# Patient Record
Sex: FEMALE | Race: BLACK OR AFRICAN AMERICAN | Employment: OTHER | URBAN - METROPOLITAN AREA
[De-identification: names, ages, dates, MRNs, and addresses within clinical notes are randomized per-mention and may not be internally consistent; named-entity substitution may affect disease eponyms.]

---

## 2017-03-07 ENCOUNTER — OFFICE VISIT (OUTPATIENT)
Dept: CARDIOLOGY CLINIC | Age: 67
End: 2017-03-07

## 2017-03-07 ENCOUNTER — TELEPHONE (OUTPATIENT)
Dept: CARDIOLOGY | Age: 67
End: 2017-03-07

## 2017-03-07 VITALS
DIASTOLIC BLOOD PRESSURE: 59 MMHG | SYSTOLIC BLOOD PRESSURE: 114 MMHG | HEIGHT: 71 IN | BODY MASS INDEX: 24.36 KG/M2 | WEIGHT: 174 LBS | HEART RATE: 61 BPM

## 2017-03-07 DIAGNOSIS — E78.2 MIXED HYPERLIPIDEMIA: ICD-10-CM

## 2017-03-07 DIAGNOSIS — Z01.810 PREOP CARDIOVASCULAR EXAM: ICD-10-CM

## 2017-03-07 DIAGNOSIS — Z95.810 ICD (IMPLANTABLE CARDIOVERTER-DEFIBRILLATOR) IN PLACE: ICD-10-CM

## 2017-03-07 DIAGNOSIS — I25.10 ATHEROSCLEROSIS OF NATIVE CORONARY ARTERY OF NATIVE HEART WITHOUT ANGINA PECTORIS: Primary | ICD-10-CM

## 2017-03-07 DIAGNOSIS — I10 ESSENTIAL HYPERTENSION, BENIGN: ICD-10-CM

## 2017-03-07 DIAGNOSIS — Z95.1 POSTSURGICAL AORTOCORONARY BYPASS STATUS: ICD-10-CM

## 2017-03-07 NOTE — PROGRESS NOTES
1. Have you been to the ER, urgent care clinic since your last visit? Hospitalized since your last visit? No    2. Have you seen or consulted any other health care providers outside of the [address] [place] [address] since your last visit? Include any pap smears or colon screening. No     3. Since your last visit, have you had any of the following symptoms? .           4. Have you had any blood work, X-rays or cardiac testing? No              5.  Where do you normally have your labs drawn? [facility]    6. Do you need any refills today?    No

## 2017-03-07 NOTE — LETTER
Tatiana Green 1950 
 
3/7/2017 Dear Cristo Dunn, DO I had the pleasure of evaluating  Ms. Varinder Lindsey in office today. Below are the relevant portions of my assessment and plan of care. ICD-10-CM ICD-9-CM 1. Atherosclerosis of native coronary artery of native heart without angina pectoris I25.10 414.01   
 3/17; 8/16 stable Symptoms; CABG 10/02 with LIMA to LAD and SVG to RCA 2. Postsurgical aortocoronary bypass status Z95.1 V45.81   
3. Essential hypertension, benign I10 401.1   
 controlled 4. Mixed hyperlipidemia E78.2 272.2 HEPATIC FUNCTION PANEL  
   LIPID PANEL  
 2/16 LDL81;   
5. ICD (implantable cardioverter-defibrillator) in place-DDD ICD Z95.810 V45.02   
 2/17 nl function; DONNY 
d/w pt & Dr Brenda Paniagua- will schedule gen change electively 6. Preop cardiovascular exam Z01.810 V72.81 CBC W/O DIFF  
   METABOLIC PANEL, BASIC  
   PROTHROMBIN TIME + INR  
   XR CHEST PA LAT  
   URINALYSIS W/ RFLX MICROSCOPIC  
 prior to gen change Current Outpatient Prescriptions Medication Sig Dispense Refill  acetaminophen (TYLENOL) 500 mg tablet Take  by mouth every six (6) hours as needed for Pain.  ezetimibe (ZETIA) 10 mg tablet Take  by mouth.  rosuvastatin (CRESTOR) 40 mg tablet Take 1 Tab by mouth nightly. 30 Tab 4  
 metoprolol (LOPRESSOR) 100 mg tablet Take 100 mg by mouth daily.  amLODIPine (NORVASC) 10 mg tablet Take 10 mg by mouth daily.  aspirin (ASPIRIN) 325 mg tablet Take 325 mg by mouth daily. Orders Placed This Encounter  XR CHEST PA LAT Standing Status:   Future Standing Expiration Date:   9/7/2017 Order Specific Question:   Reason for Exam  
  Answer:   cath, poss PCI Order Specific Question:   Is Patient Allergic to Contrast Dye? Answer:   No  
 CBC W/O DIFF Standing Status:   Future Standing Expiration Date:   3/8/2018  METABOLIC PANEL, BASIC Standing Status:   Future Standing Expiration Date:   3/8/2018  PROTHROMBIN TIME + INR Standing Status:   Future Standing Expiration Date:   4/18/2017  URINALYSIS W/ RFLX MICROSCOPIC Standing Status:   Future Standing Expiration Date:   4/18/2017  
 HEPATIC FUNCTION PANEL Standing Status:   Future Standing Expiration Date:   6/7/2017  LIPID PANEL Standing Status:   Future Standing Expiration Date:   6/7/2017 If you have questions, please do not hesitate to call me. I look forward to following Ms. Leann Mccauley along with you. Sincerely, Elijah Harrison MD

## 2017-03-07 NOTE — MR AVS SNAPSHOT
Visit Information Date & Time Provider Department Dept. Phone Encounter #  
 3/7/2017  1:15 PM Liliya Alas MD [facility] [id number] Follow-up Instructions Return in about 6 weeks (around 4/18/2017), or if symptoms worsen or fail to improve, for post surgery, with pacer/ICD check. Your Appointments 4/6/2017  3:20 PM  
NEW ELECTROPHYSIOLOGY with Leoncio Arevalo MD  
[facility] ([facility]) Appt Note: New EP refer by Tammy Freitas, for END OF HKSR/[phone]/ Ladene Flatten [place] [address] [address] [address] [phone] [address]  
  
    
 4/20/2017  1:45 PM  
ESTABLISHED PATIENT with Liliya Alas MD  
[facility] ([facility]) Appt Note: 6 wks [address] [facility] [address]  
  
   
 [address] [facility]  
  
    
 5/24/2017  8:45 AM  
[facility] ([facility]) Appt Note: [facility] Transmission/[place]/Wireless [address] [facility] [address]  
  
   
 [address] [facility]  
  
    
 8/30/2017  8:30 AM  
[facility] ([facility]) Appt Note: [employer] Transmission/[place]/Wireless [address] [facility] [address]  
[phone]  
  
    
 12/15/2017 11:15 AM  
PROCEDURE with Liliya Alas MD  
[facility] ([facility]) Appt Note: 1 year in office pacer check/[employer] [address] [facility] [address]  
  
   
 [address] [address] [address] [address] Upcoming Health Maintenance Date Due Hepatitis C Screening 1950 DTaP/Tdap/Td series (1 - Tdap) 12/3/1969 BREAST CANCER SCRN MAMMOGRAM 12/3/1998 FOBT Q 1 YEAR AGE 50-75 12/3/1998 ZOSTER VACCINE AGE 60> 12/3/2008 GLAUCOMA SCREENING Q2Y 12/3/2013 OSTEOPOROSIS SCREENING (DEXA) 12/3/2013 Pneumococcal 65+ Low/Medium Risk (1 of 2 - PCV13) 12/3/2013 MEDICARE YEARLY EXAM 12/3/2013 INFLUENZA AGE 9 TO ADULT 8/1/2016 Allergies as of 3/7/2017  Review Complete On: 3/7/2017 By: Lucien Vee MD  
  
 Severity Noted Reaction Type Reactions Losartan High 10/22/2013   Intolerance Angioedema Tongue swelling but not on lips Current Immunizations  Never Reviewed No immunizations on file. Not reviewed this visit You Were Diagnosed With   
  
 Codes Comments Atherosclerosis of native coronary artery of native heart without angina pectoris    -  Primary ICD-10-CM: I25.10 ICD-9-CM: 414.01 3/17; 8/16 stable Symptoms; CABG 10/02 with LIMA to LAD and SVG to RCA Postsurgical aortocoronary bypass status     ICD-10-CM: Z95.1 ICD-9-CM: V45.81 Essential hypertension, benign     ICD-10-CM: I10 
ICD-9-CM: 401.1 controlled Mixed hyperlipidemia     ICD-10-CM: E78.2 ICD-9-CM: 272.2 2/16 LDL81;   
 ICD (implantable cardioverter-defibrillator) in place     ICD-10-CM: Z95.810 ICD-9-CM: V45.02 2/17 nl function; DONNY 
d/w pt & Dr Deidra Chaves- will schedule gen change electively Preop cardiovascular exam     ICD-10-CM: Z01.810 ICD-9-CM: V72.81 prior to gen change Vitals BP Pulse Height(growth percentile) Weight(growth percentile) BMI OB Status 114/59 61 5' 11\" (1.803 m) 174 lb (78.9 kg) 24.27 kg/m2 Menopause Smoking Status Former Smoker Vitals History BMI and BSA Data Body Mass Index Body Surface Area  
 24.27 kg/m 2 1.99 m 2 Preferred Pharmacy Pharmacy Name Phone [address] [facility], [address] [address] Your Updated Medication List  
  
   
This list is accurate as of: 3/7/17  2:11 PM.  Always use your most recent med list.  
 acetaminophen 500 mg tablet Commonly known as:  TYLENOL Take  by mouth every six (6) hours as needed for Pain. aspirin 325 mg tablet Commonly known as:  ASPIRIN Take 325 mg by mouth daily. ezetimibe 10 mg tablet Commonly known as:  Mallie Phuong Take  by mouth.  
  
 metoprolol tartrate 100 mg IR tablet Commonly known as:  LOPRESSOR Take 100 mg by mouth daily. NORVASC 10 mg tablet Generic drug:  amLODIPine Take 10 mg by mouth daily. rosuvastatin 40 mg tablet Commonly known as:  CRESTOR Take 1 Tab by mouth nightly. Follow-up Instructions Return in about 6 weeks (around 4/18/2017), or if symptoms worsen or fail to improve, for post surgery, with pacer/ICD check. To-Do List   
 Around 03/07/2017 Lab:  CBC W/O DIFF   
  
 03/07/2017 Lab:  METABOLIC PANEL, BASIC Around 03/07/2017 Lab:  PROTHROMBIN TIME + INR Around 03/07/2017 Lab:  URINALYSIS W/ RFLX MICROSCOPIC   
  
 03/07/2017 Imaging:  XR CHEST PA LAT Around 03/14/2017 Lab:  HEPATIC FUNCTION PANEL Around 03/14/2017 Lab:  LIPID PANEL Patient Instructions Medications Discontinued During This Encounter Medication Reason  meloxicam (MOBIC) 15 mg tablet Not A Current Medication  chlorthalidone (HYGROTEN) 25 mg tablet Discontinued by Another Clinician Orders Placed This Encounter  XR CHEST PA LAT Standing Status:   Future Standing Expiration Date:   9/7/2017 Order Specific Question:   Reason for Exam  
  Answer:   cath, poss PCI Order Specific Question:   Is Patient Allergic to Contrast Dye? Answer:   No  
 CBC W/O DIFF Standing Status:   Future Standing Expiration Date:   3/8/2018  METABOLIC PANEL, BASIC Standing Status:   Future Standing Expiration Date:   3/8/2018  PROTHROMBIN TIME + INR Standing Status:   Future Standing Expiration Date:   4/18/2017  URINALYSIS W/ RFLX MICROSCOPIC Standing Status:   Future Standing Expiration Date:   4/18/2017  
 HEPATIC FUNCTION PANEL Standing Status:   Future Standing Expiration Date:   6/7/2017  LIPID PANEL Standing Status:   Future Standing Expiration Date:   6/7/2017 High Cholesterol: Care Instructions Your Care Instructions Cholesterol is a type of fat in your blood. It is needed for many body functions, such as making new cells. Cholesterol is made by your body. It also comes from food you eat. High cholesterol means that you have too much of the fat in your blood. This raises your risk of a heart attack and stroke. LDL and HDL are part of your total cholesterol. LDL is the \"bad\" cholesterol. High LDL can raise your risk for heart disease, heart attack, and stroke. HDL is the \"good\" cholesterol. It helps clear bad cholesterol from the body. High HDL is linked with a lower risk of heart disease, heart attack, and stroke. Your cholesterol levels help your doctor find out your risk for having a heart attack or stroke. You and your doctor can talk about whether you need to lower your risk and what treatment is best for you. A heart-healthy lifestyle along with medicines can help lower your cholesterol and your risk. The way you choose to lower your risk will depend on how high your risk is for heart attack and stroke. It will also depend on how you feel about taking medicines. Follow-up care is a key part of your treatment and safety. Be sure to make and go to all appointments, and call your doctor if you are having problems. It's also a good idea to know your test results and keep a list of the medicines you take. How can you care for yourself at home? · Eat a variety of foods every day. Good choices include fruits, vegetables, whole grains (like oatmeal), dried beans and peas, nuts and seeds, soy products (like tofu), and fat-free or low-fat dairy products. · Replace butter, margarine, and hydrogenated or partially hydrogenated oils with olive and canola oils. (Canola oil margarine without trans fat is fine.) · Replace red meat with fish, poultry, and soy protein (like tofu). · Limit processed and packaged foods like chips, crackers, and cookies. · Bake, broil, or steam foods. Don't amanda them. · Be physically active. Get at least 30 minutes of exercise on most days of the week. Walking is a good choice. You also may want to do other activities, such as running, swimming, cycling, or playing tennis or team sports. · Stay at a healthy weight or lose weight by making the changes in eating and physical activity listed above. Losing just a small amount of weight, even 5 to 10 pounds, can reduce your risk for having a heart attack or stroke. · Do not smoke. When should you call for help? Watch closely for changes in your health, and be sure to contact your doctor if: 
· You need help making lifestyle changes. · You have questions about your medicine. Where can you learn more? Go to http://ericBeijing 1000CHI Software Technologyrandee.info/. Enter G352 in the search box to learn more about \"High Cholesterol: Care Instructions. \" Current as of: January 27, 2016 Content Version: 11.1 © 5874-5709 [employer], [employer]. Care instructions adapted under license by [employer] (which disclaims liability or warranty for this information). If you have questions about a medical condition or this instruction, always ask your healthcare professional. [place] [address] any warranty or liability for your use of this information. Introducing [facility]! Graham Taylor introduces [employer] patient portal. Now you can access parts of your medical record, email your doctor's office, and request medication refills online. 1. In your internet browser, go to [email or web address] 2. Click on the First Time User? Click Here link in the Sign In box. You will see the New Member Sign Up page. 3. Enter your EeBria Access Code exactly as it appears below. You will not need to use this code after youve completed the sign-up process. If you do not sign up before the expiration date, you must request a new code. · EeBria Access Code: [email or web address] Expires: 6/5/2017  1:23 PM 
 
4. Enter the last four digits of your Social Security Number (xxxx) and Date of Birth (mm/dd/yyyy) as indicated and click Submit. You will be taken to the next sign-up page. 5. Create a EeBria ID. This will be your EeBria login ID and cannot be changed, so think of one that is secure and easy to remember. 6. Create a EeBria password. You can change your password at any time. 7. Enter your Password Reset Question and Answer. This can be used at a later time if you forget your password. 8. Enter your e-mail address. You will receive e-mail notification when new information is available in [address] 9. Click Sign Up. You can now view and download portions of your medical record. 10. Click the Download Summary menu link to download a portable copy of your medical information. If you have questions, please visit the Frequently Asked Questions section of the EeBria website. Remember, EeBria is NOT to be used for urgent needs. For medical emergencies, dial 911. Now available from your iPhone and Android! Please provide this summary of care documentation to your next provider. Your primary care clinician is listed as Deana Dunn. If you have any questions after today's visit, please call [phone].

## 2017-03-07 NOTE — PATIENT INSTRUCTIONS
Medications Discontinued During This Encounter   Medication Reason    meloxicam (MOBIC) 15 mg tablet Not A Current Medication    chlorthalidone (HYGROTEN) 25 mg tablet Discontinued by Another Clinician       Orders Placed This Encounter    XR CHEST PA LAT     Standing Status:   Future     Standing Expiration Date:   9/7/2017     Order Specific Question:   Reason for Exam     Answer:   cath, poss PCI     Order Specific Question:   Is Patient Allergic to Contrast Dye? Answer:   No    CBC W/O DIFF     Standing Status:   Future     Standing Expiration Date:   4/2/2282    METABOLIC PANEL, BASIC     Standing Status:   Future     Standing Expiration Date:   3/8/2018    PROTHROMBIN TIME + INR     Standing Status:   Future     Standing Expiration Date:   4/18/2017    URINALYSIS W/ RFLX MICROSCOPIC     Standing Status:   Future     Standing Expiration Date:   4/18/2017    HEPATIC FUNCTION PANEL     Standing Status:   Future     Standing Expiration Date:   6/7/2017    LIPID PANEL     Standing Status:   Future     Standing Expiration Date:   6/7/2017          High Cholesterol: Care Instructions  Your Care Instructions  Cholesterol is a type of fat in your blood. It is needed for many body functions, such as making new cells. Cholesterol is made by your body. It also comes from food you eat. High cholesterol means that you have too much of the fat in your blood. This raises your risk of a heart attack and stroke. LDL and HDL are part of your total cholesterol. LDL is the \"bad\" cholesterol. High LDL can raise your risk for heart disease, heart attack, and stroke. HDL is the \"good\" cholesterol. It helps clear bad cholesterol from the body. High HDL is linked with a lower risk of heart disease, heart attack, and stroke. Your cholesterol levels help your doctor find out your risk for having a heart attack or stroke.  You and your doctor can talk about whether you need to lower your risk and what treatment is best for you.  A heart-healthy lifestyle along with medicines can help lower your cholesterol and your risk. The way you choose to lower your risk will depend on how high your risk is for heart attack and stroke. It will also depend on how you feel about taking medicines. Follow-up care is a key part of your treatment and safety. Be sure to make and go to all appointments, and call your doctor if you are having problems. It's also a good idea to know your test results and keep a list of the medicines you take. How can you care for yourself at home? · Eat a variety of foods every day. Good choices include fruits, vegetables, whole grains (like oatmeal), dried beans and peas, nuts and seeds, soy products (like tofu), and fat-free or low-fat dairy products. · Replace butter, margarine, and hydrogenated or partially hydrogenated oils with olive and canola oils. (Canola oil margarine without trans fat is fine.)  · Replace red meat with fish, poultry, and soy protein (like tofu). · Limit processed and packaged foods like chips, crackers, and cookies. · Bake, broil, or steam foods. Don't amanda them. · Be physically active. Get at least 30 minutes of exercise on most days of the week. Walking is a good choice. You also may want to do other activities, such as running, swimming, cycling, or playing tennis or team sports. · Stay at a healthy weight or lose weight by making the changes in eating and physical activity listed above. Losing just a small amount of weight, even 5 to 10 pounds, can reduce your risk for having a heart attack or stroke. · Do not smoke. When should you call for help? Watch closely for changes in your health, and be sure to contact your doctor if:  · You need help making lifestyle changes. · You have questions about your medicine. Where can you learn more? Go to http://eric-randee.info/.   Enter U042 in the search box to learn more about \"High Cholesterol: Care Instructions. \"  Current as of: January 27, 2016  Content Version: 11.1  © 4354-1608 [employer], [facility]. Care instructions adapted under license by [employer] (which disclaims liability or warranty for this information). If you have questions about a medical condition or this instruction, always ask your healthcare professional. [place] [address] any warranty or liability for your use of this information.

## 2017-03-07 NOTE — PROGRESS NOTES
HISTORY OF PRESENT ILLNESS  Rebecca Fallon is a 74 y.o. female. HPI Comments: Patient denies significant chest pain, SOB, palpitations, edema, dizziness      Hypertension   The history is provided by the medical records. This is a chronic problem. Pertinent negatives include no chest pain, no headaches and no shortness of breath. Cholesterol Problem   The history is provided by the medical records. This is a chronic problem. Pertinent negatives include no chest pain, no headaches and no shortness of breath. Review of Systems   Constitutional: Negative for chills, fever, malaise/fatigue and weight loss. HENT: Negative for nosebleeds. Eyes: Negative for discharge. Respiratory: Negative for cough, shortness of breath and wheezing. Cardiovascular: Negative for chest pain, palpitations, orthopnea, claudication, leg swelling and PND. Gastrointestinal: Negative for diarrhea, nausea and vomiting. Genitourinary: Negative for dysuria and hematuria. Musculoskeletal: Negative for joint pain. Skin: Negative for rash. Neurological: Negative for dizziness, seizures, loss of consciousness and headaches. Endo/Heme/Allergies: Negative for polydipsia. Does not bruise/bleed easily. Psychiatric/Behavioral: Negative for depression and substance abuse. The patient does not have insomnia.       Allergies   Allergen Reactions    Losartan Angioedema     Tongue swelling but not on lips       Past Medical History:   Diagnosis Date    Arthritis     Automatic implantable cardiac defibrillator in situ     Coronary atherosclerosis of native coronary artery     CABG 10/02 with LIMA to LAD and SVG to RCA    Depression     Essential hypertension     Headache(784.0)     Other and unspecified hyperlipidemia     9/12 Low density lipoproteins are at goal, triglycerides are at goal, high density lipoproteins are at goal.    Postsurgical aortocoronary bypass status 2002    Presence of permanent cardiac pacemaker-DDD 11/6/2015    OFFICE CHECK : 8/14 nl function, +AHR rare(changed detection to over 150/mt); 11/15 nl function     Unspecified arthropathy, lower leg     S/P Left knee replacement. Had repeat surgery's for her left knee.  UTI (urinary tract infection)        Family History   Problem Relation Age of Onset    Arthritis-osteo Other      not specified    Hypertension Other      not specified    Sudden Death Neg Hx     Heart Attack Neg Hx        Social History   Substance Use Topics    Smoking status: Former Smoker     Quit date: 4/25/2002    Smokeless tobacco: Not on file    Alcohol use No        Current Outpatient Prescriptions   Medication Sig    chlorthalidone (HYGROTEN) 25 mg tablet Take 0.5 Tabs by mouth daily as needed (edema).  acetaminophen (TYLENOL) 500 mg tablet Take  by mouth every six (6) hours as needed for Pain.  meloxicam (MOBIC) 15 mg tablet Take 1 Tab by mouth daily. Take with meals    ezetimibe (ZETIA) 10 mg tablet Take  by mouth.  rosuvastatin (CRESTOR) 40 mg tablet Take 1 Tab by mouth nightly.  metoprolol (LOPRESSOR) 100 mg tablet Take 100 mg by mouth daily.  amLODIPine (NORVASC) 10 mg tablet Take 10 mg by mouth daily.  aspirin (ASPIRIN) 325 mg tablet Take 325 mg by mouth daily. No current facility-administered medications for this visit. Past Surgical History:   Procedure Laterality Date    CARDIAC SURG PROCEDURE UNLIST  11/09    gen change-obici    HX CORONARY ARTERY BYPASS GRAFT  10/2002    HX ORTHOPAEDIC  [id number]    Reduced two component revision using the Sigma System. Lateral release. Suture anchor repair of medial collateral ligament. Ruth Pouch  [id number]    Left knee arthrofibrosis, status post significant scarring, status post left total knee replacement    VASCULAR SURGERY PROCEDURE UNLIST  2005    Aorto biffem.   Dr. Jack Arguello       Diagnostic Studies:  CARDIOLOGY STUDIES 2/13/2013 1/1/2013 1/1/2009 2/1/2008 10/1/2002 Myocardial Perfusion Scan Result - ant ischemia, nl EF - abn scan, fixed anterolateral wall defect with partially reversible defect, EF 70% -   Cardiac Cath Result patent LIMA, sev native CAD including LM, med Rx for now - - - -   Echocardiogram - Complete Result - - EF 65%, PAP 33 - -   Coronary Angiogram Result - - - - Bypass x's 2       There were no vitals taken for this visit. Ms. Varinder Lindsey has a reminder for a \"due or due soon\" health maintenance. I have asked that she contact her primary care provider for follow-up on this health maintenance. Physical Exam   Constitutional: She is oriented to person, place, and time. She appears well-developed and well-nourished. No distress. HENT:   Head: Normocephalic and atraumatic. Eyes: Right eye exhibits no discharge. Left eye exhibits no discharge. No scleral icterus. Neck: Neck supple. No JVD present. Carotid bruit is not present. No thyromegaly present. Cardiovascular: Normal rate, regular rhythm, S1 normal, S2 normal, normal heart sounds and intact distal pulses. Exam reveals no gallop and no friction rub. No murmur heard. Pulmonary/Chest: Effort normal and breath sounds normal. She has no wheezes. She has no rales. Abdominal: Soft. She exhibits no mass. There is no tenderness. Musculoskeletal: She exhibits no edema. Cant put legs straight   Neurological: She is alert and oriented to person, place, and time. Skin: Skin is warm and dry. No rash noted. Psychiatric: She has a normal mood and affect. Her behavior is normal.        ASSESSMENT and PLAN          Jarek Maria was seen today for coronary artery disease, cholesterol problem and hypertension.     Diagnoses and all orders for this visit:    Atherosclerosis of native coronary artery of native heart without angina pectoris  Comments:  3/17; 8/16 stable Symptoms; CABG 10/02 with LIMA to LAD and SVG to RCA      Postsurgical aortocoronary bypass status    Essential hypertension, benign  Comments:  controlled    Mixed hyperlipidemia  Comments:  2/16 LDL81;   Orders:  -     HEPATIC FUNCTION PANEL; Future  -     LIPID PANEL; Future    ICD (implantable cardioverter-defibrillator) in place-DDD ICD  Comments:  2/17 nl function; DONNY  d/w pt & Dr Hoda Green- will schedule gen change electively    Preop cardiovascular exam  Comments:  prior to gen change  Orders:  -     CBC W/O DIFF; Future  -     METABOLIC PANEL, BASIC; Future  -     PROTHROMBIN TIME + INR; Future  -     XR CHEST PA LAT; Future  -     URINALYSIS W/ RFLX MICROSCOPIC; Future        Pertinent laboratory and test data reviewed and discussed with patient. See patient instructions also for other medical advice given    Medications Discontinued During This Encounter   Medication Reason    meloxicam (MOBIC) 15 mg tablet Not A Current Medication    chlorthalidone (HYGROTEN) 25 mg tablet Discontinued by Another Clinician       Follow-up Disposition:  Return in about 6 weeks (around 4/18/2017), or if symptoms worsen or fail to improve, for post surgery, with pacer/ICD check.

## 2017-03-08 ENCOUNTER — TELEPHONE (OUTPATIENT)
Dept: CARDIOLOGY CLINIC | Age: 67
End: 2017-03-08

## 2017-03-08 RX ORDER — SODIUM CHLORIDE 9 MG/ML
25 INJECTION, SOLUTION INTRAVENOUS CONTINUOUS
Status: CANCELLED | OUTPATIENT
Start: 2017-03-08

## 2017-03-08 NOTE — TELEPHONE ENCOUNTER
Patient is scheduled for Dual AICD Gen Change on 3/15/17 @ 8:00am with Dr. Emigdio Restrepo. Lacy Lot or Vannesa Hole could you please call her with her instructions. She is having previously ordered blood work done at [facility] on 3/9/17 . Claudia Mayes Told her you could let her know if what she is doing is what we need. Rocky ANDRADE Dermanis  Christine Reza Dermanis        Cc: Macy Henderson       Caller: Unspecified (Yesterday,  2:00 PM)                     LM on home # to call office to get scheduled for Medtronic Dual AICD Gen Change with Dr. Emigdio Restrepo. Unable to LM on cell #.            Previous Messages       ----- Message -----   Randi Gerber From: Whitney Conde RN      Sent: 3/7/2017   2:16 PM        To: Renetta Vásquez         ----- Message -----      From: Gil Jefferson MD      Sent: 3/7/2017   2:01 PM        To: Whitney Conde RN, Macy Henderson     ----- Message from Gil Jefferson MD sent at 3/7/2017  2:01 PM EST -----   Please schedule Dr. Annalisa Leblanc patient for medtronic aicd generator changeout next week.  thx                         Patient Calls            You   You; Macy Henderson 19 hours ago (2:54 PM)                 LM on home # to call office to get scheduled for Medtronic Dual AICD Gen Change with Dr. Emigdio Restrepo. Unable to LM on cell #. (Routing comment)                        Rachel Lang, LION routed conversation to Elton Cheung 20 hours ago (2:16 PM)                       MD Macy Vitale; Whitney Conde RN 20 hours ago (2:01 PM)                   Please schedule Dr. Annalisa Leblanc patient for medtronic aicd generator changeout next week.  thx   (Routing comment)                        Dr. Leopold Capuchin, MD 20 hours ago (2:00 PM)                       Gil Jefferson MD 20 hours ago (2:00 PM)                 Asked by Dr. Dani Arevalo to arranged AICD generator changeout, triggered last month.      I will make arrangements for changeout next week. Not on [address].  I will have office call patient for date.     Thank you for this referral.                Documentation

## 2017-03-08 NOTE — TELEPHONE ENCOUNTER
Pt aware of instructions and read back the instructions. Pt is having ,lab work at [facility] , no need for cxr for gen change.

## 2017-03-08 NOTE — TELEPHONE ENCOUNTER
[facility]          Patient  EP Instructions                  1. You are scheduled to have a Gen Change on  March 15, 2017 , at 0800 am.    Please check in at 0700 am.    2. Please go to [facility] and park in the outpatient parking lot that is located around to the back of the hospital and enter through the [employer]. Once you enter through the [facility] check in with the  there. The  will either give you directions or assist you in getting to the cath holding area. 3.  [x]       You are not to eat or drink anything after midnight the morning of the               procedure. 4. Please continue to take your medications with a small sip of water on the morning of the procedure with the following exceptions:      5. If you are diabetic, do not take your insulin/sugar pill the morning of the procedure. 6. We encourage families to wait in the waiting room on the first floor while the procedure is being done. The Doctor will come out and talk with you as soon as the procedure is over. 7. There is the possibility that you may spend the night in the hospital, depending on the results of the procedure. This will be determined after the procedure is done. 8.   If you or your family have any questions, please call our office Monday-Friday 9:00am         -4:30 pm , at [phone], and ask to speak to one of the nurses.

## 2017-03-08 NOTE — H&P
Please see full H&P by Dr. Gannon Cons below. Plan Medtronic aicd generator changeout. Procedure discussed with patient and family at bedside. All risks, benefits, alternatives discussed. Plan moderate sedation if anesthesia not available. Risks included but not limited to pain, infection, bleeding, deep venous thrombosis with chronic swelling of arm, anesthesia reactions, pneumothorax, hemothorax, emergent open heart surgery, and death. All questions answered. Diagnosis:  Primary cardiologist Dr. Gannon Cons  -Medtronic dual chamber AICD at [facility], triggered February 2017. Placed by Dr. Murleen Cogan in 2009  -Ischemic cardiomyopathy, improved with EF normal by 2013  -Chronic class II systolic heart failure, compensated  -h/o CAD with CABG in 2002. Cath 2013 with patent LIMA to LAD with severe diffuse disease, medically treated  -h/o transient ischemic cardiomyopathy, improving to normal by echo 2013  -DJD s/p TKA  -HTN  -PAD with remote aortobifem surgery  -Chronic BB therapy  -QRS > 120 msec, unlikely to benefit from biventricular pacing  -Losartan life threatening allergy  -Thrombocytopenia, mild  -Life expectancy > 1 year      HISTORY OF PRESENT ILLNESS  Sophia Brunson is a 74 y.o. female.     HPI Comments: Patient denies significant chest pain, SOB, palpitations, edema, dizziness     Hypertension   The history is provided by the medical records. This is a chronic problem. Pertinent negatives include no chest pain, no headaches and no shortness of breath. Cholesterol Problem   The history is provided by the medical records. This is a chronic problem. Pertinent negatives include no chest pain, no headaches and no shortness of breath.         Review of Systems   Constitutional: Negative for chills, fever, malaise/fatigue and weight loss. HENT: Negative for nosebleeds. Eyes: Negative for discharge. Respiratory: Negative for cough, shortness of breath and wheezing.    Cardiovascular: Negative for chest pain, palpitations, orthopnea, claudication, leg swelling and PND. Gastrointestinal: Negative for diarrhea, nausea and vomiting. Genitourinary: Negative for dysuria and hematuria. Musculoskeletal: Negative for joint pain. Skin: Negative for rash. Neurological: Negative for dizziness, seizures, loss of consciousness and headaches. Endo/Heme/Allergies: Negative for polydipsia. Does not bruise/bleed easily. Psychiatric/Behavioral: Negative for depression and substance abuse. The patient does not have insomnia.            Allergies   Allergen Reactions    Losartan Angioedema       Tongue swelling but not on lips              Past Medical History:   Diagnosis Date    Arthritis      Automatic implantable cardiac defibrillator in situ      Coronary atherosclerosis of native coronary artery       CABG 10/02 with LIMA to LAD and SVG to RCA    Depression      Essential hypertension      Headache(784.0)      Other and unspecified hyperlipidemia       9/12 Low density lipoproteins are at goal, triglycerides are at goal, high density lipoproteins are at goal.    Postsurgical aortocoronary bypass status 2002    Presence of permanent cardiac pacemaker-DDD 11/6/2015     OFFICE CHECK : 8/14 nl function, +AHR rare(changed detection to over 150/mt); 11/15 nl function     Unspecified arthropathy, lower leg       S/P Left knee replacement. Had repeat surgery's for her left knee.     UTI (urinary tract infection)                  Family History   Problem Relation Age of Onset    Arthritis-osteo Other         not specified    Hypertension Other         not specified    Sudden Death Neg Hx      Heart Attack Neg Hx                 Social History   Substance Use Topics    Smoking status: Former Smoker       Quit date: 4/25/2002    Smokeless tobacco: Not on file    Alcohol use No              Current Outpatient Prescriptions   Medication Sig    chlorthalidone (HYGROTEN) 25 mg tablet Take 0.5 Tabs by mouth daily as needed (edema).  acetaminophen (TYLENOL) 500 mg tablet Take by mouth every six (6) hours as needed for Pain.  meloxicam (MOBIC) 15 mg tablet Take 1 Tab by mouth daily. Take with meals    ezetimibe (ZETIA) 10 mg tablet Take by mouth.  rosuvastatin (CRESTOR) 40 mg tablet Take 1 Tab by mouth nightly.  metoprolol (LOPRESSOR) 100 mg tablet Take 100 mg by mouth daily.  amLODIPine (NORVASC) 10 mg tablet Take 10 mg by mouth daily.  aspirin (ASPIRIN) 325 mg tablet Take 325 mg by mouth daily.       No current facility-administered medications for this visit.                Past Surgical History:   Procedure Laterality Date    CARDIAC SURG PROCEDURE UNLIST   11/09     gen change-obici    HX CORONARY ARTERY BYPASS GRAFT   10/2002   [facility] ORTHOPAEDIC   [id number]     Reduced two component revision using the Sigma System. Lateral release. Suture anchor repair of medial collateral ligament. [facility]   [id number]     Left knee arthrofibrosis, status post significant scarring, status post left total knee replacement    VASCULAR SURGERY PROCEDURE UNLIST   2005     Aorto biffem. Dr. Steward Brunner         Diagnostic Studies:  CARDIOLOGY STUDIES 2/13/2013 1/1/2013 1/1/2009 2/1/2008 10/1/2002   Myocardial Perfusion Scan Result - ant ischemia, nl EF - abn scan, fixed anterolateral wall defect with partially reversible defect, EF 70% -   Cardiac Cath Result patent LIMA, sev native CAD including LM, med Rx for now - - - -   Echocardiogram - Complete Result - - EF 65%, PAP 33 - -   Coronary Angiogram Result - - - - Bypass x's 2         There were no vitals taken for this visit.     Ms. Nash Ireland has a reminder for a \"due or due soon\" health maintenance. I have asked that she contact her primary care provider for follow-up on this health maintenance.        Physical Exam   Constitutional: She is oriented to person, place, and time. She appears well-developed and well-nourished. No distress.    HENT:   Head: Normocephalic and atraumatic. Eyes: Right eye exhibits no discharge. Left eye exhibits no discharge. No scleral icterus. Neck: Neck supple. No JVD present. Carotid bruit is not present. No thyromegaly present. Cardiovascular: Normal rate, regular rhythm, S1 normal, S2 normal, normal heart sounds and intact distal pulses. Exam reveals no gallop and no friction rub. No murmur heard. Pulmonary/Chest: Effort normal and breath sounds normal. She has no wheezes. She has no rales. Abdominal: Soft. She exhibits no mass. There is no tenderness. Musculoskeletal: She exhibits no edema. Cant put legs straight   Neurological: She is alert and oriented to person, place, and time. Skin: Skin is warm and dry. No rash noted. Psychiatric: She has a normal mood and affect. Her behavior is normal.         ASSESSMENT and PLAN              Pedrito Check was seen today for coronary artery disease, cholesterol problem and hypertension.     Diagnoses and all orders for this visit:     Atherosclerosis of native coronary artery of native heart without angina pectoris  Comments:  3/17; 8/16 stable Symptoms; CABG 10/02 with LIMA to LAD and SVG to RCA        Postsurgical aortocoronary bypass status     Essential hypertension, benign  Comments:  controlled     Mixed hyperlipidemia  Comments:  2/16 LDL81;   Orders:  - HEPATIC FUNCTION PANEL; Future  - LIPID PANEL; Future     ICD (implantable cardioverter-defibrillator) in place-DDD ICD  Comments:  2/17 nl function; DONNY  d/w pt & Dr Tory Quintana- will schedule gen change electively     Preop cardiovascular exam  Comments:  prior to gen change  Orders:  - CBC W/O DIFF; Future  - METABOLIC PANEL, BASIC; Future  - PROTHROMBIN TIME + INR; Future  - XR CHEST PA LAT; Future  - URINALYSIS W/ RFLX MICROSCOPIC; Future           Pertinent laboratory and test data reviewed and discussed with patient.   See patient instructions also for other medical advice given          Medications Discontinued During This Encounter Medication Reason    meloxicam (MOBIC) 15 mg tablet Not A Current Medication    chlorthalidone (HYGROTEN) 25 mg tablet Discontinued by Another Clinician         Follow-up Disposition:  Return in about 6 weeks (around 4/18/2017), or if symptoms worsen or fail to improve, for post surgery, with pacer/ICD check.         Electronically signed by Baron Jacinto MD at 03/07/17 [id number]

## 2017-03-09 LAB
A-G RATIO,AGRAT: 1.2 RATIO (ref 1.1–2.6)
ALBUMIN SERPL-MCNC: 4 G/DL (ref 3.5–5)
ALP SERPL-CCNC: 81 U/L (ref 40–120)
ALT SERPL-CCNC: 11 U/L (ref 5–40)
ANION GAP SERPL CALC-SCNC: 12.9 MMOL/L
AST SERPL W P-5'-P-CCNC: 23 U/L (ref 10–37)
BACTERIA,BACTU: PRESENT
BILIRUB SERPL-MCNC: 0.3 MG/DL (ref 0.2–1.2)
BILIRUB UR QL: NEGATIVE
BILIRUBIN, DIRECT,CBIL: <0.2 MG/DL (ref 0–0.3)
BUN SERPL-MCNC: 11 MG/DL (ref 6–22)
CALCIUM SERPL-MCNC: 9 MG/DL (ref 8.4–10.5)
CHLORIDE SERPL-SCNC: 102 MMOL/L (ref 98–110)
CHOLEST SERPL-MCNC: 146 MG/DL (ref 110–200)
CO2 SERPL-SCNC: 27 MMOL/L (ref 20–32)
CREAT SERPL-MCNC: 0.8 MG/DL (ref 0.8–1.4)
EPITHELIAL,EPSU: ABNORMAL /HPF (ref 0–2)
ERYTHROCYTE [DISTWIDTH] IN BLOOD BY AUTOMATED COUNT: 14.4 % (ref 10–16)
GFRAA, 66117: >60
GFRNA, 66118: >60
GLOBULIN,GLOB: 3.4 G/DL (ref 2–4)
GLUCOSE SERPL-MCNC: 94 MG/DL (ref 65–99)
GLUCOSE UR QL: NEGATIVE MG/DL
HCT VFR BLD AUTO: 43 % (ref 35.1–48.3)
HDLC SERPL-MCNC: 57 MG/DL (ref 40–59)
HGB BLD-MCNC: 13.5 G/DL (ref 11.7–16.1)
HGB UR QL STRIP: NEGATIVE
INR PPP: 1.03 (ref 0.89–1.29)
KETONES UR QL STRIP.AUTO: NEGATIVE MG/DL
LDLC SERPL CALC-MCNC: 71 MG/DL (ref 50–99)
LEUKOCYTE ESTERASE: NEGATIVE
MCH RBC QN AUTO: 28 PG (ref 26–34)
MCHC RBC AUTO-ENTMCNC: 31 G/DL (ref 32–36)
MCV RBC AUTO: 88 FL (ref 80–95)
NITRITE UR QL STRIP.AUTO: NEGATIVE
PH UR STRIP: 6 PH (ref 5–8)
PLATELET # BLD AUTO: 114 K/UL (ref 140–440)
PMV BLD AUTO: 11.3 FL (ref 6–10.8)
POTASSIUM SERPL-SCNC: 3.4 MMOL/L (ref 3.5–5.5)
PROT SERPL-MCNC: 7.4 G/DL (ref 6.2–8.1)
PROT UR QL STRIP: ABNORMAL MG/DL
PROTHROMBIN TIME: 10.6 SEC (ref 9–13)
RBC # BLD AUTO: 4.88 M/UL (ref 3.8–5.2)
RBC #/AREA URNS HPF: ABNORMAL /HPF
SODIUM SERPL-SCNC: 142 MMOL/L (ref 133–145)
SP GR UR: 1.02 (ref 1–1.03)
TRIGL SERPL-MCNC: 86 MG/DL (ref 40–149)
UROBILINOGEN UR STRIP-MCNC: 2 MG/DL
VLDLC SERPL CALC-MCNC: 17 MG/DL (ref 8–30)
WBC # BLD AUTO: 4.9 K/UL (ref 4–11)
WBC URNS QL MICRO: ABNORMAL /HPF (ref 0–2)

## 2017-03-15 ENCOUNTER — HOSPITAL ENCOUNTER (OUTPATIENT)
Dept: CARDIAC CATH/INVASIVE PROCEDURES | Age: 67
Discharge: HOME | End: 2017-03-15
Attending: INTERNAL MEDICINE | Admitting: INTERNAL MEDICINE
Payer: MEDICARE

## 2017-03-15 ENCOUNTER — ANESTHESIA (OUTPATIENT)
Dept: CARDIAC CATH/INVASIVE PROCEDURES | Age: 67
End: 2017-03-15
Payer: MEDICARE

## 2017-03-15 ENCOUNTER — ANESTHESIA EVENT (OUTPATIENT)
Dept: CARDIAC CATH/INVASIVE PROCEDURES | Age: 67
End: 2017-03-15
Payer: MEDICARE

## 2017-03-15 VITALS
WEIGHT: 174 LBS | HEIGHT: 65 IN | SYSTOLIC BLOOD PRESSURE: 169 MMHG | HEART RATE: 58 BPM | BODY MASS INDEX: 28.99 KG/M2 | RESPIRATION RATE: 23 BRPM | TEMPERATURE: 97.8 F | DIASTOLIC BLOOD PRESSURE: 76 MMHG | OXYGEN SATURATION: 100 %

## 2017-03-15 PROBLEM — Z45.02 AICD AT END OF BATTERY LIFE: Status: ACTIVE | Noted: 2017-03-15

## 2017-03-15 PROCEDURE — 74011000250 HC RX REV CODE- 250

## 2017-03-15 PROCEDURE — 74011250636 HC RX REV CODE- 250/636

## 2017-03-15 PROCEDURE — 77030018673 EP STUDY

## 2017-03-15 PROCEDURE — 76060000032 HC ANESTHESIA 0.5 TO 1 HR

## 2017-03-15 PROCEDURE — 74011250636 HC RX REV CODE- 250/636: Performed by: INTERNAL MEDICINE

## 2017-03-15 PROCEDURE — 74011000250 HC RX REV CODE- 250: Performed by: INTERNAL MEDICINE

## 2017-03-15 RX ORDER — OXYCODONE AND ACETAMINOPHEN 5; 325 MG/1; MG/1
1 TABLET ORAL
Status: DISCONTINUED | OUTPATIENT
Start: 2017-03-15 | End: 2017-03-15 | Stop reason: HOSPADM

## 2017-03-15 RX ORDER — PROPOFOL 10 MG/ML
INJECTION, EMULSION INTRAVENOUS AS NEEDED
Status: DISCONTINUED | OUTPATIENT
Start: 2017-03-15 | End: 2017-03-15 | Stop reason: HOSPADM

## 2017-03-15 RX ORDER — MIDAZOLAM HYDROCHLORIDE 1 MG/ML
INJECTION, SOLUTION INTRAMUSCULAR; INTRAVENOUS AS NEEDED
Status: DISCONTINUED | OUTPATIENT
Start: 2017-03-15 | End: 2017-03-15 | Stop reason: HOSPADM

## 2017-03-15 RX ORDER — OXYCODONE AND ACETAMINOPHEN 5; 325 MG/1; MG/1
1 TABLET ORAL
Qty: 20 TAB | Refills: 0 | Status: SHIPPED | OUTPATIENT
Start: 2017-03-15 | End: 2017-03-24

## 2017-03-15 RX ORDER — FENTANYL CITRATE 50 UG/ML
INJECTION, SOLUTION INTRAMUSCULAR; INTRAVENOUS AS NEEDED
Status: DISCONTINUED | OUTPATIENT
Start: 2017-03-15 | End: 2017-03-15 | Stop reason: HOSPADM

## 2017-03-15 RX ORDER — CEFAZOLIN SODIUM 2 G/50ML
2 SOLUTION INTRAVENOUS
Status: COMPLETED | OUTPATIENT
Start: 2017-03-15 | End: 2017-03-15

## 2017-03-15 RX ORDER — LIDOCAINE HYDROCHLORIDE 10 MG/ML
1-90 INJECTION, SOLUTION EPIDURAL; INFILTRATION; INTRACAUDAL; PERINEURAL
Status: DISCONTINUED | OUTPATIENT
Start: 2017-03-15 | End: 2017-03-15 | Stop reason: HOSPADM

## 2017-03-15 RX ORDER — LIDOCAINE HYDROCHLORIDE 20 MG/ML
INJECTION, SOLUTION EPIDURAL; INFILTRATION; INTRACAUDAL; PERINEURAL AS NEEDED
Status: DISCONTINUED | OUTPATIENT
Start: 2017-03-15 | End: 2017-03-15 | Stop reason: HOSPADM

## 2017-03-15 RX ORDER — SODIUM CHLORIDE, SODIUM LACTATE, POTASSIUM CHLORIDE, CALCIUM CHLORIDE 600; 310; 30; 20 MG/100ML; MG/100ML; MG/100ML; MG/100ML
INJECTION, SOLUTION INTRAVENOUS
Status: DISCONTINUED | OUTPATIENT
Start: 2017-03-15 | End: 2017-03-15 | Stop reason: HOSPADM

## 2017-03-15 RX ORDER — MIDAZOLAM HYDROCHLORIDE 1 MG/ML
.5-2 INJECTION, SOLUTION INTRAMUSCULAR; INTRAVENOUS
Status: DISCONTINUED | OUTPATIENT
Start: 2017-03-15 | End: 2017-03-15 | Stop reason: HOSPADM

## 2017-03-15 RX ORDER — FENTANYL CITRATE 50 UG/ML
12.5-1 INJECTION, SOLUTION INTRAMUSCULAR; INTRAVENOUS
Status: DISCONTINUED | OUTPATIENT
Start: 2017-03-15 | End: 2017-03-15 | Stop reason: HOSPADM

## 2017-03-15 RX ORDER — SODIUM CHLORIDE 9 MG/ML
25 INJECTION, SOLUTION INTRAVENOUS CONTINUOUS
Status: DISCONTINUED | OUTPATIENT
Start: 2017-03-15 | End: 2017-03-15 | Stop reason: HOSPADM

## 2017-03-15 RX ORDER — CEFAZOLIN SODIUM 1 G/3ML
1 INJECTION, POWDER, FOR SOLUTION INTRAMUSCULAR; INTRAVENOUS ONCE
Status: COMPLETED | OUTPATIENT
Start: 2017-03-15 | End: 2017-03-15

## 2017-03-15 RX ADMIN — SODIUM CHLORIDE, SODIUM LACTATE, POTASSIUM CHLORIDE, CALCIUM CHLORIDE: 600; 310; 30; 20 INJECTION, SOLUTION INTRAVENOUS at 08:10

## 2017-03-15 RX ADMIN — PROPOFOL 20 MG: 10 INJECTION, EMULSION INTRAVENOUS at 08:30

## 2017-03-15 RX ADMIN — LIDOCAINE HYDROCHLORIDE 40 ML: 10 INJECTION, SOLUTION EPIDURAL; INFILTRATION; INTRACAUDAL; PERINEURAL at 08:28

## 2017-03-15 RX ADMIN — FENTANYL CITRATE 50 MCG: 50 INJECTION, SOLUTION INTRAMUSCULAR; INTRAVENOUS at 08:16

## 2017-03-15 RX ADMIN — FENTANYL CITRATE 50 MCG: 50 INJECTION, SOLUTION INTRAMUSCULAR; INTRAVENOUS at 08:37

## 2017-03-15 RX ADMIN — MIDAZOLAM HYDROCHLORIDE 2 MG: 1 INJECTION, SOLUTION INTRAMUSCULAR; INTRAVENOUS at 08:09

## 2017-03-15 RX ADMIN — CEFAZOLIN 1 G: 330 INJECTION, POWDER, FOR SOLUTION INTRAMUSCULAR; INTRAVENOUS at 08:38

## 2017-03-15 RX ADMIN — LIDOCAINE HYDROCHLORIDE 20 MG: 20 INJECTION, SOLUTION EPIDURAL; INFILTRATION; INTRACAUDAL; PERINEURAL at 08:18

## 2017-03-15 RX ADMIN — PROPOFOL 40 MG: 10 INJECTION, EMULSION INTRAVENOUS at 08:25

## 2017-03-15 RX ADMIN — CEFAZOLIN SODIUM 2 G: 2 SOLUTION INTRAVENOUS at 08:10

## 2017-03-15 RX ADMIN — PROPOFOL 40 MG: 10 INJECTION, EMULSION INTRAVENOUS at 08:19

## 2017-03-15 RX ADMIN — PROPOFOL 20 MG: 10 INJECTION, EMULSION INTRAVENOUS at 08:33

## 2017-03-15 NOTE — ROUTINE PROCESS
TRANSFER - OUT REPORT:    Verbal report given to LILLY Barnes RN(name) on Cynthia Domínguez  being transferred to [facility]) for routine post - op       Report consisted of patients Situation, Background, Assessment and   Recommendations(SBAR). Information from the following report(s) SBAR, Kardex, Procedure Summary and MAR was reviewed with the receiving nurse. Lines:   Peripheral IV 03/15/17 Left Antecubital (Active)   Site Assessment Clean, dry, & intact 3/15/2017  7:38 AM   Phlebitis Assessment 0 3/15/2017  7:38 AM   Infiltration Assessment 0 3/15/2017  7:38 AM   Dressing Status Clean, dry, & intact 3/15/2017  7:38 AM   Dressing Type Transparent 3/15/2017  7:38 AM   Hub Color/Line Status Pink 3/15/2017  7:38 AM       Peripheral IV 03/15/17 Left Antecubital (Active)        Opportunity for questions and clarification was provided.       Patient transported with:   Registered Nurse

## 2017-03-15 NOTE — DISCHARGE INSTRUCTIONS
Disposition:  Will need follow-up with device/wound check in 1 weeks with primary cardiologist, Dr. Maynor Reyes. Restrictions: For affected arm:  No lifting greater than 10 lbs or lifting elbow above shoulder for 4 weeks. Keep incision clean and dry for a total of 72 hours after procedure. No hot tubs or pools for 2 weeks. OK to shower with \"pad\" dry incision after 72 hours. No driving ideally for 1 week due to concern for airbag. DISCHARGE SUMMARY from Nurse    The following personal items are in your possession at time of discharge:       Visual Aid: At bedside, Glasses                            PATIENT INSTRUCTIONS:    After general anesthesia or intravenous sedation, for 24 hours or while taking prescription Narcotics:  · Limit your activities  · Do not drive and operate hazardous machinery  · Do not make important personal or business decisions  · Do  not drink alcoholic beverages  · If you have not urinated within 8 hours after discharge, please contact your surgeon on call. Report the following to your surgeon:  · Excessive pain, swelling, redness or odor of or around the surgical area  · Temperature over 100.5  · Nausea and vomiting lasting longer than 4 hours or if unable to take medications  · Any signs of decreased circulation or nerve impairment to extremity: change in color, persistent  numbness, tingling, coldness or increase pain  · Any questions        What to do at Home    If you experience any of the following symptoms , please follow up with   . *  Please give a list of your current medications to your Primary Care Provider. *  Please update this list whenever your medications are discontinued, doses are      changed, or new medications (including over-the-counter products) are added. *  Please carry medication information at all times in case of emergency situations.           These are general instructions for a healthy lifestyle:    No smoking/ No tobacco products/ Avoid exposure to second hand smoke    Surgeon General's Warning:  Quitting smoking now greatly reduces serious risk to your health. Obesity, smoking, and sedentary lifestyle greatly increases your risk for illness    A healthy diet, regular physical exercise & weight monitoring are important for maintaining a healthy lifestyle    You may be retaining fluid if you have a history of heart failure or if you experience any of the following symptoms:  Weight gain of 3 pounds or more overnight or 5 pounds in a week, increased swelling in our hands or feet or shortness of breath while lying flat in bed. Please call your doctor as soon as you notice any of these symptoms; do not wait until your next office visit. Recognize signs and symptoms of STROKE:    F-face looks uneven    A-arms unable to move or move unevenly    S-speech slurred or non-existent    T-time-call 911 as soon as signs and symptoms begin-DO NOT go       Back to bed or wait to see if you get better-TIME IS BRAIN. Warning Signs of HEART ATTACK     Call 911 if you have these symptoms:   Chest discomfort. Most heart attacks involve discomfort in the center of the chest that lasts more than a few minutes, or that goes away and comes back. It can feel like uncomfortable pressure, squeezing, fullness, or pain.  Discomfort in other areas of the upper body. Symptoms can include pain or discomfort in one or both arms, the back, neck, jaw, or stomach.  Shortness of breath with or without chest discomfort.  Other signs may include breaking out in a cold sweat, nausea, or lightheadedness. Don't wait more than five minutes to call 911 - MINUTES MATTER! Fast action can save your life. Calling 911 is almost always the fastest way to get lifesaving treatment. Emergency Medical Services staff can begin treatment when they arrive -- up to an hour sooner than if someone gets to the hospital by car.        The discharge information has been reviewed with the patient and caregiver. The patient and caregiver verbalized understanding. Discharge medications reviewed with the patient and caregiver and appropriate educational materials and side effects teaching were provided.     Patient armband removed and shredded

## 2017-03-15 NOTE — ANESTHESIA PREPROCEDURE EVALUATION
Anesthetic History   No history of anesthetic complications            Review of Systems / Medical History  Patient summary reviewed, nursing notes reviewed and pertinent labs reviewed    Pulmonary  Within defined limits                 Neuro/Psych         Psychiatric history     Cardiovascular    Hypertension: well controlled          CAD         GI/Hepatic/Renal                Endo/Other        Arthritis     Other Findings   Comments: Current Smoker? NO       Elective Surgery? Yes       Abstained from smoking 24 hours prior to anesthesia? N/A    Risk Factors for Postoperative nausea/vomiting:       History of postoperative nausea/vomiting? NO       Female? YES       Motion sickness? NO       Intended opioid administration for postoperative analgesia?   YES           Physical Exam    Airway  Mallampati: II  TM Distance: 4 - 6 cm  Neck ROM: normal range of motion   Mouth opening: Normal     Cardiovascular  Regular rate and rhythm,  S1 and S2 normal,  no murmur, click, rub, or gallop             Dental    Dentition: Poor dentition     Pulmonary  Breath sounds clear to auscultation               Abdominal  GI exam deferred       Other Findings            Anesthetic Plan    ASA: 3  Anesthesia type: MAC          Induction: Intravenous  Anesthetic plan and risks discussed with: Patient

## 2017-03-15 NOTE — PROGRESS NOTES
Cath holding summary    2347 Patient escorted to cath holding from waiting area ambulatory with walker, alert and oriented x 4, voicing no complaints. Changed into gown and placed on monitor. .  NPO since [place]. Lab results, med rec and H&P reviewed on chart. PIV x 1 inserted without difficulty. Family to bedside. 0900 patient returned to cath holding from cath lab alert, however drowsy /82 will watch, no C/O pain at this time, dressing to left upper chest, clean dry and intact. Family at bedside will continue to monitor.

## 2017-03-15 NOTE — ANESTHESIA POSTPROCEDURE EVALUATION
Post-Anesthesia Evaluation and Assessment    Patient: Namrata Salmon MRN: [id number]  SSN: [id number]    YOB: 1950  Age: 74 y.o. Sex: female       Cardiovascular Function/Vital Signs  Visit Vitals    /69 (BP 1 Location: Right arm)    Pulse (!) 58    Temp 36.6 °C (97.8 °F)    Resp 12    Ht 5' 5\" (1.651 m)    Wt 78.9 kg (174 lb)    SpO2 98%    Breastfeeding No    BMI 28.96 kg/m2       Patient is status post MAC anesthesia for * No procedures listed *. Nausea/Vomiting: None    Postoperative hydration reviewed and adequate. Pain:  Pain Scale 1: Numeric (0 - 10) (03/15/17 0903)  Pain Intensity 1: 0 (03/15/17 0903)   Managed    Neurological Status: At baseline    Mental Status and Level of Consciousness: Arousable    Pulmonary Status:   O2 Device: Room air (03/15/17 0915)   Adequate oxygenation and airway patent    Complications related to anesthesia: None    Post-anesthesia assessment completed.  No concerns    Signed By: Mike Corado MD     March 15, 2017

## 2017-03-15 NOTE — PROCEDURES
PROCEDURES   - Generator changeout of Medtronic dual chamber automatic implantable cardioverter defibrillator, placed for primary prevention. Not dependent.  - MAC sedation by anesthesia. Diagnosis: Primary cardiologist Dr. Alexia Gomez  -Medtronic dual chamber AICD at [facility], triggered February 2017. Placed initially in 2002, changeout by Dr. Poppy Figueroa in 2009  -Ischemic cardiomyopathy, improved with EF normal by 2013  -Chronic class II systolic heart failure, compensated  -h/o CAD with CABG in 2002. Cath 2013 with patent LIMA to LAD with severe diffuse disease, medically treated  -h/o transient ischemic cardiomyopathy, improving to normal by echo 2013  -DJD s/p TKA  -HTN  -PAD with remote aortobifem surgery  -Chronic BB therapy  -QRS > 120 msec, unlikely to benefit from biventricular pacing  -Losartan life threatening allergy  -Thrombocytopenia, mild  -Life expectancy > 1 year    PROCEDURE: After informed consent was obtained, the patient was brought to the electrophysiology lab in a fasting, nonsedated state. The left chest was prepped and draped in the usual sterile fashion. Local lidocaine was infiltrated just below the left clavicle. A 4-cm transverse incision was created in the left chest above the old device. Using electrocautery and blunt dissection, the device was exposed and removed from the pocket. The leads were detached. Hemostasis was confirmed. The pocket was washed with antibiotic solution and the generator was attached to the leads placed in the pocket and sutured in place. The pocket was closed with 2-0 Vicryl in 2 deep layers. Skin was closed with Dermabond. No DFT testing was performed, as previously discussed. DEVICE DETAILS:  See separate scanned report. IMPRESSION:   -Successful generator changeout of dual chamber Medtronic AICD. -Plan to discharge home later today.  -Wound check with primary cardiologist, Dr. Alexia Gomez, in 1 week.

## 2017-03-15 NOTE — PROGRESS NOTES
Patient discharged home with family, vital signs stable, dressing to left chest clean dry and intact, no C/o of pain.

## 2017-03-15 NOTE — IP AVS SNAPSHOT
Leesa Grate 
 
 
 [address] Patient: Tatiana Green MRN: [id number] You are allergic to the following Allergen Reactions Losartan Angioedema Tongue swelling but not on lips Recent Documentation Height Weight Breastfeeding? BMI OB Status Smoking Status 1.651 m 78.9 kg No 28.96 kg/m2 Menopause Former Smoker Emergency Contacts Name Discharge Info Relation Home Work Mobile [facility] DISCHARGE CAREGIVER [3] Sister [23] [phone] About your hospitalization You were admitted on:  March 15, 2017 You last received care in the:  [facility] CATH HOLDING You were discharged on:  March 15, 2017 Unit phone number:  [phone] Why you were hospitalized Your primary diagnosis was:  Aicd At End Of Battery Life Providers Seen During Your Hospitalizations Provider Role Specialty Primary office phone Skye Matthews MD Attending Provider Cardiology [phone] Your Primary Care Physician (PCP) Primary Care Physician Office Phone Office Fax [address] [place] [address] [phone] Follow-up Information Follow up With Details Comments Contact Info Catherine Dunn, [address] [address] [phone] Marcelino Loo MD In 1 week  [address] [place] [address] [facility] [address] 
[phone] Your Appointments Thursday April 06, 2017  3:20 PM EDT  
NEW ELECTROPHYSIOLOGY with Skye Matthews MD  
[facility] ([address]) [facility] [address] [address] [address] [phone] Friday April 07, 2017 10:45 AM EDT PROCEDURE with Marcelino Loo MD  
[facility] ([address]) [place] [address] [facility]  
[phone] Current Discharge Medication List  
  
START taking these medications Dose & Instructions Dispensing Information Comments Morning Noon Evening Bedtime  
 oxyCODONE-acetaminophen 5-325 mg per tablet Commonly known as:  PERCOCET Your last dose was: Your next dose is:    
   
   
 Dose:  1 Tab Take 1 Tab by mouth every six (6) hours as needed for Pain. Max Daily Amount: 4 Tabs. Quantity:  20 Tab Refills:  0 CONTINUE these medications which have NOT CHANGED Dose & Instructions Dispensing Information Comments Morning Noon Evening Bedtime  
 acetaminophen 500 mg tablet Commonly known as:  TYLENOL Your last dose was: Your next dose is: Take  by mouth every six (6) hours as needed for Pain. Refills:  0  
     
   
   
   
  
 aspirin 325 mg tablet Commonly known as:  ASPIRIN Your last dose was: Your next dose is:    
   
   
 Dose:  325 mg Take 325 mg by mouth daily. Refills:  0  
     
   
   
   
  
 ezetimibe 10 mg tablet Commonly known as:  Jenyasmeen Sieyordanas Your last dose was: Your next dose is: Take  by mouth. Refills:  0  
     
   
   
   
  
 metoprolol tartrate 100 mg IR tablet Commonly known as:  LOPRESSOR Your last dose was: Your next dose is:    
   
   
 Dose:  100 mg Take 100 mg by mouth daily. Refills:  0 NORVASC 10 mg tablet Generic drug:  amLODIPine Your last dose was: Your next dose is:    
   
   
 Dose:  10 mg Take 10 mg by mouth daily. Refills:  0  
     
   
   
   
  
 rosuvastatin 40 mg tablet Commonly known as:  CRESTOR Your last dose was: Your next dose is:    
   
   
 Dose:  40 mg Take 1 Tab by mouth nightly. Quantity:  30 Tab Refills:  4 Where to Get Your Medications Information on where to get these meds will be given to you by the nurse or doctor. ! Ask your nurse or doctor about these medications  
  oxyCODONE-acetaminophen 5-325 mg per tablet Discharge Instructions Disposition: 
Will need follow-up with device/wound check in 1 weeks with primary cardiologist, Dr. Pancho Mondragon. Restrictions: For affected arm:  No lifting greater than 10 lbs or lifting elbow above shoulder for 4 weeks. Keep incision clean and dry for a total of 72 hours after procedure. No hot tubs or pools for 2 weeks. OK to shower with \"pad\" dry incision after 72 hours. No driving ideally for 1 week due to concern for airbag. DISCHARGE SUMMARY from Nurse The following personal items are in your possession at time of discharge: 
 
  
Visual Aid: At bedside, Glasses PATIENT INSTRUCTIONS: 
 
After general anesthesia or intravenous sedation, for 24 hours or while taking prescription Narcotics: · Limit your activities · Do not drive and operate hazardous machinery · Do not make important personal or business decisions · Do  not drink alcoholic beverages · If you have not urinated within 8 hours after discharge, please contact your surgeon on call. Report the following to your surgeon: 
· Excessive pain, swelling, redness or odor of or around the surgical area · Temperature over 100.5 · Nausea and vomiting lasting longer than 4 hours or if unable to take medications · Any signs of decreased circulation or nerve impairment to extremity: change in color, persistent  numbness, tingling, coldness or increase pain · Any questions What to do at [facility] If you experience any of the following symptoms , please follow up with Mabeline Sink *  Please give a list of your current medications to your Primary Care Provider.  
 
*  Please update this list whenever your medications are discontinued, doses are 
 changed, or new medications (including over-the-counter products) are added. *  Please carry medication information at all times in case of emergency situations. These are general instructions for a healthy lifestyle: No smoking/ No tobacco products/ Avoid exposure to second hand smoke Surgeon General's Warning:  Quitting smoking now greatly reduces serious risk to your health. Obesity, smoking, and sedentary lifestyle greatly increases your risk for illness A healthy diet, regular physical exercise & weight monitoring are important for maintaining a healthy lifestyle You may be retaining fluid if you have a history of heart failure or if you experience any of the following symptoms:  Weight gain of 3 pounds or more overnight or 5 pounds in a week, increased swelling in our hands or feet or shortness of breath while lying flat in bed. Please call your doctor as soon as you notice any of these symptoms; do not wait until your next office visit. Recognize signs and symptoms of STROKE: 
 
F-face looks uneven A-arms unable to move or move unevenly S-speech slurred or non-existent T-time-call 911 as soon as signs and symptoms begin-DO NOT go Back to bed or wait to see if you get better-TIME IS BRAIN. Warning Signs of HEART ATTACK Call 911 if you have these symptoms: 
? Chest discomfort. Most heart attacks involve discomfort in the center of the chest that lasts more than a few minutes, or that goes away and comes back. It can feel like uncomfortable pressure, squeezing, fullness, or pain. ? Discomfort in other areas of the upper body. Symptoms can include pain or discomfort in one or both arms, the back, neck, jaw, or stomach. ? Shortness of breath with or without chest discomfort. ? Other signs may include breaking out in a cold sweat, nausea, or lightheadedness. Don't wait more than five minutes to call [address]!  Fast action can save your life. Calling 911 is almost always the fastest way to get lifesaving treatment. Emergency Medical Services staff can begin treatment when they arrive  up to an hour sooner than if someone gets to the hospital by car. The discharge information has been reviewed with the patient and caregiver. The patient and caregiver verbalized understanding. Discharge medications reviewed with the patient and caregiver and appropriate educational materials and side effects teaching were provided. Patient armband removed and shredded Discharge Orders None Introducing [facility]! Jacquelyn Dubon introduces [employer] patient portal. Now you can access parts of your medical record, email your doctor's office, and request medication refills online. 1. In your internet browser, go to [email or web address] 2. Click on the First Time User? Click Here link in the Sign In box. You will see the New Member Sign Up page. 3. Enter your [employer] Access Code exactly as it appears below. You will not need to use this code after youve completed the sign-up process. If you do not sign up before the expiration date, you must request a new code. · [employer] Access Code: [email or web address] Expires: 6/5/2017  2:23 PM 
 
4. Enter the last four digits of your Social Security Number (xxxx) and Date of Birth (mm/dd/yyyy) as indicated and click Submit. You will be taken to the next sign-up page. 5. Create a [employer] ID. This will be your [employer] login ID and cannot be changed, so think of one that is secure and easy to remember. 6. Create a [employer] password. You can change your password at any time. 7. Enter your Password Reset Question and Answer. This can be used at a later time if you forget your password. 8. Enter your e-mail address. You will receive e-mail notification when new information is available in [address] 9. Click Sign Up. You can now view and download portions of your medical record. 10. Click the Download Summary menu link to download a portable copy of your medical information. If you have questions, please visit the Frequently Asked Questions section of the [employer] website. Remember, [employer] is NOT to be used for urgent needs. For medical emergencies, dial 911. Now available from your iPhone and Android! General Information Please provide this summary of care documentation to your next provider. Patient Signature:  ____________________________________________________________ Date:  ____________________________________________________________  
  
Lan Artist Provider Signature:  ____________________________________________________________ Date:  ____________________________________________________________

## 2017-03-24 ENCOUNTER — OFFICE VISIT (OUTPATIENT)
Dept: CARDIOLOGY CLINIC | Age: 67
End: 2017-03-24

## 2017-03-24 VITALS
DIASTOLIC BLOOD PRESSURE: 52 MMHG | HEART RATE: 48 BPM | HEIGHT: 65 IN | BODY MASS INDEX: 28.82 KG/M2 | SYSTOLIC BLOOD PRESSURE: 123 MMHG | WEIGHT: 173 LBS

## 2017-03-24 DIAGNOSIS — Z95.810 ICD (IMPLANTABLE CARDIOVERTER-DEFIBRILLATOR) IN PLACE: ICD-10-CM

## 2017-03-24 DIAGNOSIS — I25.10 ATHEROSCLEROSIS OF NATIVE CORONARY ARTERY OF NATIVE HEART WITHOUT ANGINA PECTORIS: Primary | ICD-10-CM

## 2017-03-24 DIAGNOSIS — E78.2 MIXED HYPERLIPIDEMIA: ICD-10-CM

## 2017-03-24 DIAGNOSIS — Z95.1 POSTSURGICAL AORTOCORONARY BYPASS STATUS: ICD-10-CM

## 2017-03-24 DIAGNOSIS — I10 ESSENTIAL HYPERTENSION, BENIGN: ICD-10-CM

## 2017-03-24 RX ORDER — METOPROLOL TARTRATE 100 MG/1
100 TABLET ORAL 2 TIMES DAILY
Qty: 60 TAB | Refills: 3 | Status: SHIPPED | OUTPATIENT
Start: 2017-03-24 | End: 2017-07-20 | Stop reason: SDUPTHER

## 2017-03-24 NOTE — PATIENT INSTRUCTIONS
Medications Discontinued During This Encounter   Medication Reason    oxyCODONE-acetaminophen (PERCOCET) 5-325 mg per tablet Not A Current Medication    metoprolol (LOPRESSOR) 100 mg tablet Reorder       Orders Placed This Encounter    LIPID PANEL     Standing Status:   Future     Standing Expiration Date:   6/24/2017    HEPATIC FUNCTION PANEL     Standing Status:   Future     Standing Expiration Date:   6/24/2017    GRAM EVAL (IN PERSON) IMPLANT DEVICE,CARDVERT/DEFIB,MULT LEAD     Order Specific Question:   Reason for Exam:     Answer:   icd f/u    metoprolol tartrate (LOPRESSOR) 100 mg IR tablet     Sig: Take 1 Tab by mouth two (2) times a day. Indications: hypertension     Dispense:  60 Tab     Refill:  3     please get remote checks for pacer or ICD every 3 months and Office check in 1 yr  Please call our office after every transmission to confirm success of transmission       Mediterranean Diet: Care Instructions  Your Care Instructions  The [facility] diet features foods eaten in [place], [place], [place] and [place], and other countries that border the . It emphasizes eating a diet rich in fruits, vegetables, nuts, and high-fiber grains, and limits meat, cheese, and sweets. The Mediterranean diet may:  · Prevent heart disease and lower the risk of a heart attack or stroke. · Prevent type 2 diabetes. · Prevent Alzheimer's disease and other dementia. · Prevent depression. · Prevent Parkinson's disease. This diet contains more fat than other heart-healthy diets. But the fats are mainly from nuts, unsaturated oils, such as fish oils, olive oil, and certain nut or seed oils (such as canola, soybean, or flaxseed oil). These types of oils may help protect the heart and blood vessels. Follow-up care is a key part of your treatment and safety. Be sure to make and go to all appointments, and call your doctor if you are having problems.  It's also a good idea to know your test results and keep a list of the medicines you take. How can you care for yourself at home? What to eat  · Eat a variety of fruits and vegetables each day, such as grapes, blueberries, tomatoes, broccoli, peppers, figs, olives, spinach, eggplant, beans, lentils, and chickpeas. · Eat a variety of whole-grain foods each day, such as oats, brown rice, and whole wheat bread, pasta, and couscous. · Eat fish at least 2 times a week. Try tuna, salmon, mackerel, lake trout, herring, or sardines. · Eat moderate amounts of low-fat dairy products each day or weekly, such as milk, cheese, or yogurt. · Eat moderate amounts of poultry and eggs every 2 days or weekly. · Choose healthy (unsaturated) fats, such as nuts, olive oil and certain nut or seed oils like canola, soybean, and flaxseed. · Limit unhealthy (saturated) fats, such as butter, palm oil, and coconut oil. And limit fats found in animal products, such as meat and dairy products made with whole milk. Try to eat red meat only a few times a month in very small amounts. · Limit sweets and desserts to only a few times a week. This includes sugar-sweetened drinks like soda. The Mediterranean diet may also include red wine with your meal1 glass each day for women and up to 2 glasses a day for men. Tips for changing your diet  · Dip bread in a mix of olive oil and fresh herbs instead of using butter. · Add avocado slices to your sandwich instead of juarez. · Have fish for lunch or dinner instead of red meat. Brush the fish with olive oil, and broil or grill it. · Sprinkle your salad with seeds or nuts instead of cheese. · Cook with olive or canola oil instead of butter or oils that are high in saturated fat. · Switch from 2% milk or whole milk to 1% or fat-free milk. · Dip raw vegetables in a vinaigrette dressing or hummus instead of dips made from mayonnaise or sour cream.  · Have a piece of fruit for dessert instead of a piece of cake.  Try baked apples, or have some dried fruit. Part of the Mediterranean diet is being active. Get at least 30 minutes of exercise on most days of the week. Walking is a good choice. You also may want to do other activities, such as running, swimming, cycling, or playing tennis or team sports. Where can you learn more? Go to http://eric-randee.info/. Enter O407 in the search box to learn more about \"Mediterranean Diet: Care Instructions. \"  Current as of: July 26, 2016  Content Version: 11.1  © 0360-2920 [employer]. Care instructions adapted under license by "[employer] (which disclaims liability or warranty for this information). If you have questions about a medical condition or this instruction, always ask your healthcare professional. [address] any warranty or liability for your use of this information.

## 2017-03-24 NOTE — MR AVS SNAPSHOT
Visit Information Date & Time Provider Department Dept. Phone Encounter #  
 3/24/2017  9:30 AM Renate Galadmez MD [facility] [phone] [id number] Follow-up Instructions Return in about 6 months (around 9/24/2017), or if symptoms worsen or fail to improve, for post test.  
  
Your Appointments 4/6/2017  3:20 PM  
NEW ELECTROPHYSIOLOGY with Anderson Tuttle MD  
[facility] ([facility]) Appt Note: New EP refer by Yoselin Roche, for END OF ZEGG/[phone]/ Delcie Shorts [place] [address] [address] [address] [phone] [address] [address]  
  
    
 10/2/2017 10:00 AM  
ESTABLISHED PATIENT with Renate Galdamez MD  
[facility] ([facility]) Appt Note: 6 month post lipid/hepatic [address] [place] [address] [address]  
  
   
 [address] [address] [address] [address] Upcoming Health Maintenance Date Due Hepatitis C Screening 1950 DTaP/Tdap/Td series (1 - Tdap) 12/3/1969 BREAST CANCER SCRN MAMMOGRAM 12/3/1998 FOBT Q 1 YEAR AGE 50-75 12/3/1998 ZOSTER VACCINE AGE 60> 12/3/2008 GLAUCOMA SCREENING Q2Y 12/3/2013 OSTEOPOROSIS SCREENING (DEXA) 12/3/2013 Pneumococcal 65+ Low/Medium Risk (1 of 2 - PCV13) 12/3/2013 MEDICARE YEARLY EXAM 12/3/2013 INFLUENZA AGE 9 TO ADULT 8/1/2016 Allergies as of 3/24/2017  Review Complete On: 3/24/2017 By: Renate Galdamez MD  
  
 Severity Noted Reaction Type Reactions Losartan High 10/22/2013   Intolerance Angioedema Tongue swelling but not on lips Current Immunizations  Never Reviewed No immunizations on file. Not reviewed this visit You Were Diagnosed With   
  
 Codes Comments Atherosclerosis of native coronary artery of native heart without angina pectoris    -  Primary ICD-10-CM: I25.10 ICD-9-CM: 414.01 3/17; 8/16 stable Symptoms; CABG 10/02 with LIMA to LAD and SVG to RCA Postsurgical aortocoronary bypass status     ICD-10-CM: Z95.1 ICD-9-CM: V45.81 Essential hypertension, benign     ICD-10-CM: I10 
ICD-9-CM: 401.1 ICD (implantable cardioverter-defibrillator) in place     ICD-10-CM: Z95.810 ICD-9-CM: V45.02 3/17 nl function, nl vol Mixed hyperlipidemia     ICD-10-CM: E78.2 ICD-9-CM: 272.2 2/16 LDL81; 10/15, Vitals BP Pulse Height(growth percentile) Weight(growth percentile) BMI OB Status 123/52 (!) 48 5' 5\" (1.651 m) 173 lb (78.5 kg) 28.79 kg/m2 Menopause Smoking Status Former Smoker Vitals History BMI and BSA Data Body Mass Index Body Surface Area 28.79 kg/m 2 1.9 m 2 Preferred Pharmacy Pharmacy Name Phone [id number], [id number] [place] [address] Your Updated Medication List  
  
   
This list is accurate as of: 3/24/17 10:10 AM.  Always use your most recent med list.  
  
  
  
  
 acetaminophen 500 mg tablet Commonly known as:  TYLENOL Take  by mouth every six (6) hours as needed for Pain. aspirin 325 mg tablet Commonly known as:  ASPIRIN Take 325 mg by mouth daily. ezetimibe 10 mg tablet Commonly known as:  Rick Jw Take  by mouth.  
  
 metoprolol tartrate 100 mg IR tablet Commonly known as:  LOPRESSOR Take 1 Tab by mouth two (2) times a day. Indications: hypertension NORVASC 10 mg tablet Generic drug:  amLODIPine Take 10 mg by mouth daily. rosuvastatin 40 mg tablet Commonly known as:  CRESTOR Take 1 Tab by mouth nightly. Prescriptions Sent to Pharmacy Refills  
 metoprolol tartrate (LOPRESSOR) 100 mg IR tablet 3 Sig: Take 1 Tab by mouth two (2) times a day. Indications: hypertension Class: Normal  
 Pharmacy: [id number], [address] [place] [address] #: [phone] Route: Oral  
  
We Performed the Following GRAM EVAL (IN PERSON) IMPLANT DEVICE,CARDVERT/DEFIB,MULT LEAD [id number] CPT(R)] Follow-up Instructions Return in about 6 months (around 9/24/2017), or if symptoms worsen or fail to improve, for post test.  
  
To-Do List   
 Around 03/24/2017 Lab:  HEPATIC FUNCTION PANEL Around 03/24/2017 Lab:  LIPID PANEL Patient Instructions Medications Discontinued During This Encounter Medication Reason  oxyCODONE-acetaminophen (PERCOCET) 5-325 mg per tablet Not A Current Medication  metoprolol (LOPRESSOR) 100 mg tablet Reorder Orders Placed This Encounter  LIPID PANEL Standing Status:   Future Standing Expiration Date:   6/24/2017  
 HEPATIC FUNCTION PANEL Standing Status:   Future Standing Expiration Date:   6/24/2017  GRAM EVAL (IN PERSON) IMPLANT DEVICE,CARDVERT/DEFIB,MULT LEAD Order Specific Question:   Reason for Exam: Answer:   icd f/u  
 metoprolol tartrate (LOPRESSOR) 100 mg IR tablet Sig: Take 1 Tab by mouth two (2) times a day. Indications: hypertension Dispense:  60 Tab Refill:  3  
 
please get remote checks for pacer or ICD every 3 months and Office check in 1 yr Please call our office after every transmission to confirm success of transmission Mediterranean Diet: Care Instructions Your Care Instructions The Mediterranean diet features foods eaten in [place], [place], [place] and [place], and other countries that border the [facility]. It emphasizes eating a diet rich in fruits, vegetables, nuts, and high-fiber grains, and limits meat, cheese, and sweets. The Mediterranean diet may: · Prevent heart disease and lower the risk of a heart attack or stroke. · Prevent type 2 diabetes. · Prevent Alzheimer's disease and other dementia. · Prevent depression. · Prevent Parkinson's disease. This diet contains more fat than other heart-healthy diets. But the fats are mainly from nuts, unsaturated oils, such as fish oils, olive oil, and certain nut or seed oils (such as canola, soybean, or flaxseed oil). These types of oils may help protect the heart and blood vessels. Follow-up care is a key part of your treatment and safety. Be sure to make and go to all appointments, and call your doctor if you are having problems. It's also a good idea to know your test results and keep a list of the medicines you take. How can you care for yourself at home? What to eat · Eat a variety of fruits and vegetables each day, such as grapes, blueberries, tomatoes, broccoli, peppers, figs, olives, spinach, eggplant, beans, lentils, and chickpeas. · Eat a variety of whole-grain foods each day, such as oats, brown rice, and whole wheat bread, pasta, and couscous. · Eat fish at least 2 times a week. Try tuna, salmon, mackerel, lake trout, herring, or sardines. · Eat moderate amounts of low-fat dairy products each day or weekly, such as milk, cheese, or yogurt. · Eat moderate amounts of poultry and eggs every 2 days or weekly. · Choose healthy (unsaturated) fats, such as nuts, olive oil and certain nut or seed oils like canola, soybean, and flaxseed. · Limit unhealthy (saturated) fats, such as butter, palm oil, and coconut oil. And limit fats found in animal products, such as meat and dairy products made with whole milk. Try to eat red meat only a few times a month in very small amounts. · Limit sweets and desserts to only a few times a week. This includes sugar-sweetened drinks like soda. The Mediterranean diet may also include red wine with your meal1 glass each day for women and up to 2 glasses a day for men. Tips for changing your diet · Dip bread in a mix of olive oil and fresh herbs instead of using butter. · Add avocado slices to your sandwich instead of juarez. · Have fish for lunch or dinner instead of red meat. Brush the fish with olive oil, and broil or grill it. · Sprinkle your salad with seeds or nuts instead of cheese. · Cook with olive or canola oil instead of butter or oils that are high in saturated fat. · Switch from 2% milk or whole milk to 1% or fat-free milk. · Dip raw vegetables in a vinaigrette dressing or hummus instead of dips made from mayonnaise or sour cream. 
· Have a piece of fruit for dessert instead of a piece of cake. Try baked apples, or have some dried fruit. Part of the Mediterranean diet is being active. Get at least 30 minutes of exercise on most days of the week. Walking is a good choice. You also may want to do other activities, such as running, swimming, cycling, or playing tennis or team sports. Where can you learn more? Go to http://eric-randee.info/. Enter O407 in the search box to learn more about \"Mediterranean Diet: Care Instructions. \" Current as of: July 26, 2016 Content Version: 11.1 © 9294-7741 [employer]. Care instructions adapted under license by [employer] (which disclaims liability or warranty for this information). If you have questions about a medical condition or this instruction, always ask your healthcare professional. [address] any warranty or liability for your use of this information. Introducing [facility] HE[facility]! Kendra Knott introduces [employer] patient portal. Now you can access parts of your medical record, email your doctor's office, and request medication refills online. 1. In your internet browser, go to [email or web address] 2. Click on the First Time User? Click Here link in the Sign In box. You will see the New Member Sign Up page. 3. Enter your [employer] Access Code exactly as it appears below. You will not need to use this code after youve completed the sign-up process.  If you do not sign up before the expiration date, you must request a new code. · Adsame Access Code: [email or web address] Expires: 6/5/2017  2:23 PM 
 
4. Enter the last four digits of your Social Security Number (xxxx) and Date of Birth (mm/dd/yyyy) as indicated and click Submit. You will be taken to the next sign-up page. 5. Create a Adsame ID. This will be your Adsame login ID and cannot be changed, so think of one that is secure and easy to remember. 6. Create a Adsame password. You can change your password at any time. 7. Enter your Password Reset Question and Answer. This can be used at a later time if you forget your password. 8. Enter your e-mail address. You will receive e-mail notification when new information is available in [address] 9. Click Sign Up. You can now view and download portions of your medical record. 10. Click the Download Summary menu link to download a portable copy of your medical information. If you have questions, please visit the Frequently Asked Questions section of the Adsame website. Remember, Adsame is NOT to be used for urgent needs. For medical emergencies, dial 911. Now available from your iPhone and Android! Please provide this summary of care documentation to your next provider. Your primary care clinician is listed as Deana Dunn. If you have any questions after today's visit, please call [phone].

## 2017-03-24 NOTE — LETTER
Nalini Smart 1950 
 
3/24/2017 Dear Orly Dunn, DO I had the pleasure of evaluating  Ms. Magda Dial in office today. Below are the relevant portions of my assessment and plan of care. ICD-10-CM ICD-9-CM 1. Atherosclerosis of native coronary artery of native heart without angina pectoris I25.10 414.01 metoprolol tartrate (LOPRESSOR) 100 mg IR tablet 3/17; 8/16 stable Symptoms; CABG 10/02 with LIMA to LAD and SVG to RCA 2. Postsurgical aortocoronary bypass status Z95.1 V45.81   
3. Essential hypertension, benign I10 401.1 metoprolol tartrate (LOPRESSOR) 100 mg IR tablet 4. ICD (implantable cardioverter-defibrillator) in place-DDD ICD Z95.810 V45.02 GRAM EVAL (IN PERSON) IMPLANT DEVICE,CARDVERT/DEFIB,MULT LEAD  
 3/17 nl function, nl vol 
  
5. Mixed hyperlipidemia E78.2 272.2 LIPID PANEL  
   HEPATIC FUNCTION PANEL  
 2/16 LDL81; 10/15, Current Outpatient Prescriptions Medication Sig Dispense Refill  metoprolol tartrate (LOPRESSOR) 100 mg IR tablet Take 1 Tab by mouth two (2) times a day. Indications: hypertension 60 Tab 3  
 acetaminophen (TYLENOL) 500 mg tablet Take  by mouth every six (6) hours as needed for Pain.  ezetimibe (ZETIA) 10 mg tablet Take  by mouth.  rosuvastatin (CRESTOR) 40 mg tablet Take 1 Tab by mouth nightly. 30 Tab 4  
 amLODIPine (NORVASC) 10 mg tablet Take 10 mg by mouth daily.  aspirin (ASPIRIN) 325 mg tablet Take 325 mg by mouth daily. Orders Placed This Encounter  LIPID PANEL Standing Status:   Future Standing Expiration Date:   6/24/2017  
 HEPATIC FUNCTION PANEL Standing Status:   Future Standing Expiration Date:   6/24/2017  GRAM EVAL (IN PERSON) IMPLANT DEVICE,CARDVERT/DEFIB,MULT LEAD Order Specific Question:   Reason for Exam: Answer:   icd f/u  
 metoprolol tartrate (LOPRESSOR) 100 mg IR tablet Sig: Take 1 Tab by mouth two (2) times a day. Indications: hypertension Dispense:  60 Tab Refill:  3 If you have questions, please do not hesitate to call me. I look forward to following Ms. Geeta Valdez along with you. Sincerely, Baron Jacinto MD

## 2017-03-24 NOTE — PROGRESS NOTES
1. Have you been to the ER, urgent care clinic since your last visit? Hospitalized since your last visit? No    2. Have you seen or consulted any other health care providers outside of the [address] [place] [address] since your last visit? Include any pap smears or colon screening. No     3. Since your last visit, have you had any of the following symptoms? .           4. Have you had any blood work, X-rays or cardiac testing? No             5.  Where do you normally have your labs drawn? [facility]    6. Do you need any refills today?    No

## 2017-03-24 NOTE — PROGRESS NOTES
HISTORY OF PRESENT ILLNESS  Kajal Waters is a 74 y.o. female. HPI Comments: Patient denies significant chest pain, SOB, palpitations, edema, dizziness      Cholesterol Problem   The history is provided by the medical records. This is a chronic problem. Pertinent negatives include no chest pain, no headaches and no shortness of breath. Hypertension   The history is provided by the medical records. This is a chronic problem. Pertinent negatives include no chest pain, no headaches and no shortness of breath. Review of Systems   Constitutional: Negative for chills, fever, malaise/fatigue and weight loss. HENT: Negative for nosebleeds. Eyes: Negative for discharge. Respiratory: Negative for cough, shortness of breath and wheezing. Cardiovascular: Negative for chest pain, palpitations, orthopnea, claudication, leg swelling and PND. Gastrointestinal: Negative for diarrhea, nausea and vomiting. Genitourinary: Negative for dysuria and hematuria. Musculoskeletal: Negative for joint pain. Skin: Negative for rash. Neurological: Negative for dizziness, seizures, loss of consciousness and headaches. Endo/Heme/Allergies: Negative for polydipsia. Does not bruise/bleed easily. Psychiatric/Behavioral: Negative for depression and substance abuse. The patient does not have insomnia.       Allergies   Allergen Reactions    Losartan Angioedema     Tongue swelling but not on lips       Past Medical History:   Diagnosis Date    Arthritis     Automatic implantable cardiac defibrillator in situ     Coronary atherosclerosis of native coronary artery     CABG 10/02 with LIMA to LAD and SVG to RCA    Depression     Essential hypertension     Headache(784.0)     Other and unspecified hyperlipidemia     9/12 Low density lipoproteins are at goal, triglycerides are at goal, high density lipoproteins are at goal.    Postsurgical aortocoronary bypass status 2002    Presence of permanent cardiac pacemaker-DDD 11/6/2015    OFFICE CHECK : 8/14 nl function, +AHR rare(changed detection to over 150/mt); 11/15 nl function     Unspecified arthropathy, lower leg     S/P Left knee replacement. Had repeat surgery's for her left knee.  UTI (urinary tract infection)        Family History   Problem Relation Age of Onset    Arthritis-osteo Other      not specified    Hypertension Other      not specified    Sudden Death Neg Hx     Heart Attack Neg Hx        Social History   Substance Use Topics    Smoking status: Former Smoker     Quit date: 4/25/2002    Smokeless tobacco: None    Alcohol use No        Current Outpatient Prescriptions   Medication Sig    acetaminophen (TYLENOL) 500 mg tablet Take  by mouth every six (6) hours as needed for Pain.  ezetimibe (ZETIA) 10 mg tablet Take  by mouth.  rosuvastatin (CRESTOR) 40 mg tablet Take 1 Tab by mouth nightly.  metoprolol (LOPRESSOR) 100 mg tablet Take 100 mg by mouth daily.  amLODIPine (NORVASC) 10 mg tablet Take 10 mg by mouth daily.  aspirin (ASPIRIN) 325 mg tablet Take 325 mg by mouth daily. No current facility-administered medications for this visit. Past Surgical History:   Procedure Laterality Date    CARDIAC SURG PROCEDURE UNLIST  11/09    gen change-[facility]    HX CORONARY ARTERY BYPASS GRAFT  10/2002    HX ORTHOPAEDIC  [id number]    Reduced two component revision using the Sigma System. Lateral release. Suture anchor repair of medial collateral ligament. Therese Arana  [id number]    Left knee arthrofibrosis, status post significant scarring, status post left total knee replacement    VASCULAR SURGERY PROCEDURE UNLIST  2005    Aorto biffem.   Dr. Juliane Gant       Diagnostic Studies:  CARDIOLOGY STUDIES 2/13/2013 1/1/2013 1/1/2009 2/1/2008 10/1/2002   Myocardial Perfusion Scan Result - ant ischemia, nl EF - abn scan, fixed anterolateral wall defect with partially reversible defect, EF 70% -   Cardiac Cath Result patent LIMA, sev native CAD including LM, med Rx for now - - - -   Echocardiogram - Complete Result - - EF 65%, PAP 33 - -   Coronary Angiogram Result - - - - Bypass x's 2       Visit Vitals    /69    Pulse 67    Ht 5' 5\" (1.651 m)    Wt 78.5 kg (173 lb)    BMI 28.79 kg/m2       Ms. Samia Valdez has a reminder for a \"due or due soon\" health maintenance. I have asked that she contact her primary care provider for follow-up on this health maintenance. Physical Exam   Constitutional: She is oriented to person, place, and time. She appears well-developed and well-nourished. No distress. HENT:   Head: Normocephalic and atraumatic. Eyes: Right eye exhibits no discharge. Left eye exhibits no discharge. No scleral icterus. Neck: Neck supple. No JVD present. Carotid bruit is not present. No thyromegaly present. Cardiovascular: Normal rate, regular rhythm, S1 normal, S2 normal, normal heart sounds and intact distal pulses. Exam reveals no gallop and no friction rub. No murmur heard. Pulmonary/Chest: Effort normal and breath sounds normal. She has no wheezes. She has no rales. Abdominal: Soft. She exhibits no mass. There is no tenderness. Musculoskeletal: She exhibits no edema. Cant put legs straight   Neurological: She is alert and oriented to person, place, and time. Skin: Skin is warm and dry. No rash noted. Psychiatric: She has a normal mood and affect. Her behavior is normal.   wounds healing well left subclavicular area     ASSESSMENT and Kenrick Arriaga was seen today for coronary artery disease, cholesterol problem and hypertension. Diagnoses and all orders for this visit:    Atherosclerosis of native coronary artery of native heart without angina pectoris  Comments:  3/17; 8/16 stable Symptoms; CABG 10/02 with LIMA to LAD and SVG to RCA    Orders:  -     metoprolol tartrate (LOPRESSOR) 100 mg IR tablet; Take 1 Tab by mouth two (2) times a day.  Indications: hypertension    Postsurgical aortocoronary bypass status    Essential hypertension, benign  -     metoprolol tartrate (LOPRESSOR) 100 mg IR tablet; Take 1 Tab by mouth two (2) times a day. Indications: hypertension    ICD (implantable cardioverter-defibrillator) in place-DDD ICD  Comments:  3/17 nl function, nl vol    Orders:  -     GRAM EVAL (IN PERSON) IMPLANT DEVICE,CARDVERT/DEFIB,MULT LEAD    Mixed hyperlipidemia  Comments:  2/16 LDL81; 10/15,  Orders:  -     LIPID PANEL; Future  -     HEPATIC FUNCTION PANEL; Future        Pertinent laboratory and test data reviewed and discussed with patient.   See patient instructions also for other medical advice given    Medications Discontinued During This Encounter   Medication Reason    oxyCODONE-acetaminophen (PERCOCET) 5-325 mg per tablet Not A Current Medication    metoprolol (LOPRESSOR) 100 mg tablet Reorder       Follow-up Disposition:  Return in about 6 months (around 9/24/2017), or if symptoms worsen or fail to improve, for post test.

## 2017-04-06 ENCOUNTER — OFFICE VISIT (OUTPATIENT)
Dept: CARDIOLOGY CLINIC | Age: 67
End: 2017-04-06

## 2017-04-06 VITALS
BODY MASS INDEX: 29.32 KG/M2 | DIASTOLIC BLOOD PRESSURE: 64 MMHG | OXYGEN SATURATION: 98 % | HEART RATE: 65 BPM | HEIGHT: 65 IN | SYSTOLIC BLOOD PRESSURE: 122 MMHG | WEIGHT: 176 LBS

## 2017-04-06 DIAGNOSIS — Z95.810 ICD (IMPLANTABLE CARDIOVERTER-DEFIBRILLATOR) IN PLACE: Primary | ICD-10-CM

## 2017-04-06 NOTE — MR AVS SNAPSHOT
Visit Information Date & Time Provider Department Dept. Phone Encounter #  
 4/6/2017  3:20 PM Campbell Merdeith MD [facility] [address] [id number] Follow-up Instructions Follow-up and Disposition History Your Appointments 4/20/2017  2:15 PM  
Follow Up with Tomás Rmi [facility] ([address]) Appt Note: wound check per Dr. Jazmin Bazan [place] Code [id number]  
[phone] [address] [facility] [address] [address]  
  
    
 10/2/2017 10:00 AM  
ESTABLISHED PATIENT with Jacob Lambert MD  
[facility] ([address]) Appt Note: 6 month post lipid/hepatic [address] [facility] [address]  
  
   
 [address] [place] Code [address]19 Upcoming Health Maintenance Date Due Hepatitis C Screening 1950 DTaP/Tdap/Td series (1 - Tdap) 12/3/1969 BREAST CANCER SCRN MAMMOGRAM 12/3/1998 FOBT Q 1 YEAR AGE 50-75 12/3/1998 ZOSTER VACCINE AGE 60> 12/3/2008 GLAUCOMA SCREENING Q2Y 12/3/2013 OSTEOPOROSIS SCREENING (DEXA) 12/3/2013 Pneumococcal 65+ Low/Medium Risk (1 of 2 - PCV13) 12/3/2013 MEDICARE YEARLY EXAM 12/3/2013 INFLUENZA AGE 9 TO ADULT 8/1/2016 Allergies as of 4/6/2017  Review Complete On: 4/6/2017 By: Campbell Meredith MD  
  
 Severity Noted Reaction Type Reactions Losartan High 10/22/2013   Intolerance Angioedema Tongue swelling but not on lips Current Immunizations  Never Reviewed No immunizations on file. Not reviewed this visit You Were Diagnosed With   
  
 Codes Comments ICD (implantable cardioverter-defibrillator) in place    -  Primary ICD-10-CM: Z95.810 ICD-9-CM: V45.02 Vitals BP Pulse Height(growth percentile) Weight(growth percentile) SpO2 BMI  
 122/64 65 5' 5\" (1.651 m) 176 lb (79.8 kg) 98% 29.29 kg/m2 OB Status Smoking Status Menopause Former Smoker BMI and BSA Data Body Mass Index Body Surface Area  
 29.29 kg/m 2 1.91 m 2 Preferred Pharmacy Pharmacy Name Phone [phone], [address] Your Updated Medication List  
  
   
This list is accurate as of: 4/6/17  3:36 PM.  Always use your most recent med list.  
  
  
  
  
 acetaminophen 500 mg tablet Commonly known as:  TYLENOL Take  by mouth every six (6) hours as needed for Pain. aspirin 325 mg tablet Commonly known as:  ASPIRIN Take 325 mg by mouth daily. ezetimibe 10 mg tablet Commonly known as:  Alverna Blackey Take  by mouth.  
  
 metoprolol tartrate 100 mg IR tablet Commonly known as:  LOPRESSOR Take 1 Tab by mouth two (2) times a day. Indications: hypertension NORVASC 10 mg tablet Generic drug:  amLODIPine Take 10 mg by mouth daily. rosuvastatin 40 mg tablet Commonly known as:  CRESTOR Take 1 Tab by mouth nightly. Introducing [facility]! [place] introduces Cleverlize patient portal. Now you can access parts of your medical record, email your doctor's office, and request medication refills online. 1. In your internet browser, go to [email or web address] 2. Click on the First Time User? Click Here link in the Sign In box. You will see the New Member Sign Up page. 3. Enter your Cleverlize Access Code exactly as it appears below. You will not need to use this code after youve completed the sign-up process. If you do not sign up before the expiration date, you must request a new code. · Cleverlize Access Code: [email or web address] Expires: 6/5/2017  2:23 PM 
 
4. Enter the last four digits of your Social Security Number (xxxx) and Date of Birth (mm/dd/yyyy) as indicated and click Submit. You will be taken to the next sign-up page. 5. Create a InboxFever ID. This will be your InboxFever login ID and cannot be changed, so think of one that is secure and easy to remember. 6. Create a InboxFever password. You can change your password at any time. 7. Enter your Password Reset Question and Answer. This can be used at a later time if you forget your password. 8. Enter your e-mail address. You will receive e-mail notification when new information is available in [address] 9. Click Sign Up. You can now view and download portions of your medical record. 10. Click the Download Summary menu link to download a portable copy of your medical information. If you have questions, please visit the Frequently Asked Questions section of the InboxFever website. Remember, InboxFever is NOT to be used for urgent needs. For medical emergencies, dial 911. Now available from your iPhone and Android! Please provide this summary of care documentation to your next provider. Your primary care clinician is listed as Deana Dunn. If you have any questions after today's visit, please call [phone].

## 2017-04-06 NOTE — PROGRESS NOTES
History of Present Illness:  A 74 y.o. female here for a wound check. She is doing well since I changed out her device a few weeks ago. No complaints of dyspnea, chest pain, fevers, chills. Impression:  Recent AICD change-out with small suture on incision line which I removed. No evidence of ongoing infection. I will have her seen in my office in a few weeks for a wound check and then she will continue to follow up with Dr. Reyes Rowland, her primary cardiologist.  All questions answered. Past Medical History:   Diagnosis Date    Arthritis     Automatic implantable cardiac defibrillator in situ     Coronary atherosclerosis of native coronary artery     CABG 10/02 with LIMA to LAD and SVG to RCA    Depression     Essential hypertension     Headache(784.0)     Other and unspecified hyperlipidemia     9/12 Low density lipoproteins are at goal, triglycerides are at goal, high density lipoproteins are at goal.    Postsurgical aortocoronary bypass status 2002    Presence of permanent cardiac pacemaker-DDD 11/6/2015    OFFICE CHECK : 8/14 nl function, +AHR rare(changed detection to over 150/mt); 11/15 nl function     Unspecified arthropathy, lower leg     S/P Left knee replacement. Had repeat surgery's for her left knee.  UTI (urinary tract infection)        Current Outpatient Prescriptions   Medication Sig Dispense Refill    metoprolol tartrate (LOPRESSOR) 100 mg IR tablet Take 1 Tab by mouth two (2) times a day. Indications: hypertension 60 Tab 3    acetaminophen (TYLENOL) 500 mg tablet Take  by mouth every six (6) hours as needed for Pain.  ezetimibe (ZETIA) 10 mg tablet Take  by mouth.  rosuvastatin (CRESTOR) 40 mg tablet Take 1 Tab by mouth nightly. 30 Tab 4    amLODIPine (NORVASC) 10 mg tablet Take 10 mg by mouth daily.  aspirin (ASPIRIN) 325 mg tablet Take 325 mg by mouth daily. Social History   reports that she quit smoking about 14 years ago.  She does not have any smokeless tobacco history on file. reports that she does not drink alcohol. Family History  family history includes Arthritis-osteo in an other family member; Hypertension in an other family member. There is no history of Sudden Death or Heart Attack. Review of Systems  Except as stated above include:  Constitutional: Negative for fever, chills and malaise/fatigue. HEENT: No congestion or recent URI. Gastrointestinal: No nausea, vomiting, abdominal pain, bloody stools. Pulmonary:  Negative except as stated above. Cardiac:  Negative except as stated above. Musculoskeletal: Negative except as stated above. Neurological:  No localized symptoms. Skin:  Negative except as stated above. Psych:  No mood swings. Endocrine:  No heat/cold intolerance. PHYSICAL EXAM  BP Readings from Last 3 Encounters:   04/06/17 122/64   03/24/17 123/52   03/15/17 169/76     Pulse Readings from Last 3 Encounters:   04/06/17 65   03/24/17 (!) 48   03/15/17 (!) 58     Wt Readings from Last 3 Encounters:   04/06/17 79.8 kg (176 lb)   03/24/17 78.5 kg (173 lb)   03/15/17 78.9 kg (174 lb)     General:   Well developed, well groomed. Head/Neck:   No jugular venous distention     No carotid bruits. No evidence of xanthelasma. Heart:    Regular rate and rhythm. Normal S1/S2. Palpation of heart with normal point of maximum impulse. No significant murmurs, rubs or gallops. Left AICD pocket intact, small superficial suture which I removed. Neurological:   Alert and oriented to person, place, time. No focal neurological deficit visually.

## 2017-04-06 NOTE — PROGRESS NOTES
1. Have you been to the ER, urgent care clinic since your last visit? Hospitalized since your last visit? Yes, 3/15/17 for gen change     2. Have you seen or consulted any other health care providers outside of the [address] [place] [address] since your last visit? Include any pap smears or colon screening.   No

## 2017-04-20 ENCOUNTER — CLINICAL SUPPORT (OUTPATIENT)
Dept: CARDIOLOGY CLINIC | Age: 67
End: 2017-04-20

## 2017-04-20 DIAGNOSIS — I42.9 CARDIOMYOPATHY, UNSPECIFIED TYPE (HCC): Primary | ICD-10-CM

## 2017-04-20 DIAGNOSIS — Z95.810 ICD (IMPLANTABLE CARDIOVERTER-DEFIBRILLATOR) IN PLACE: ICD-10-CM

## 2017-05-11 ENCOUNTER — OFFICE VISIT (OUTPATIENT)
Dept: CARDIOLOGY CLINIC | Age: 67
End: 2017-05-11

## 2017-05-11 DIAGNOSIS — Z95.810 ICD (IMPLANTABLE CARDIOVERTER-DEFIBRILLATOR) IN PLACE: Primary | ICD-10-CM

## 2017-05-11 NOTE — PROGRESS NOTES
Wound was intact. It was free of redness, drainage and swelling. Patient education on signs and symptoms of infection.

## 2017-05-24 LAB
A-G RATIO,AGRAT: 1.2 RATIO (ref 1.1–2.6)
ALBUMIN SERPL-MCNC: 3.9 G/DL (ref 3.5–5)
ALP SERPL-CCNC: 73 U/L (ref 40–120)
ALT SERPL-CCNC: 14 U/L (ref 5–40)
AST SERPL W P-5'-P-CCNC: 22 U/L (ref 10–37)
BILIRUB SERPL-MCNC: 0.3 MG/DL (ref 0.2–1.2)
BILIRUBIN, DIRECT,CBIL: <0.2 MG/DL (ref 0–0.3)
CHOLEST SERPL-MCNC: 139 MG/DL (ref 110–200)
GLOBULIN,GLOB: 3.2 G/DL (ref 2–4)
HDLC SERPL-MCNC: 55 MG/DL (ref 40–59)
LDLC SERPL CALC-MCNC: 69 MG/DL (ref 50–99)
PROT SERPL-MCNC: 7.1 G/DL (ref 6.2–8.1)
TRIGL SERPL-MCNC: 74 MG/DL (ref 40–149)
VLDLC SERPL CALC-MCNC: 15 MG/DL (ref 8–30)

## 2017-07-20 DIAGNOSIS — I10 ESSENTIAL HYPERTENSION, BENIGN: ICD-10-CM

## 2017-07-20 DIAGNOSIS — I25.10 ATHEROSCLEROSIS OF NATIVE CORONARY ARTERY OF NATIVE HEART WITHOUT ANGINA PECTORIS: ICD-10-CM

## 2017-07-20 RX ORDER — METOPROLOL TARTRATE 100 MG/1
TABLET ORAL
Qty: 60 TAB | Refills: 3 | Status: SHIPPED | OUTPATIENT
Start: 2017-07-20 | End: 2017-11-19 | Stop reason: SDUPTHER

## 2017-10-02 ENCOUNTER — OFFICE VISIT (OUTPATIENT)
Dept: CARDIOLOGY CLINIC | Age: 67
End: 2017-10-02

## 2017-10-02 VITALS
HEART RATE: 55 BPM | WEIGHT: 171 LBS | DIASTOLIC BLOOD PRESSURE: 56 MMHG | SYSTOLIC BLOOD PRESSURE: 142 MMHG | BODY MASS INDEX: 28.49 KG/M2 | HEIGHT: 65 IN

## 2017-10-02 DIAGNOSIS — E78.2 MIXED HYPERLIPIDEMIA: ICD-10-CM

## 2017-10-02 DIAGNOSIS — Z95.810 ICD (IMPLANTABLE CARDIOVERTER-DEFIBRILLATOR) IN PLACE: ICD-10-CM

## 2017-10-02 DIAGNOSIS — Z95.1 POSTSURGICAL AORTOCORONARY BYPASS STATUS: ICD-10-CM

## 2017-10-02 DIAGNOSIS — I10 ESSENTIAL HYPERTENSION, BENIGN: ICD-10-CM

## 2017-10-02 DIAGNOSIS — Z72.0 TOBACCO ABUSE: ICD-10-CM

## 2017-10-02 DIAGNOSIS — I25.10 ATHEROSCLEROSIS OF NATIVE CORONARY ARTERY OF NATIVE HEART WITHOUT ANGINA PECTORIS: Primary | ICD-10-CM

## 2017-10-02 DIAGNOSIS — Z01.810 PREOP CARDIOVASCULAR EXAM: ICD-10-CM

## 2017-10-02 NOTE — PROGRESS NOTES
HISTORY OF PRESENT ILLNESS  Rusty Butt is a 74 y.o. female. HPI Comments: Seen preop for vasc surgery by Dr Sherren Breen    Patient denies significant chest pain, SOB, palpitations, edema, dizziness      Hypertension   The history is provided by the medical records. This is a chronic problem. Pertinent negatives include no chest pain, no headaches and no shortness of breath. Cholesterol Problem   The history is provided by the medical records. This is a chronic problem. Pertinent negatives include no chest pain, no headaches and no shortness of breath. Review of Systems   Constitutional: Negative for chills, fever, malaise/fatigue and weight loss. HENT: Negative for nosebleeds. Eyes: Negative for discharge. Respiratory: Negative for cough, shortness of breath and wheezing. Cardiovascular: Negative for chest pain, palpitations, orthopnea, claudication, leg swelling and PND. Gastrointestinal: Negative for diarrhea, nausea and vomiting. Genitourinary: Negative for dysuria and hematuria. Musculoskeletal: Negative for joint pain. Skin: Negative for rash. Neurological: Negative for dizziness, seizures, loss of consciousness and headaches. Endo/Heme/Allergies: Negative for polydipsia. Does not bruise/bleed easily. Psychiatric/Behavioral: Negative for depression and substance abuse. The patient does not have insomnia.       Allergies   Allergen Reactions    Losartan Angioedema     Tongue swelling but not on lips       Past Medical History:   Diagnosis Date    Arthritis     Automatic implantable cardiac defibrillator in situ     Coronary atherosclerosis of native coronary artery     CABG 10/02 with LIMA to LAD and SVG to RCA    Depression     Essential hypertension     Headache(784.0)     Other and unspecified hyperlipidemia     9/12 Low density lipoproteins are at goal, triglycerides are at goal, high density lipoproteins are at goal.    Postsurgical aortocoronary bypass status 2002  Presence of permanent cardiac pacemaker-DDD 11/6/2015    OFFICE CHECK : 8/14 nl function, +AHR rare(changed detection to over 150/mt); 11/15 nl function     Unspecified arthropathy, lower leg     S/P Left knee replacement. Had repeat surgery's for her left knee.  UTI (urinary tract infection)        Family History   Problem Relation Age of Onset    Arthritis-osteo Other      not specified    Hypertension Other      not specified    Sudden Death Neg Hx     Heart Attack Neg Hx        Social History   Substance Use Topics    Smoking status: Former Smoker     Quit date: 4/25/2002    Smokeless tobacco: Never Used    Alcohol use No        Current Outpatient Prescriptions   Medication Sig    metoprolol tartrate (LOPRESSOR) 100 mg IR tablet take 1 tablet by mouth twice a day    acetaminophen (TYLENOL) 500 mg tablet Take  by mouth every six (6) hours as needed for Pain.  ezetimibe (ZETIA) 10 mg tablet Take  by mouth.  rosuvastatin (CRESTOR) 40 mg tablet Take 1 Tab by mouth nightly.  amLODIPine (NORVASC) 10 mg tablet Take 10 mg by mouth daily.  aspirin (ASPIRIN) 325 mg tablet Take 325 mg by mouth daily. No current facility-administered medications for this visit. Past Surgical History:   Procedure Laterality Date    CARDIAC SURG PROCEDURE UNLIST  11/09    gen change-[facility]    HX CORONARY ARTERY BYPASS GRAFT  10/2002    HX ORTHOPAEDIC  [id number]    Reduced two component revision using the Sigma System. Lateral release. Suture anchor repair of medial collateral ligament. Miriam Zhao  [id number]    Left knee arthrofibrosis, status post significant scarring, status post left total knee replacement    VASCULAR SURGERY PROCEDURE UNLIST  2005    Aorto biffem.   Dr. Tyree Adamson       Diagnostic Studies:  CARDIOLOGY STUDIES 2/13/2013 1/1/2013 1/1/2009 2/1/2008 10/1/2002   Myocardial Perfusion Scan Result - ant ischemia, nl EF - abn scan, fixed anterolateral wall defect with partially reversible defect, EF 70% -   Cardiac Cath Result patent LIMA, sev native CAD including LM, med Rx for now - - - -   Echocardiogram - Complete Result - - EF 65%, PAP 33 - -   Coronary Angiogram Result - - - - Bypass x's 2   Some recent data might be hidden       Visit Vitals    /56    Pulse (!) 55    Ht 5' 5\" (1.651 m)    Wt 77.6 kg (171 lb)    BMI 28.46 kg/m2       Ms. Glo Rodriguez has a reminder for a \"due or due soon\" health maintenance. I have asked that she contact her primary care provider for follow-up on this health maintenance. Physical Exam   Constitutional: She is oriented to person, place, and time. She appears well-developed and well-nourished. No distress. kyphosis   HENT:   Head: Normocephalic and atraumatic. Eyes: Right eye exhibits no discharge. Left eye exhibits no discharge. No scleral icterus. Neck: Neck supple. No JVD present. Carotid bruit is not present. No thyromegaly present. Cardiovascular: Normal rate, regular rhythm, S1 normal, S2 normal, normal heart sounds and intact distal pulses. Exam reveals no gallop and no friction rub. No murmur heard. Pulmonary/Chest: Effort normal and breath sounds normal. She has no wheezes. She has no rales. Abdominal: Soft. She exhibits no mass. There is no tenderness. Musculoskeletal: She exhibits no edema. Cant put legs straight   Neurological: She is alert and oriented to person, place, and time. Skin: Skin is warm and dry. No rash noted. Psychiatric: She has a normal mood and affect. Her behavior is normal.   wounds healing well left subclavicular area     ASSESSMENT and PLAN            Diagnoses and all orders for this visit:    1. Atherosclerosis of native coronary artery of native heart without angina pectoris  Comments:  9/17; 3/17; 8/16 stable Symptoms; CABG 10/02 with LIMA to LAD and SVG to RCA    Orders:  -     SCHEDULE NUCLEAR STUDY    2.  ICD (implantable cardioverter-defibrillator) in place-DDD ICD  Comments:  8/17 nl function, nl     3. Postsurgical aortocoronary bypass status    4. Essential hypertension, benign  Comments:  controlled      5. Preop cardiovascular exam  Comments:  10/17 pending stress test  Orders:  -     SCHEDULE NUCLEAR STUDY    6. Mixed hyperlipidemia  Comments:  5/17 LDL69; 2/16 LDL81;    7. Tobacco abuse  -     [place] SMOKING AND TOBACCO USE CESSATION 3 - 10 MINUTES        Pertinent laboratory and test data reviewed and discussed with patient. See patient instructions also for other medical advice given    There are no discontinued medications.     Follow-up Disposition:  Return if symptoms worsen or fail to improve, for same day post test.

## 2017-10-02 NOTE — PATIENT INSTRUCTIONS
There are no discontinued medications. Orders Placed This Encounter    SCHEDULE NUCLEAR STUDY     pharmacologic    RI SMOKING AND TOBACCO USE CESSATION 3 - 10 MINUTES          Stopping Smoking: Care Instructions  Your Care Instructions  Cigarette smokers crave the nicotine in cigarettes. Giving it up is much harder than simply changing a habit. Your body has to stop craving the nicotine. It is hard to quit, but you can do it. There are many tools that people use to quit smoking. You may find that combining tools works best for you. There are several steps to quitting. First you get ready to quit. Then you get support to help you. After that, you learn new skills and behaviors to become a nonsmoker. For many people, a necessary step is getting and using medicine. Your doctor will help you set up the plan that best meets your needs. You may want to attend a smoking cessation program to help you quit smoking. When you choose a program, look for one that has proven success. Ask your doctor for ideas. You will greatly increase your chances of success if you take medicine as well as get counseling or join a cessation program.  Some of the changes you feel when you first quit tobacco are uncomfortable. Your body will miss the nicotine at first, and you may feel short-tempered and grumpy. You may have trouble sleeping or concentrating. Medicine can help you deal with these symptoms. You may struggle with changing your smoking habits and rituals. The last step is the tricky one: Be prepared for the smoking urge to continue for a time. This is a lot to deal with, but keep at it. You will feel better. Follow-up care is a key part of your treatment and safety. Be sure to make and go to all appointments, and call your doctor if you are having problems. Its also a good idea to know your test results and keep a list of the medicines you take. How can you care for yourself at home?   · Ask your family, friends, and coworkers for support. You have a better chance of quitting if you have help and support. · Join a support group, such as Nicotine Anonymous, for people who are trying to quit smoking. · Consider signing up for a smoking cessation program, such as the American Lung Association's Freedom from Smoking program.  · Set a quit date. Pick your date carefully so that it is not right in the middle of a big deadline or stressful time. Once you quit, do not even take a puff. Get rid of all ashtrays and lighters after your last cigarette. Clean your house and your clothes so that they do not smell of smoke. · Learn how to be a nonsmoker. Think about ways you can avoid those things that make you reach for a cigarette. ¨ Avoid situations that put you at greatest risk for smoking. For some people, it is hard to have a drink with friends without smoking. For others, they might skip a coffee break with coworkers who smoke. ¨ Change your daily routine. Take a different route to work or eat a meal in a different place. · Cut down on stress. Calm yourself or release tension by doing an activity you enjoy, such as reading a book, taking a hot bath, or gardening. · Talk to your doctor or pharmacist about nicotine replacement therapy, which replaces the nicotine in your body. You still get nicotine but you do not use tobacco. Nicotine replacement products help you slowly reduce the amount of nicotine you need. These products come in several forms, many of them available over-the-counter:  ¨ Nicotine patches  ¨ Nicotine gum and lozenges  ¨ Nicotine inhaler  · Ask your doctor about bupropion (Wellbutrin) or varenicline (Chantix), which are prescription medicines. They do not contain nicotine. They help you by reducing withdrawal symptoms, such as stress and anxiety. · Some people find hypnosis, acupuncture, and massage helpful for ending the smoking habit. · Eat a healthy diet and get regular exercise.  Having healthy habits will help your body move past its craving for nicotine. · Be prepared to keep trying. Most people are not successful the first few times they try to quit. Do not get mad at yourself if you smoke again. Make a list of things you learned and think about when you want to try again, such as next week, next month, or next year. Where can you learn more? Go to http://eric-randee.info/. Enter J493 in the search box to learn more about \"Stopping Smoking: Care Instructions. \"  Current as of: March 20, 2017  Content Version: 11.3  © 8583-5663 [employer]. Care instructions adapted under license by [employer] (which disclaims liability or warranty for this information). If you have questions about a medical condition or this instruction, always ask your healthcare professional. [address] any warranty or liability for your use of this information.

## 2017-10-02 NOTE — PROGRESS NOTES
1. Have you been to the ER, urgent care clinic since your last visit? Hospitalized since your last visit? No    2. Have you seen or consulted any other health care providers outside of the [address] [place] [address] since your last visit? Include any pap smears or colon screening. Yes Where: Dr Moeller [facility]     3. Since your last visit, have you had any of the following symptoms? .         4. Have you had any blood work, X-rays or cardiac testing? Yes Where: [facility] [address] Reason for visit: Labs              5.  Where do you normally have your labs drawn? [facility]    6. Do you need any refills today?    No

## 2017-10-02 NOTE — LETTER
Araceli Xiomara 1950 
 
10/2/2017 Dear Julian Knox, Paul Amador MD 
 
I had the pleasure of evaluating  Ms. Estelita Tsai in office today. Below are the relevant portions of my assessment and plan of care. ICD-10-CM ICD-9-CM 1. Atherosclerosis of native coronary artery of native heart without angina pectoris I25.10 414.01 SCHEDULE NUCLEAR STUDY  
 9/17; 3/17; 8/16 stable Symptoms; CABG 10/02 with LIMA to LAD and SVG to RCA 2. ICD (implantable cardioverter-defibrillator) in place-DDD ICD Z95.810 V45.02   
 8/17 nl function, nl   
3. Postsurgical aortocoronary bypass status Z95.1 V45.81   
4. Essential hypertension, benign I10 401.1   
 controlled 5. Preop cardiovascular exam Z01.810 V72.81 SCHEDULE NUCLEAR STUDY  
 10/17 pending stress test  
6. Mixed hyperlipidemia E78.2 272.2 5/17 LDL69; 2/16 LDL81;  
7. Tobacco abuse Z72.0 305.1 [place] SMOKING AND TOBACCO USE CESSATION 3 - 10 MINUTES Current Outpatient Prescriptions Medication Sig Dispense Refill  metoprolol tartrate (LOPRESSOR) 100 mg IR tablet take 1 tablet by mouth twice a day 60 Tab 3  
 acetaminophen (TYLENOL) 500 mg tablet Take  by mouth every six (6) hours as needed for Pain.  ezetimibe (ZETIA) 10 mg tablet Take  by mouth.  rosuvastatin (CRESTOR) 40 mg tablet Take 1 Tab by mouth nightly. 30 Tab 4  
 amLODIPine (NORVASC) 10 mg tablet Take 10 mg by mouth daily.  aspirin (ASPIRIN) 325 mg tablet Take 325 mg by mouth daily. Orders Placed This Encounter  SCHEDULE NUCLEAR STUDY pharmacologic  [place] SMOKING AND TOBACCO USE CESSATION 3 - 10 MINUTES If you have questions, please do not hesitate to call me. I look forward to following Ms. Estelita Tsai along with you. Sincerely, Jefry Cotto MD

## 2017-10-02 NOTE — MR AVS SNAPSHOT
Visit Information Date & Time Provider Department Dept. Phone Encounter #  
 10/2/2017 10:00 AM Cynthia Ray MD [facility] [phone] Follow-up Instructions Return if symptoms worsen or fail to improve, for same day post test.  
  
Your Appointments 12/6/2017  8:00 AM  
[facility] [place] ([facility] Appt Note: Carelink Transmission/[place]/Wireless [address] [place] [address] [address]  
  
   
 [address] [address] [address] [address]  
  
    
 3/23/2018 11:15 AM  
PROCEDURE with Cynthia Ray MD  
[facility] ([facility]) Appt Note: 1 year in office pacer check [place] [place] [address] [address]  
  
   
 [address] [phone] [address] [address] Upcoming Health Maintenance Date Due Hepatitis C Screening 1950 DTaP/Tdap/Td series (1 - Tdap) 12/3/1969 BREAST CANCER SCRN MAMMOGRAM 12/3/1998 FOBT Q 1 YEAR AGE 50-75 12/3/1998 ZOSTER VACCINE AGE 60> 10/3/2008 GLAUCOMA SCREENING Q2Y 12/3/2013 OSTEOPOROSIS SCREENING (DEXA) 12/3/2013 Pneumococcal 65+ Low/Medium Risk (1 of 2 - PCV13) 12/3/2013 MEDICARE YEARLY EXAM 12/3/2013 INFLUENZA AGE 9 TO ADULT 8/1/2017 Allergies as of 10/2/2017  Review Complete On: 10/2/2017 By: Cynthia Ray MD  
  
 Severity Noted Reaction Type Reactions Losartan High 10/22/2013   Intolerance Angioedema Tongue swelling but not on lips Current Immunizations  Never Reviewed No immunizations on file. Not reviewed this visit You Were Diagnosed With   
  
 Codes Comments Atherosclerosis of native coronary artery of native heart without angina pectoris    -  Primary ICD-10-CM: I25.10 ICD-9-CM: 414.01 9/17; 3/17; 8/16 stable Symptoms; CABG 10/02 with LIMA to LAD and SVG to RCA 
  
 ICD (implantable cardioverter-defibrillator) in place     ICD-10-CM: Z95.810 ICD-9-CM: V45.02 8/17 nl function, nl   
 Postsurgical aortocoronary bypass status     ICD-10-CM: Z95.1 ICD-9-CM: V45.81 Essential hypertension, benign     ICD-10-CM: I10 
ICD-9-CM: 401.1 controlled Preop cardiovascular exam     ICD-10-CM: Z01.810 ICD-9-CM: V72.81 10/17 pending stress test  
 Mixed hyperlipidemia     ICD-10-CM: E78.2 ICD-9-CM: 272.2 5/17 LDL69; 2/16 AWP14;  
 Tobacco abuse     ICD-10-CM: Z72.0 ICD-9-CM: 305.1 Vitals BP Pulse Height(growth percentile) Weight(growth percentile) BMI OB Status 142/56 (!) 55 5' 5\" (1.651 m) 171 lb (77.6 kg) 28.46 kg/m2 Menopause Smoking Status Current Every Day Smoker Vitals History BMI and BSA Data Body Mass Index Body Surface Area  
 28.46 kg/m 2 1.89 m 2 Preferred Pharmacy Pharmacy Name Phone [address] [facility], [address] [address] Your Updated Medication List  
  
   
This list is accurate as of: 10/2/17 10:43 AM.  Always use your most recent med list.  
  
  
  
  
 acetaminophen 500 mg tablet Commonly known as:  TYLENOL Take  by mouth every six (6) hours as needed for Pain. aspirin 325 mg tablet Commonly known as:  ASPIRIN Take 325 mg by mouth daily. ezetimibe 10 mg tablet Commonly known as:  Kendrick Duane Take  by mouth.  
  
 metoprolol tartrate 100 mg IR tablet Commonly known as:  LOPRESSOR  
take 1 tablet by mouth twice a day NORVASC 10 mg tablet Generic drug:  amLODIPine Take 10 mg by mouth daily. rosuvastatin 40 mg tablet Commonly known as:  CRESTOR Take 1 Tab by mouth nightly. We Performed the Following [place] SMOKING AND TOBACCO USE CESSATION 3 - 10 MINUTES [91142 CPT(R)] SCHEDULE NUCLEAR STUDY [[id number] Custom] Comments:  
 pharmacologic Follow-up Instructions Return if symptoms worsen or fail to improve, for same day post test.  
  
  
Patient Instructions There are no discontinued medications. Orders Placed This Encounter  SCHEDULE NUCLEAR STUDY pharmacologic  [place] SMOKING AND TOBACCO USE CESSATION 3 - 10 MINUTES Stopping Smoking: Care Instructions Your Care Instructions Cigarette smokers crave the nicotine in cigarettes. Giving it up is much harder than simply changing a habit. Your body has to stop craving the nicotine. It is hard to quit, but you can do it. There are many tools that people use to quit smoking. You may find that combining tools works best for you. There are several steps to quitting. First you get ready to quit. Then you get support to help you. After that, you learn new skills and behaviors to become a nonsmoker. For many people, a necessary step is getting and using medicine. Your doctor will help you set up the plan that best meets your needs. You may want to attend a smoking cessation program to help you quit smoking. When you choose a program, look for one that has proven success. Ask your doctor for ideas. You will greatly increase your chances of success if you take medicine as well as get counseling or join a cessation program. 
Some of the changes you feel when you first quit tobacco are uncomfortable. Your body will miss the nicotine at first, and you may feel short-tempered and grumpy. You may have trouble sleeping or concentrating. Medicine can help you deal with these symptoms. You may struggle with changing your smoking habits and rituals. The last step is the tricky one: Be prepared for the smoking urge to continue for a time. This is a lot to deal with, but keep at it. You will feel better. Follow-up care is a key part of your treatment and safety. Be sure to make and go to all appointments, and call your doctor if you are having problems.  Its also a good idea to know your test results and keep a list of the medicines you take. How can you care for yourself at home? · Ask your family, friends, and coworkers for support. You have a better chance of quitting if you have help and support. · Join a support group, such as Nicotine Anonymous, for people who are trying to quit smoking. · Consider signing up for a smoking cessation program, such as the American Lung Association's Freedom from Smoking program. 
· Set a quit date. Pick your date carefully so that it is not right in the middle of a big deadline or stressful time. Once you quit, do not even take a puff. Get rid of all ashtrays and lighters after your last cigarette. Clean your house and your clothes so that they do not smell of smoke. · Learn how to be a nonsmoker. Think about ways you can avoid those things that make you reach for a cigarette. ¨ Avoid situations that put you at greatest risk for smoking. For some people, it is hard to have a drink with friends without smoking. For others, they might skip a coffee break with coworkers who smoke. ¨ Change your daily routine. Take a different route to work or eat a meal in a different place. · Cut down on stress. Calm yourself or release tension by doing an activity you enjoy, such as reading a book, taking a hot bath, or gardening. · Talk to your doctor or pharmacist about nicotine replacement therapy, which replaces the nicotine in your body. You still get nicotine but you do not use tobacco. Nicotine replacement products help you slowly reduce the amount of nicotine you need. These products come in several forms, many of them available over-the-counter: ¨ Nicotine patches ¨ Nicotine gum and lozenges ¨ Nicotine inhaler · Ask your doctor about bupropion (Wellbutrin) or varenicline (Chantix), which are prescription medicines. They do not contain nicotine. They help you by reducing withdrawal symptoms, such as stress and anxiety. · Some people find hypnosis, acupuncture, and massage helpful for ending the smoking habit. · Eat a healthy diet and get regular exercise. Having healthy habits will help your body move past its craving for nicotine. · Be prepared to keep trying. Most people are not successful the first few times they try to quit. Do not get mad at yourself if you smoke again. Make a list of things you learned and think about when you want to try again, such as next week, next month, or next year. Where can you learn more? Go to http://eric-randee.info/. Enter A507 in the search box to learn more about \"Stopping Smoking: Care Instructions. \" Current as of: March 20, 2017 Content Version: 11.3 © 1703-6842 [employer]. Care instructions adapted under license by [employer] (which disclaims liability or warranty for this information). If you have questions about a medical condition or this instruction, always ask your healthcare professional. [place] [address] any warranty or liability for your use of this information. Introducing [facility]! [employer] introduces CollabRx patient portal. Now you can access parts of your medical record, email your doctor's office, and request medication refills online. 1. In your internet browser, go to [email or web address] 2. Click on the First Time User? Click Here link in the Sign In box. You will see the New Member Sign Up page. 3. Enter your CollabRx Access Code exactly as it appears below. You will not need to use this code after youve completed the sign-up process. If you do not sign up before the expiration date, you must request a new code. · CollabRx Access Code: [email or web address] Expires: 12/31/2017  9:54 AM 
 
4. Enter the last four digits of your Social Security Number (xxxx) and Date of Birth (mm/dd/yyyy) as indicated and click Submit. You will be taken to the next sign-up page. 5. Create a Tapastreet ID. This will be your Tapastreet login ID and cannot be changed, so think of one that is secure and easy to remember. 6. Create a Tapastreet password. You can change your password at any time. 7. Enter your Password Reset Question and Answer. This can be used at a later time if you forget your password. 8. Enter your e-mail address. You will receive e-mail notification when new information is available in [address] 9. Click Sign Up. You can now view and download portions of your medical record. 10. Click the Download Summary menu link to download a portable copy of your medical information. If you have questions, please visit the Frequently Asked Questions section of the Tapastreet website. Remember, Tapastreet is NOT to be used for urgent needs. For medical emergencies, dial 911. Now available from your iPhone and Android! Please provide this summary of care documentation to your next provider. Your primary care clinician is listed as Deana Dunn. If you have any questions after today's visit, please call [phone].

## 2017-10-05 ENCOUNTER — OFFICE VISIT (OUTPATIENT)
Dept: CARDIOLOGY CLINIC | Age: 67
End: 2017-10-05

## 2017-10-05 ENCOUNTER — CLINICAL SUPPORT (OUTPATIENT)
Dept: CARDIOLOGY CLINIC | Age: 67
End: 2017-10-05

## 2017-10-05 VITALS
DIASTOLIC BLOOD PRESSURE: 68 MMHG | WEIGHT: 171 LBS | HEART RATE: 64 BPM | HEIGHT: 70 IN | BODY MASS INDEX: 24.48 KG/M2 | SYSTOLIC BLOOD PRESSURE: 148 MMHG

## 2017-10-05 DIAGNOSIS — Z95.810 ICD (IMPLANTABLE CARDIOVERTER-DEFIBRILLATOR) IN PLACE: ICD-10-CM

## 2017-10-05 DIAGNOSIS — Z01.810 PREOP CARDIOVASCULAR EXAM: ICD-10-CM

## 2017-10-05 DIAGNOSIS — I10 ESSENTIAL HYPERTENSION, BENIGN: ICD-10-CM

## 2017-10-05 DIAGNOSIS — Z95.1 POSTSURGICAL AORTOCORONARY BYPASS STATUS: ICD-10-CM

## 2017-10-05 DIAGNOSIS — E78.2 MIXED HYPERLIPIDEMIA: ICD-10-CM

## 2017-10-05 DIAGNOSIS — I25.10 ATHEROSCLEROSIS OF NATIVE CORONARY ARTERY OF NATIVE HEART WITHOUT ANGINA PECTORIS: Primary | ICD-10-CM

## 2017-10-05 NOTE — PROGRESS NOTES
[facility]  [address], [address] [place] [address], [place], [address] [place] [address]  [phone] [place]  [phone] [place]       Name: Rebecca Fallon         MRN#: [id number]        YOB: 1950   Gender: female Ht:5'5\" Wt:171 lbs       . Date of Rest/Stress Images: 10/5/2017   Referring Physician: Noemi Wong DO  Ordering Physician: Shonna Cai. Belem Herron MD, [facility] [place]  Technologist: Guerline Arnold. JOSH Olivarez., C.N.M.T  Diagnosis:  1. Atherosclerosis of native coronary artery of native heart without angina pectoris    2. Preop cardiovascular exam    3. Postsurgical aortocoronary bypass status          Rest/Stress Myoview SPECT Myocardial Perfusion Imaging with  Lexiscan Stress and gated SPECT Imaging      PROCEDURE:      Myocardial perfusion imaging was performed at rest approximately 30 mins following the intravenous injection,(Right hand ) of 11.8 mCi of Tc99m Myoview for evaluation of myocardial function and perfusion at rest.      Baseline:   Heart rate 49. /92. EKG shows sinus bradycardia normal axis no acute ST-T changes. Procedure Note:        0.4 mg of Lexiscan given intravenously over 15-20 seconds during sitting exercises followed by Myoview injection. Heart rate increased to 91,  blood pressure reduced to 154/72. No new ST-T changes are seen. Conculsion:   1. No ischemic ST-T changes after Lexiscan infusion. 2.  Appropriate heart rate and blood pressure response. 3.  No ischemic symptoms or arrhythmias seen. 4.  Perfusion images report to follow. Pharmacological:  Patient was injected with . 4 mg/mL with Lexiscan intravenously over a period 10 to 20 sec. After pharmacologic stress, the patient was injected intravenously with 36.1 mCi of Tc99m Myoview. Gating post stress tomographic imaging performed approximately 45 minutes post tracer injection.  The data was reconstructed in the short, horizontal long and vertical long axis views and tomographic slices were generated. NUCLEAR IMAGING:    Findings:  1. Stress images reveal moderately reduced Myoview uptake in the basal part of the inferior wall seen in short axis, vertical and horizontal long axis views. 2. Resting images appear to have redistribution in the inferobasal segment. 3. Gated images reveal normal wall motion. Overall ejection fraction is calculated at 73*%. Conclusion:  1. Mildly abnormal scan. 2. Evidence of mild ischemia in a small area of the basal part of the inferior wall indicating possibly one vessel coronary artery disease. 3. Normal wall motion and preserved ejection fraction at 73%. 4. This represents a low risk scan. Thank you for the referral.    E-signed and Interpreting Physician:    Roshan Cotton.  Doris Cintron MD, [facility] [place]     Date of interpretation: 10/5/2017  Date of final report: 10/5/2017

## 2017-10-05 NOTE — MR AVS SNAPSHOT
Visit Information Date & Time Provider Department Dept. Phone Encounter #  
 10/5/2017 12:00 PM Elijah Harrison MD [facility] [phone] [id number] Follow-up Instructions Return in about 6 months (around 4/5/2018), or if symptoms worsen or fail to improve. Your Appointments 12/6/2017  8:00 AM  
[facility] [place] Appt Note: Carelink Transmission/[place]/Wireless [address] [facility] [address]  
  
   
 [address] [place] [phone]  
  
    
 3/23/2018 11:15 AM  
PROCEDURE with Elijah Harrison MD  
[facility] [place] ([facility]) Appt Note: 1 year in office pacer check [address] [facility] [address]  
  
   
 [address] [place] [phone] Upcoming Health Maintenance Date Due Hepatitis C Screening 1950 DTaP/Tdap/Td series (1 - Tdap) 12/3/1969 BREAST CANCER SCRN MAMMOGRAM 12/3/1998 FOBT Q 1 YEAR AGE 50-75 12/3/1998 ZOSTER VACCINE AGE 60> 10/3/2008 GLAUCOMA SCREENING Q2Y 12/3/2013 OSTEOPOROSIS SCREENING (DEXA) 12/3/2013 Pneumococcal 65+ Low/Medium Risk (1 of 2 - PCV13) 12/3/2013 MEDICARE YEARLY EXAM 12/3/2013 INFLUENZA AGE 9 TO ADULT 8/1/2017 Allergies as of 10/5/2017  Review Complete On: 10/5/2017 By: Elijah Harrison MD  
  
 Severity Noted Reaction Type Reactions Losartan High 10/22/2013   Intolerance Angioedema Tongue swelling but not on lips Current Immunizations  Never Reviewed No immunizations on file. Not reviewed this visit You Were Diagnosed With   
  
 Codes Comments Atherosclerosis of native coronary artery of native heart without angina pectoris    -  Primary ICD-10-CM: I25.10 ICD-9-CM: 414.01 10/17; 9/17; 3/17; 8/16 stable Symptoms;   
 Postsurgical aortocoronary bypass status     ICD-10-CM: Z95.1 ICD-9-CM: V45.81 Essential hypertension, benign     ICD-10-CM: I10 
ICD-9-CM: 401.1 controlled but high before meds today Mixed hyperlipidemia     ICD-10-CM: E78.2 ICD-9-CM: 272.2 Controlled 5/17 LDL69; 2/16 JDS74;  
 ICD (implantable cardioverter-defibrillator) in place     ICD-10-CM: Z95.810 ICD-9-CM: V45.02 8/17 nl function, nl volume Preop cardiovascular exam     ICD-10-CM: Z01.810 ICD-9-CM: V72.81 Vascular surgery Cleared from cardiac view with the understanding that patient will have mild risk for this surgery, Vitals BP Pulse Height(growth percentile) Weight(growth percentile) BMI OB Status 148/68 64 5' 10\" (1.778 m) 171 lb (77.6 kg) 24.54 kg/m2 Menopause Smoking Status Current Every Day Smoker Vitals History BMI and BSA Data Body Mass Index Body Surface Area 24.54 kg/m 2 1.96 m 2 Preferred Pharmacy Pharmacy Name Phone [address] [facility], [address] [address] Your Updated Medication List  
  
   
This list is accurate as of: 10/5/17 12:40 PM.  Always use your most recent med list.  
  
  
  
  
 acetaminophen 500 mg tablet Commonly known as:  TYLENOL Take  by mouth every six (6) hours as needed for Pain. aspirin 325 mg tablet Commonly known as:  ASPIRIN Take 325 mg by mouth daily. ezetimibe 10 mg tablet Commonly known as:  Amado Lamprey Take  by mouth.  
  
 metoprolol tartrate 100 mg IR tablet Commonly known as:  LOPRESSOR  
take 1 tablet by mouth twice a day NORVASC 10 mg tablet Generic drug:  amLODIPine Take 10 mg by mouth daily. regadenoson 0.4 mg/5 mL Syrg injection Commonly known as:  LEXISCAN  
5 mL by IntraVENous route once for 1 dose. rosuvastatin 40 mg tablet Commonly known as:  CRESTOR Take 1 Tab by mouth nightly. Follow-up Instructions Return in about 6 months (around 4/5/2018), or if symptoms worsen or fail to improve. Patient Instructions There are no discontinued medications. No orders of the defined types were placed in this encounter. Learning About Coronary Artery Disease (CAD) What is coronary artery disease? Coronary artery disease (CAD) occurs when plaque builds up in the arteries that bring oxygen-rich blood to your heart. Plaque is a fatty substance made of cholesterol, calcium, and other substances in the blood. This process is called hardening of the arteries, or atherosclerosis. What happens when you have coronary artery disease? · Plaque may narrow the coronary arteries. Narrowed arteries cause poor blood flow. This can lead to angina symptoms such as chest pain or discomfort. If blood flow is completely blocked, you could have a heart attack. · You can slow CAD and reduce the risk of future problems by making changes in your lifestyle. These include quitting smoking and eating heart-healthy foods. · Treatments for CAD, along with changes in your lifestyle, can help you live a longer and healthier life. How can you prevent coronary artery disease? · Do not smoke. It may be the best thing you can do to prevent heart disease. If you need help quitting, talk to your doctor about stop-smoking programs and medicines. These can increase your chances of quitting for good. · Be active. Get at least 30 minutes of exercise on most days of the week. Walking is a good choice. You also may want to do other activities, such as running, swimming, cycling, or playing tennis or team sports. · Eat heart-healthy foods. Eat more fruits and vegetables and less foods that contain saturated and trans fats. Limit alcohol, sodium, and sweets. · Stay at a healthy weight. Lose weight if you need to. · Manage other health problems such as diabetes, high blood pressure, and high cholesterol. · Manage stress. Stress can hurt your heart. To keep stress low, talk about your problems and feelings. Don't keep your feelings hidden. · If you have talked about it with your doctor, take a low-dose aspirin every day. Aspirin can help certain people lower their risk of a heart attack or stroke. But taking aspirin isn't right for everyone, because it can cause serious bleeding. Do not start taking daily aspirin unless your doctor knows about it. How is coronary artery disease treated? · Your doctor will suggest that you make lifestyle changes. For example, your doctor may ask you to eat healthy foods, quit smoking, lose extra weight, and be more active. · You will have to take medicines. · Your doctor may suggest a procedure to open narrowed or blocked arteries. This is called angioplasty. Or your doctor may suggest using healthy blood vessels to create detours around narrowed or blocked arteries. This is called bypass surgery. Follow-up care is a key part of your treatment and safety. Be sure to make and go to all appointments, and call your doctor if you are having problems. It's also a good idea to know your test results and keep a list of the medicines you take. Where can you learn more? Go to http://eric-randee.info/. Enter ([phone] [phone] in the search box to learn more about \"Learning About Coronary Artery Disease (CAD). \" Current as of: December 28, 2016 Content Version: 11.3 © 1795-8429 L3. Care instructions adapted under license by [employer] (which disclaims liability or warranty for this information). If you have questions about a medical condition or this instruction, always ask your healthcare professional. [address] any warranty or liability for your use of this information. Introducing [facility]!    
 Holly Sharma introduces EverythingMe patient portal. Now you can access parts of your medical record, email your doctor's office, and request medication refills online. 1. In your internet browser, go to [email or web address] 2. Click on the First Time User? Click Here link in the Sign In box. You will see the New Member Sign Up page. 3. Enter your Gigzolo Access Code exactly as it appears below. You will not need to use this code after youve completed the sign-up process. If you do not sign up before the expiration date, you must request a new code. · Gigzolo Access Code: [email or web address] Expires: 12/31/2017  9:54 AM 
 
4. Enter the last four digits of your Social Security Number (xxxx) and Date of Birth (mm/dd/yyyy) as indicated and click Submit. You will be taken to the next sign-up page. 5. Create a Gigzolo ID. This will be your Gigzolo login ID and cannot be changed, so think of one that is secure and easy to remember. 6. Create a Gigzolo password. You can change your password at any time. 7. Enter your Password Reset Question and Answer. This can be used at a later time if you forget your password. 8. Enter your e-mail address. You will receive e-mail notification when new information is available in [address] 9. Click Sign Up. You can now view and download portions of your medical record. 10. Click the Download Summary menu link to download a portable copy of your medical information. If you have questions, please visit the Frequently Asked Questions section of the Gigzolo website. Remember, Gigzolo is NOT to be used for urgent needs. For medical emergencies, dial 911. Now available from your iPhone and Android! Please provide this summary of care documentation to your next provider. Your primary care clinician is listed as Deana Dunn. If you have any questions after today's visit, please call [phone].

## 2017-10-05 NOTE — LETTER
Rusty Filomena 1950 
 
10/5/2017 Dear Elier Carlson, José Miguel Frederick MD 
 
I had the pleasure of evaluating  Ms. Heavenly Maier in office today. Below are the relevant portions of my assessment and plan of care. ICD-10-CM ICD-9-CM 1. Atherosclerosis of native coronary artery of native heart without angina pectoris I25.10 414.01   
 10/17; 9/17; 3/17; 8/16 stable Symptoms; 2. Postsurgical aortocoronary bypass status Z95.1 V45.81   
3. Essential hypertension, benign I10 401.1   
 controlled but high before meds today 4. Mixed hyperlipidemia E78.2 272.2 Controlled 5/17 LDL69; 2/16 LDL81;  
5. ICD (implantable cardioverter-defibrillator) in place-DDD ICD Z95.810 V45.02   
 8/17 nl function, nl volume 6. Preop cardiovascular exam Z01.810 V72.81 Vascular surgery Cleared from cardiac view with the understanding that patient will have mild risk for this surgery,  
  
 
 
Current Outpatient Prescriptions Medication Sig Dispense Refill  metoprolol tartrate (LOPRESSOR) 100 mg IR tablet take 1 tablet by mouth twice a day 60 Tab 3  
 acetaminophen (TYLENOL) 500 mg tablet Take  by mouth every six (6) hours as needed for Pain.  ezetimibe (ZETIA) 10 mg tablet Take  by mouth.  rosuvastatin (CRESTOR) 40 mg tablet Take 1 Tab by mouth nightly. 30 Tab 4  
 amLODIPine (NORVASC) 10 mg tablet Take 10 mg by mouth daily.  aspirin (ASPIRIN) 325 mg tablet Take 325 mg by mouth daily.  regadenoson (LEXISCAN) 0.4 mg/5 mL syrg injection 5 mL by IntraVENous route once for 1 dose. 5 mL 0 No orders of the defined types were placed in this encounter. If you have questions, please do not hesitate to call me. I look forward to following Ms. Heavenly Maier along with you. Sincerely, Db Mercado MD

## 2017-10-05 NOTE — PROGRESS NOTES
HISTORY OF PRESENT ILLNESS  Lyndsay Floyd is a 74 y.o. female. HPI Comments: Seen preop for vasc surgery by Dr Selina Walter    Patient denies significant chest pain, SOB, palpitations, edema, dizziness      Cardiac Testing   The history is provided by the medical records and patient (Post nuclear stress test). Pertinent negatives include no chest pain, no headaches and no shortness of breath. Hypertension   The history is provided by the medical records. This is a chronic problem. Pertinent negatives include no chest pain, no headaches and no shortness of breath. Cholesterol Problem   The history is provided by the medical records. This is a chronic problem. Pertinent negatives include no chest pain, no headaches and no shortness of breath. Review of Systems   Constitutional: Negative for chills, fever, malaise/fatigue and weight loss. HENT: Negative for nosebleeds. Eyes: Negative for discharge. Respiratory: Negative for cough, shortness of breath and wheezing. Cardiovascular: Negative for chest pain, palpitations, orthopnea, claudication, leg swelling and PND. Gastrointestinal: Negative for diarrhea, nausea and vomiting. Genitourinary: Negative for dysuria and hematuria. Musculoskeletal: Negative for joint pain. Skin: Negative for rash. Neurological: Negative for dizziness, seizures, loss of consciousness and headaches. Endo/Heme/Allergies: Negative for polydipsia. Does not bruise/bleed easily. Psychiatric/Behavioral: Negative for depression and substance abuse. The patient does not have insomnia.       Allergies   Allergen Reactions    Losartan Angioedema     Tongue swelling but not on lips       Past Medical History:   Diagnosis Date    Arthritis     Automatic implantable cardiac defibrillator in situ     Coronary atherosclerosis of native coronary artery     CABG 10/02 with LIMA to LAD and SVG to RCA    Depression     Essential hypertension     Headache(784.0)     Other and unspecified hyperlipidemia     9/12 Low density lipoproteins are at goal, triglycerides are at goal, high density lipoproteins are at goal.    Postsurgical aortocoronary bypass status 2002    Presence of permanent cardiac pacemaker-DDD 11/6/2015    OFFICE CHECK : 8/14 nl function, +AHR rare(changed detection to over 150/mt); 11/15 nl function     Unspecified arthropathy, lower leg     S/P Left knee replacement. Had repeat surgery's for her left knee.  UTI (urinary tract infection)        Family History   Problem Relation Age of Onset    Arthritis-osteo Other      not specified    Hypertension Other      not specified    Sudden Death Neg Hx     Heart Attack Neg Hx        Social History   Substance Use Topics    Smoking status: Current Every Day Smoker     Packs/day: 0.05    Smokeless tobacco: Never Used      Comment: 9/17 1/2 cigar/day    Alcohol use No        Current Outpatient Prescriptions   Medication Sig    metoprolol tartrate (LOPRESSOR) 100 mg IR tablet take 1 tablet by mouth twice a day    acetaminophen (TYLENOL) 500 mg tablet Take  by mouth every six (6) hours as needed for Pain.  ezetimibe (ZETIA) 10 mg tablet Take  by mouth.  rosuvastatin (CRESTOR) 40 mg tablet Take 1 Tab by mouth nightly.  amLODIPine (NORVASC) 10 mg tablet Take 10 mg by mouth daily.  aspirin (ASPIRIN) 325 mg tablet Take 325 mg by mouth daily.  regadenoson (LEXISCAN) 0.4 mg/5 mL syrg injection 5 mL by IntraVENous route once for 1 dose. No current facility-administered medications for this visit. Past Surgical History:   Procedure Laterality Date    CARDIAC SURG PROCEDURE UNLIST  11/09    gen change-[facility]    HX CORONARY ARTERY BYPASS GRAFT  10/2002    HX ORTHOPAEDIC  [id number]    Reduced two component revision using the Sigma System. Lateral release. Suture anchor repair of medial collateral ligament.    [facility] ORTHOPAEDIC  [id number]    Left knee arthrofibrosis, status post significant scarring, status post left total knee replacement    VASCULAR SURGERY PROCEDURE UNLIST  2005    Aorto biffem. Dr. Lawyer Landau       Diagnostic Studies:  CARDIOLOGY STUDIES 2/13/2013 1/1/2013 1/1/2009 2/1/2008 10/1/2002   Myocardial Perfusion Scan Result - ant ischemia, nl EF - abn scan, fixed anterolateral wall defect with partially reversible defect, EF 70% -   Cardiac Cath Result patent LIMA, sev native CAD including LM, med Rx for now - - - -   Echocardiogram - Complete Result - - EF 65%, PAP 33 - -   Coronary Angiogram Result - - - - Bypass x's 2   Some recent data might be hidden       Visit Vitals    /68    Pulse 64    Ht 5' 10\" (1.778 m)    Wt 77.6 kg (171 lb)    BMI 24.54 kg/m2       Ms. Katharine Dickinson has a reminder for a \"due or due soon\" health maintenance. I have asked that she contact her primary care provider for follow-up on this health maintenance. Physical Exam   Constitutional: She is oriented to person, place, and time. She appears well-developed and well-nourished. No distress. kyphosis   HENT:   Head: Normocephalic and atraumatic. Eyes: Right eye exhibits no discharge. Left eye exhibits no discharge. No scleral icterus. Neck: Neck supple. No JVD present. Carotid bruit is not present. No thyromegaly present. Cardiovascular: Normal rate, regular rhythm, S1 normal, S2 normal, normal heart sounds and intact distal pulses. Exam reveals no gallop and no friction rub. No murmur heard. Pulmonary/Chest: Effort normal and breath sounds normal. She has no wheezes. She has no rales. Abdominal: Soft. She exhibits no mass. There is no tenderness. Musculoskeletal: She exhibits no edema. Cant put legs straight   Neurological: She is alert and oriented to person, place, and time. Skin: Skin is warm and dry. No rash noted. Psychiatric: She has a normal mood and affect.  Her behavior is normal.   wounds healing well left subclavicular area     ASSESSMENT and PLAN            Diagnoses and all orders for this visit:    1. Atherosclerosis of native coronary artery of native heart without angina pectoris  Comments:  10/17; 9/17; 3/17; 8/16 stable Symptoms;     2. Postsurgical aortocoronary bypass status    3. Essential hypertension, benign  Comments:  controlled but high before meds today    4. Mixed hyperlipidemia  Comments:  Controlled  5/17 LDL69; 2/16 LDL81;    5. ICD (implantable cardioverter-defibrillator) in place-DDD ICD  Comments:  8/17 nl function, nl volume      6. Preop cardiovascular exam  Comments:  Vascular surgery  Cleared from cardiac view with the understanding that patient will have mild risk for this surgery,           Pertinent laboratory and test data reviewed and discussed with patient. See patient instructions also for other medical advice given    There are no discontinued medications. Follow-up Disposition:  Return in about 6 months (around 4/5/2018), or if symptoms worsen or fail to improve.

## 2017-10-05 NOTE — PATIENT INSTRUCTIONS
There are no discontinued medications. No orders of the defined types were placed in this encounter. Learning About Coronary Artery Disease (CAD)  What is coronary artery disease? Coronary artery disease (CAD) occurs when plaque builds up in the arteries that bring oxygen-rich blood to your heart. Plaque is a fatty substance made of cholesterol, calcium, and other substances in the blood. This process is called hardening of the arteries, or atherosclerosis. What happens when you have coronary artery disease? · Plaque may narrow the coronary arteries. Narrowed arteries cause poor blood flow. This can lead to angina symptoms such as chest pain or discomfort. If blood flow is completely blocked, you could have a heart attack. · You can slow CAD and reduce the risk of future problems by making changes in your lifestyle. These include quitting smoking and eating heart-healthy foods. · Treatments for CAD, along with changes in your lifestyle, can help you live a longer and healthier life. How can you prevent coronary artery disease? · Do not smoke. It may be the best thing you can do to prevent heart disease. If you need help quitting, talk to your doctor about stop-smoking programs and medicines. These can increase your chances of quitting for good. · Be active. Get at least 30 minutes of exercise on most days of the week. Walking is a good choice. You also may want to do other activities, such as running, swimming, cycling, or playing tennis or team sports. · Eat heart-healthy foods. Eat more fruits and vegetables and less foods that contain saturated and trans fats. Limit alcohol, sodium, and sweets. · Stay at a healthy weight. Lose weight if you need to. · Manage other health problems such as diabetes, high blood pressure, and high cholesterol. · Manage stress. Stress can hurt your heart. To keep stress low, talk about your problems and feelings. Don't keep your feelings hidden.   · If you have talked about it with your doctor, take a low-dose aspirin every day. Aspirin can help certain people lower their risk of a heart attack or stroke. But taking aspirin isn't right for everyone, because it can cause serious bleeding. Do not start taking daily aspirin unless your doctor knows about it. How is coronary artery disease treated? · Your doctor will suggest that you make lifestyle changes. For example, your doctor may ask you to eat healthy foods, quit smoking, lose extra weight, and be more active. · You will have to take medicines. · Your doctor may suggest a procedure to open narrowed or blocked arteries. This is called angioplasty. Or your doctor may suggest using healthy blood vessels to create detours around narrowed or blocked arteries. This is called bypass surgery. Follow-up care is a key part of your treatment and safety. Be sure to make and go to all appointments, and call your doctor if you are having problems. It's also a good idea to know your test results and keep a list of the medicines you take. Where can you learn more? Go to http://eric-randee.info/. Enter [id number] in the search box to learn more about \"Learning About Coronary Artery Disease (CAD). \"  Current as of: December 28, 2016  Content Version: 11.3  © 7485-9669 [employer]. Care instructions adapted under license by [facility] [employer] [facility] (which disclaims liability or warranty for this information). If you have questions about a medical condition or this instruction, always ask your healthcare professional. [place] [address] any warranty or liability for your use of this information.

## 2017-10-05 NOTE — PROGRESS NOTES
1. Have you been to the ER, urgent care clinic since your last visit? Hospitalized since your last visit? No    2. Have you seen or consulted any other health care providers outside of the [address] [place] [address] since your last visit? Include any pap smears or colon screening. No    3. Since your last visit, have you had any of the following symptoms? No cardiac symptoms     4. Have you had any blood work, X-rays or cardiac testing? No    5. Where do you normally have your labs drawn? [facility]    6. Do you need any refills today?    No

## 2017-11-19 DIAGNOSIS — I10 ESSENTIAL HYPERTENSION, BENIGN: ICD-10-CM

## 2017-11-19 DIAGNOSIS — I25.10 ATHEROSCLEROSIS OF NATIVE CORONARY ARTERY OF NATIVE HEART WITHOUT ANGINA PECTORIS: ICD-10-CM

## 2017-11-20 RX ORDER — METOPROLOL TARTRATE 100 MG/1
TABLET ORAL
Qty: 60 TAB | Refills: 3 | Status: SHIPPED | OUTPATIENT
Start: 2017-11-20 | End: 2018-03-25 | Stop reason: SDUPTHER

## 2018-03-25 DIAGNOSIS — I10 ESSENTIAL HYPERTENSION, BENIGN: ICD-10-CM

## 2018-03-25 DIAGNOSIS — I25.10 ATHEROSCLEROSIS OF NATIVE CORONARY ARTERY OF NATIVE HEART WITHOUT ANGINA PECTORIS: ICD-10-CM

## 2018-03-26 RX ORDER — METOPROLOL TARTRATE 100 MG/1
TABLET ORAL
Qty: 60 TAB | Refills: 3 | Status: SHIPPED | OUTPATIENT
Start: 2018-03-26 | End: 2018-07-12 | Stop reason: SDUPTHER

## 2018-04-06 ENCOUNTER — CLINICAL SUPPORT (OUTPATIENT)
Dept: CARDIOLOGY CLINIC | Age: 68
End: 2018-04-06

## 2018-04-06 VITALS
BODY MASS INDEX: 24.91 KG/M2 | SYSTOLIC BLOOD PRESSURE: 134 MMHG | DIASTOLIC BLOOD PRESSURE: 60 MMHG | HEART RATE: 65 BPM | HEIGHT: 70 IN | WEIGHT: 174 LBS

## 2018-04-06 DIAGNOSIS — I73.9 PAD (PERIPHERAL ARTERY DISEASE) (HCC): ICD-10-CM

## 2018-04-06 DIAGNOSIS — Z95.810 ICD (IMPLANTABLE CARDIOVERTER-DEFIBRILLATOR) IN PLACE: ICD-10-CM

## 2018-04-06 DIAGNOSIS — E78.2 MIXED HYPERLIPIDEMIA: ICD-10-CM

## 2018-04-06 DIAGNOSIS — I10 ESSENTIAL HYPERTENSION, BENIGN: Primary | ICD-10-CM

## 2018-04-06 DIAGNOSIS — I25.10 ATHEROSCLEROSIS OF NATIVE CORONARY ARTERY OF NATIVE HEART WITHOUT ANGINA PECTORIS: ICD-10-CM

## 2018-04-06 DIAGNOSIS — I48.0 PAROXYSMAL ATRIAL FIBRILLATION (HCC): ICD-10-CM

## 2018-04-06 RX ORDER — CLOPIDOGREL BISULFATE 75 MG/1
TABLET ORAL
COMMUNITY
End: 2018-04-06

## 2018-04-06 NOTE — PATIENT INSTRUCTIONS
Medications Discontinued During This Encounter   Medication Reason    clopidogrel (PLAVIX) 75 mg tab Other    aspirin (ASPIRIN) 325 mg tablet Other    apixaban (ELIQUIS) 5 mg tablet Reorder          Atrial Fibrillation: Care Instructions  Your Care Instructions    Atrial fibrillation is an irregular and often fast heartbeat. Treating this condition is important for several reasons. It can cause blood clots, which can travel from your heart to your brain and cause a stroke. If you have a fast heartbeat, you may feel lightheaded, dizzy, and weak. An irregular heartbeat can also increase your risk for heart failure. Atrial fibrillation is often the result of another heart condition, such as high blood pressure or coronary artery disease. Making changes to improve your heart condition will help you stay healthy and active. Follow-up care is a key part of your treatment and safety. Be sure to make and go to all appointments, and call your doctor if you are having problems. It's also a good idea to know your test results and keep a list of the medicines you take. How can you care for yourself at home? Medicines  ? · Take your medicines exactly as prescribed. Call your doctor if you think you are having a problem with your medicine. You will get more details on the specific medicines your doctor prescribes. ? · If your doctor has given you a blood thinner to prevent a stroke, be sure you get instructions about how to take your medicine safely. Blood thinners can cause serious bleeding problems. ? · Do not take any vitamins, over-the-counter drugs, or herbal products without talking to your doctor first.   ? Lifestyle changes  ? · Do not smoke. Smoking can increase your chance of a stroke and heart attack. If you need help quitting, talk to your doctor about stop-smoking programs and medicines. These can increase your chances of quitting for good. ? · Eat a heart-healthy diet. ? · Stay at a healthy weight. Lose weight if you need to.   ? · Limit alcohol to 2 drinks a day for men and 1 drink a day for women. Too much alcohol can cause health problems. ? · Avoid colds and flu. Get a pneumococcal vaccine shot. If you have had one before, ask your doctor whether you need another dose. Get a flu shot every year. If you must be around people with colds or flu, wash your hands often. Activity  ? · If your doctor recommends it, get more exercise. Walking is a good choice. Bit by bit, increase the amount you walk every day. Try for at least 30 minutes on most days of the week. You also may want to swim, bike, or do other activities. Your doctor may suggest that you join a cardiac rehabilitation program so that you can have help increasing your physical activity safely. ? · Start light exercise if your doctor says it is okay. Even a small amount will help you get stronger, have more energy, and manage stress. Walking is an easy way to get exercise. Start out by walking a little more than you did in the hospital. Gradually increase the amount you walk. ? · When you exercise, watch for signs that your heart is working too hard. You are pushing too hard if you cannot talk while you are exercising. If you become short of breath or dizzy or have chest pain, sit down and rest immediately. ? · Check your pulse regularly. Place two fingers on the artery at the palm side of your wrist, in line with your thumb. If your heartbeat seems uneven or fast, talk to your doctor. When should you call for help? Call 911 anytime you think you may need emergency care. For example, call if:  ? · You have symptoms of a heart attack. These may include:  ¨ Chest pain or pressure, or a strange feeling in the chest.  ¨ Sweating. ¨ Shortness of breath. ¨ Nausea or vomiting. ¨ Pain, pressure, or a strange feeling in the back, neck, jaw, or upper belly or in one or both shoulders or arms. ¨ Lightheadedness or sudden weakness.   ¨ A fast or irregular heartbeat. After you call 911, the  may tell you to chew 1 adult-strength or 2 to 4 low-dose aspirin. Wait for an ambulance. Do not try to drive yourself. ? · You have symptoms of a stroke. These may include:  ¨ Sudden numbness, tingling, weakness, or loss of movement in your face, arm, or leg, especially on only one side of your body. ¨ Sudden vision changes. ¨ Sudden trouble speaking. ¨ Sudden confusion or trouble understanding simple statements. ¨ Sudden problems with walking or balance. ¨ A sudden, severe headache that is different from past headaches. ? · You passed out (lost consciousness). ?Call your doctor now or seek immediate medical care if:  ? · You have new or increased shortness of breath. ? · You feel dizzy or lightheaded, or you feel like you may faint. ? · Your heart rate becomes irregular. ? · You can feel your heart flutter in your chest or skip heartbeats. Tell your doctor if these symptoms are new or worse. ? Watch closely for changes in your health, and be sure to contact your doctor if you have any problems. Where can you learn more? Go to http://eric-randee.info/. Enter U020 in the search box to learn more about \"Atrial Fibrillation: Care Instructions. \"  Current as of: September 21, 2016  Content Version: 11.4  © 5159-3658 [employer]. Care instructions adapted under license by [employer] (which disclaims liability or warranty for this information). If you have questions about a medical condition or this instruction, always ask your healthcare professional. [place] [address] any warranty or liability for your use of this information.

## 2018-04-06 NOTE — PROGRESS NOTES
1. Have you been to the ER, urgent care clinic since your last visit? Hospitalized since your last visit? No    2. Have you seen or consulted any other health care providers outside of the [address] [place] [address] since your last visit? Include any pap smears or colon screening. Yes Where: Dr Roel Stevens     3. Since your last visit, have you had any of the following symptoms? .          4. Have you had any blood work, X-rays or cardiac testing? Yes Where: Dr Biju Johnston Reason for visit: Labs           5. Where do you normally have your labs drawn? PCP Office/[facility]    6. Do you need any refills today?    No

## 2018-04-06 NOTE — MR AVS SNAPSHOT
Teresa Jeffk 
 
 
 [address] [facility] [address] 
[phone] Patient: Lai Webber MRN: [id number] OYD:08/0/1977 Visit Information Date & Time Provider Department Dept. Phone Encounter #  
 4/6/2018 11:45 AM Andrew Yoo MD [facility] [id number] Follow-up Instructions Return in about 3 months (around 7/6/2018). Your Appointments 7/11/2018  8:15 AM  
[facility] ([facility]) Appt Note: [facility] Transmission/[place]/Wireless [address] [facility] [address]  
  
   
 [address] [address] [place] [address]  
  
    
 7/26/2018 11:00 AM  
Office Visit with Andrew Yoo MD  
[facility] ([facility]) Appt Note: 3 month follow up/HGB/HCT  
 [address] [facility] [address]  
  
   
 [address] [facility] [address]  
  
    
 10/17/2018  8:15 AM  
[facility] ([address]) Appt Note: [facility]/Wireless [address] [facility] [address]  
[phone]  
  
    
 1/23/2019  8:15 AM  
[facility] ([address]) Appt Note: [facility]/[place]/Wireless [address] [facility]  
[phone] Upcoming Health Maintenance Date Due Hepatitis C Screening 1950 DTaP/Tdap/Td series (1 - Tdap) 12/3/1969 BREAST CANCER SCRN MAMMOGRAM 12/3/1998 FOBT Q 1 YEAR AGE 50-75 12/3/1998 ZOSTER VACCINE AGE 60> 10/3/2008 GLAUCOMA SCREENING Q2Y 12/3/2013 Bone Densitometry (Dexa) Screening 12/3/2013 Pneumococcal 65+ Low/Medium Risk (1 of 2 - PCV13) 12/3/2013 MEDICARE YEARLY EXAM 3/14/2018 Allergies as of 4/6/2018  Review Complete On: 4/6/2018 By: Ines Doctor, NP Severity Noted Reaction Type Reactions Losartan High 10/22/2013   Intolerance Angioedema Tongue swelling but not on lips Current Immunizations  Never Reviewed No immunizations on file. Not reviewed this visit You Were Diagnosed With   
  
 Codes Comments Essential hypertension, benign    -  Primary ICD-10-CM: I10 
ICD-9-CM: 401.1 Controlled ICD (implantable cardioverter-defibrillator) in place     ICD-10-CM: Z95.810 ICD-9-CM: V45.02  4/18 nl function, frequent AHR(likely AFlutter), nl volume Paroxysmal atrial fibrillation (HCC)     ICD-10-CM: I48.0 ICD-9-CM: 427.31 197 AT/AF episodes seen in ICD interrogation D/C aspirin and plavix - begin Eliquis 5 mg / BID Begin Eliquis 5 mg / BID Mixed hyperlipidemia     ICD-10-CM: E78.2 ICD-9-CM: 272.2 Labs drawn by PCP  
5/17 LDL69; 2/16 FFO91; 10/15, 9/14 Low density lipoproteins are at goal, triglycerides are at goal, high density lipoproteins are at goal.  
 Atherosclerosis of native coronary artery of native heart without angina pectoris     ICD-10-CM: I25.10 ICD-9-CM: 414.01 10/17; 9/17; 3/17; 8/16 stable Symptoms; CABG 10/02 with LIMA to LAD and SVG to RCA Vitals BP Pulse Height(growth percentile) Weight(growth percentile) BMI OB Status 134/60 65 5' 10\" (1.778 m) 174 lb (78.9 kg) 24.97 kg/m2 Menopause Smoking Status Light Tobacco Smoker Vitals History BMI and BSA Data Body Mass Index Body Surface Area 24.97 kg/m 2 1.97 m 2 Preferred Pharmacy Pharmacy Name Phone [address] [facility], [address] Your Updated Medication List  
  
   
This list is accurate as of 4/6/18  1:17 PM.  Always use your most recent med list.  
  
  
  
  
 acetaminophen 500 mg tablet Commonly known as:  TYLENOL  
 Take  by mouth every six (6) hours as needed for Pain. apixaban 5 mg tablet Commonly known as:  Morganjen Bell Take 1 Tab by mouth two (2) times a day.  
  
 ezetimibe 10 mg tablet Commonly known as:  Arleta Me Take  by mouth.  
  
 metoprolol tartrate 100 mg IR tablet Commonly known as:  LOPRESSOR  
take 1 tablet by mouth twice a day NORVASC 10 mg tablet Generic drug:  amLODIPine Take 10 mg by mouth daily. rosuvastatin 40 mg tablet Commonly known as:  CRESTOR Take 1 Tab by mouth nightly. Prescriptions Sent to Pharmacy Refills  
 apixaban (ELIQUIS) 5 mg tablet 3 Sig: Take 1 Tab by mouth two (2) times a day. Class: Normal  
 Pharmacy: [id number] [facility], [address] [facility] #: [phone] Route: Oral  
  
Follow-up Instructions Return in about 3 months (around 7/6/2018). To-Do List   
 07/07/2018 Lab:  HGB & HCT Patient Instructions Atrial Fibrillation: Care Instructions Your Care Instructions Atrial fibrillation is an irregular and often fast heartbeat. Treating this condition is important for several reasons. It can cause blood clots, which can travel from your heart to your brain and cause a stroke. If you have a fast heartbeat, you may feel lightheaded, dizzy, and weak. An irregular heartbeat can also increase your risk for heart failure. Atrial fibrillation is often the result of another heart condition, such as high blood pressure or coronary artery disease. Making changes to improve your heart condition will help you stay healthy and active. Follow-up care is a key part of your treatment and safety. Be sure to make and go to all appointments, and call your doctor if you are having problems. It's also a good idea to know your test results and keep a list of the medicines you take. How can you care for yourself at home? Medicines ? · Take your medicines exactly as prescribed. Call your doctor if you think you are having a problem with your medicine. You will get more details on the specific medicines your doctor prescribes. ? · If your doctor has given you a blood thinner to prevent a stroke, be sure you get instructions about how to take your medicine safely. Blood thinners can cause serious bleeding problems. ? · Do not take any vitamins, over-the-counter drugs, or herbal products without talking to your doctor first. ? Lifestyle changes ? · Do not smoke. Smoking can increase your chance of a stroke and heart attack. If you need help quitting, talk to your doctor about stop-smoking programs and medicines. These can increase your chances of quitting for good. ? · Eat a heart-healthy diet. ? · Stay at a healthy weight. Lose weight if you need to.  
? · Limit alcohol to 2 drinks a day for men and 1 drink a day for women. Too much alcohol can cause health problems. ? · Avoid colds and flu. Get a pneumococcal vaccine shot. If you have had one before, ask your doctor whether you need another dose. Get a flu shot every year. If you must be around people with colds or flu, wash your hands often. Activity ? · If your doctor recommends it, get more exercise. Walking is a good choice. Bit by bit, increase the amount you walk every day. Try for at least 30 minutes on most days of the week. You also may want to swim, bike, or do other activities. Your doctor may suggest that you join a cardiac rehabilitation program so that you can have help increasing your physical activity safely. ? · Start light exercise if your doctor says it is okay. Even a small amount will help you get stronger, have more energy, and manage stress. Walking is an easy way to get exercise. Start out by walking a little more than you did in the hospital. Gradually increase the amount you walk.   
? · When you exercise, watch for signs that your heart is working too hard. You are pushing too hard if you cannot talk while you are exercising. If you become short of breath or dizzy or have chest pain, sit down and rest immediately. ? · Check your pulse regularly. Place two fingers on the artery at the palm side of your wrist, in line with your thumb. If your heartbeat seems uneven or fast, talk to your doctor. When should you call for help? Call 911 anytime you think you may need emergency care. For example, call if: 
? · You have symptoms of a heart attack. These may include: ¨ Chest pain or pressure, or a strange feeling in the chest. 
¨ Sweating. ¨ Shortness of breath. ¨ Nausea or vomiting. ¨ Pain, pressure, or a strange feeling in the back, neck, jaw, or upper belly or in one or both shoulders or arms. ¨ Lightheadedness or sudden weakness. ¨ A fast or irregular heartbeat. After you call 911, the  may tell you to chew 1 adult-strength or 2 to 4 low-dose aspirin. Wait for an ambulance. Do not try to drive yourself. ? · You have symptoms of a stroke. These may include: 
¨ Sudden numbness, tingling, weakness, or loss of movement in your face, arm, or leg, especially on only one side of your body. ¨ Sudden vision changes. ¨ Sudden trouble speaking. ¨ Sudden confusion or trouble understanding simple statements. ¨ Sudden problems with walking or balance. ¨ A sudden, severe headache that is different from past headaches. ? · You passed out (lost consciousness). ?Call your doctor now or seek immediate medical care if: 
? · You have new or increased shortness of breath. ? · You feel dizzy or lightheaded, or you feel like you may faint. ? · Your heart rate becomes irregular. ? · You can feel your heart flutter in your chest or skip heartbeats. Tell your doctor if these symptoms are new or worse. ? Watch closely for changes in your health, and be sure to contact your doctor if you have any problems. Where can you learn more? Go to http://eric-randee.info/. Enter U020 in the search box to learn more about \"Atrial Fibrillation: Care Instructions. \" Current as of: September 21, 2016 Content Version: 11.4 © 0732-6411 [employer]. Care instructions adapted under license by [employer] (which disclaims liability or warranty for this information). If you have questions about a medical condition or this instruction, always ask your healthcare professional. [place] [address] any warranty or liability for your use of this information. Introducing [facility]! [employer] introduces [employer] patient portal. Now you can access parts of your medical record, email your doctor's office, and request medication refills online. 1. In your internet browser, go to [email or web address] 2. Click on the First Time User? Click Here link in the Sign In box. You will see the New Member Sign Up page. 3. Enter your [employer] Access Code exactly as it appears below. You will not need to use this code after youve completed the sign-up process. If you do not sign up before the expiration date, you must request a new code. · [employer] Access Code: [email or web address] Expires: 7/5/2018 12:16 PM 
 
4. Enter the last four digits of your Social Security Number (xxxx) and Date of Birth (mm/dd/yyyy) as indicated and click Submit. You will be taken to the next sign-up page. 5. Create a [employer] ID. This will be your [employer] login ID and cannot be changed, so think of one that is secure and easy to remember. 6. Create a [employer] password. You can change your password at any time. 7. Enter your Password Reset Question and Answer. This can be used at a later time if you forget your password. 8. Enter your e-mail address. You will receive e-mail notification when new information is available in [address] 9. Click Sign Up.  You can now view and download portions of your medical record. 10. Click the Download Summary menu link to download a portable copy of your medical information. If you have questions, please visit the Frequently Asked Questions section of the [employer] website. Remember, [employer] is NOT to be used for urgent needs. For medical emergencies, dial 911. Now available from your iPhone and Android! Please provide this summary of care documentation to your next provider. Your primary care clinician is listed as Deana Dunn. If you have any questions after today's visit, please call [phone].

## 2018-04-06 NOTE — PROGRESS NOTES
HISTORY OF PRESENT ILLNESS  Barbara Mc is a 69 y.o. female. HPI Comments: Seen preop for vasc surgery by Dr Joyce Giang    Patient denies significant chest pain, SOB, palpitations, edema, dizziness      Cholesterol Problem   The history is provided by the medical records. This is a chronic problem. Pertinent negatives include no chest pain, no headaches and no shortness of breath. Hypertension   The history is provided by the medical records. This is a chronic problem. Pertinent negatives include no chest pain, no headaches and no shortness of breath. Pacemaker Check   Pertinent negatives include no chest pain, no headaches and no shortness of breath. Cardiac Testing   The history is provided by the medical records and patient (Post nuclear stress test). Pertinent negatives include no chest pain, no headaches and no shortness of breath. Irregular Heart Beat    Pertinent negatives include no fever, no malaise/fatigue, no chest pain, no claudication, no orthopnea, no PND, no nausea, no vomiting, no headaches, no dizziness, no cough and no shortness of breath. Her past medical history is significant for hypertension. Review of Systems   Constitutional: Negative for chills, fever, malaise/fatigue and weight loss. HENT: Negative for nosebleeds. Eyes: Negative for discharge. Respiratory: Negative for cough, shortness of breath and wheezing. Cardiovascular: Negative for chest pain, palpitations, orthopnea, claudication, leg swelling and PND. Gastrointestinal: Negative for diarrhea, nausea and vomiting. Genitourinary: Negative for dysuria and hematuria. Musculoskeletal: Negative for joint pain. Skin: Negative for rash. Neurological: Negative for dizziness, seizures, loss of consciousness and headaches. Endo/Heme/Allergies: Negative for polydipsia. Does not bruise/bleed easily. Psychiatric/Behavioral: Negative for depression and substance abuse. The patient does not have insomnia. Allergies   Allergen Reactions    Losartan Angioedema     Tongue swelling but not on lips       Past Medical History:   Diagnosis Date    Arthritis     Automatic implantable cardiac defibrillator in situ     Coronary atherosclerosis of native coronary artery     CABG 10/02 with LIMA to LAD and SVG to RCA    Depression     Essential hypertension     Headache(784.0)     Other and unspecified hyperlipidemia     9/12 Low density lipoproteins are at goal, triglycerides are at goal, high density lipoproteins are at goal.    Postsurgical aortocoronary bypass status 2002    Presence of permanent cardiac pacemaker-DDD 11/6/2015    OFFICE CHECK : 8/14 nl function, +AHR rare(changed detection to over 150/mt); 11/15 nl function     Unspecified arthropathy, lower leg     S/P Left knee replacement. Had repeat surgery's for her left knee.  UTI (urinary tract infection)        Family History   Problem Relation Age of Onset    Arthritis-osteo Other      not specified    Hypertension Other      not specified    Sudden Death Neg Hx     Heart Attack Neg Hx        Social History   Substance Use Topics    Smoking status: Light Tobacco Smoker     Packs/day: 0.05    Smokeless tobacco: Never Used      Comment: 4/18 one cigarette a day    Alcohol use No        Current Outpatient Prescriptions   Medication Sig    apixaban (ELIQUIS) 5 mg tablet Take 1 Tab by mouth two (2) times a day.  metoprolol tartrate (LOPRESSOR) 100 mg IR tablet take 1 tablet by mouth twice a day    acetaminophen (TYLENOL) 500 mg tablet Take  by mouth every six (6) hours as needed for Pain.  ezetimibe (ZETIA) 10 mg tablet Take  by mouth.  rosuvastatin (CRESTOR) 40 mg tablet Take 1 Tab by mouth nightly.  amLODIPine (NORVASC) 10 mg tablet Take 10 mg by mouth daily. No current facility-administered medications for this visit.          Past Surgical History:   Procedure Laterality Date    CARDIAC SURG PROCEDURE UNLIST  11/09    gen change-[facility]    HX CORONARY ARTERY BYPASS GRAFT  10/2002    HX ORTHOPAEDIC  [id number]    Reduced two component revision using the Sigma System. Lateral release. Suture anchor repair of medial collateral ligament. Wing Josephine  [id number]    Left knee arthrofibrosis, status post significant scarring, status post left total knee replacement    VASCULAR SURGERY PROCEDURE UNLIST  2005    Aorto biffem. Dr. Jack Cuellar       Diagnostic Studies:  CARDIOLOGY STUDIES 10/5/2017 2/13/2013 1/1/2013 1/1/2009 2/1/2008 10/1/2002   Myocardial Perfusion Scan Result - - ant ischemia, nl EF - abn scan, fixed anterolateral wall defect with partially reversible defect, EF 70% -   Cardiac Cath Result - patent LIMA, sev native CAD including LM, med Rx for now - - - -   Pharmacological Nuclear Stress Test Result mild small infbasal ischemia, 73%EF - - - - -   Echocardiogram - Complete Result - - - EF 65%, PAP 33 - -   Coronary Angiogram Result - - - - - Bypass x's 2   Some recent data might be hidden       Visit Vitals    /60    Pulse 65    Ht 5' 10\" (1.778 m)    Wt 78.9 kg (174 lb)    BMI 24.97 kg/m2       Ms. Maged Alonso has a reminder for a \"due or due soon\" health maintenance. I have asked that she contact her primary care provider for follow-up on this health maintenance. Physical Exam   Constitutional: She is oriented to person, place, and time. She appears well-developed and well-nourished. No distress. kyphosis   HENT:   Head: Normocephalic and atraumatic. Eyes: Right eye exhibits no discharge. Left eye exhibits no discharge. No scleral icterus. Neck: Neck supple. No JVD present. Carotid bruit is not present. No thyromegaly present. Cardiovascular: Normal rate, regular rhythm, S1 normal, S2 normal, normal heart sounds and intact distal pulses. Exam reveals no gallop and no friction rub. No murmur heard. Pulmonary/Chest: Effort normal and breath sounds normal. She has no wheezes. She has no rales.    Abdominal: Soft. She exhibits no mass. There is no tenderness. Musculoskeletal: She exhibits no edema. Cant put legs straight   Neurological: She is alert and oriented to person, place, and time. Skin: Skin is warm and dry. No rash noted. Psychiatric: She has a normal mood and affect. Her behavior is normal.   wounds healing well left subclavicular area     ASSESSMENT and PLAN    Diagnoses and all orders for this visit:    1. Essential hypertension, benign  Comments:  Controlled    2. ICD (implantable cardioverter-defibrillator) in place-DDD ICD  Comments:   4/18 nl function, frequent AHR(likely AFlutter), nl volume    3. Paroxysmal atrial fibrillation (HCC)  Comments:  197 AT/AF episodes seen in ICD interrogation - asymptomatic   Begin Eliquis 5 mg / PO BID; H & H in 3 months   D/C aspirin and plavix(was for chr PAD)  4. Mixed hyperlipidemia  Comments:  Labs drawn by PCP - request copy of labs  5/17 LDL69; 2/16 LDL81; 10/15, 9/14 Low density lipoproteins are at goal, triglycerides are at goal, high density lipoproteins are at goal.    5. Atherosclerosis of native coronary artery of native heart without angina pectoris  Comments:  10/17; 9/17; 3/17; 8/16 stable Symptoms; CABG 10/02 with LIMA to LAD and SVG to RCA    Discussed with patient the risk of bleeding on anticoagulation. Patient understands. Patient also understands the reduced risk of stroke with anticoagulation due to atrial fibrillation. Pertinent laboratory and test data reviewed and discussed with patient. See patient instructions also for other medical advice given    Medications Discontinued During This Encounter   Medication Reason    clopidogrel (PLAVIX) 75 mg tab Other    aspirin (ASPIRIN) 325 mg tablet Other       Follow-up Disposition:  Return in about 3 months (around 7/6/2018).

## 2018-04-06 NOTE — LETTER
Eliel Lenard 1950 
 
4/6/2018 Dear Brenda Merino, Heather Mckeon MD 
 
I had the pleasure of evaluating  Ms. Cary De Paz in office today. Below are the relevant portions of my assessment and plan of care. ICD-10-CM ICD-9-CM 1. Essential hypertension, benign I10 401.1 Controlled 2. ICD (implantable cardioverter-defibrillator) in place-DDD ICD Z95.810 V45.02   
  4/18 nl function, frequent AHR(likely AFlutter), nl volume 3. Paroxysmal atrial fibrillation (HCC) I48.0 427.31 HGB & HCT  
 197 AT/AF episodes seen in ICD interrogation D/C aspirin and plavix - begin Eliquis 5 mg / BID Begin Eliquis 5 mg / BID 4. Mixed hyperlipidemia E78.2 272.2 Labs drawn by PCP  
5/17 LDL69; 2/16 PNQ44; 10/15, 9/14 Low density lipoproteins are at goal, triglycerides are at goal, high density lipoproteins are at goal.  
5. Atherosclerosis of native coronary artery of native heart without angina pectoris I25.10 414.01   
 10/17; 9/17; 3/17; 8/16 stable Symptoms; CABG 10/02 with LIMA to LAD and SVG to RCA Current Outpatient Prescriptions Medication Sig Dispense Refill  apixaban (ELIQUIS) 5 mg tablet Take 1 Tab by mouth two (2) times a day. 60 Tab 3  
 metoprolol tartrate (LOPRESSOR) 100 mg IR tablet take 1 tablet by mouth twice a day 60 Tab 3  
 acetaminophen (TYLENOL) 500 mg tablet Take  by mouth every six (6) hours as needed for Pain.  ezetimibe (ZETIA) 10 mg tablet Take  by mouth.  rosuvastatin (CRESTOR) 40 mg tablet Take 1 Tab by mouth nightly. 30 Tab 4  
 amLODIPine (NORVASC) 10 mg tablet Take 10 mg by mouth daily. Orders Placed This Encounter  HGB & HCT Standing Status:   Future Standing Expiration Date:   4/7/2019  DISCONTD: clopidogrel (PLAVIX) 75 mg tab Sig: Take  by mouth.  apixaban (ELIQUIS) 5 mg tablet Sig: Take 1 Tab by mouth two (2) times a day. Dispense:  60 Tab Refill:  3 If you have questions, please do not hesitate to call me. I look forward to following Ms. Abril Hernandez along with you. Sincerely, Johnny Baker MD

## 2018-04-06 NOTE — ASSESSMENT & PLAN NOTE
Key CAD CHF Meds             apixaban (ELIQUIS) 5 mg tablet  (Taking) Take 1 Tab by mouth two (2) times a day. metoprolol tartrate (LOPRESSOR) 100 mg IR tablet  (Taking) take 1 tablet by mouth twice a day    ezetimibe (ZETIA) 10 mg tablet  (Taking) Take  by mouth. rosuvastatin (CRESTOR) 40 mg tablet  (Taking) Take 1 Tab by mouth nightly. amLODIPine (NORVASC) 10 mg tablet  (Taking) Take 10 mg by mouth daily.           [id number](BNP,BNPP,BNPPPOC,HSCRP,NA,NAPOC,K,KPOCT,CHOL,CHOLPOCT,HDL,HDLPOC,LDLCHOL,LDLPOCT,LDLCPOC,LDLC,LDL,LDLCEXT,TRIGL,TGLPOCT,INR,INREXT,INRPOC,PTP,PTINR,PTEXT,DIG)@

## 2018-07-12 DIAGNOSIS — I10 ESSENTIAL HYPERTENSION, BENIGN: ICD-10-CM

## 2018-07-12 DIAGNOSIS — I25.10 ATHEROSCLEROSIS OF NATIVE CORONARY ARTERY OF NATIVE HEART WITHOUT ANGINA PECTORIS: ICD-10-CM

## 2018-07-12 RX ORDER — METOPROLOL TARTRATE 100 MG/1
TABLET ORAL
Qty: 60 TAB | Refills: 3 | Status: SHIPPED | OUTPATIENT
Start: 2018-07-12 | End: 2018-10-24 | Stop reason: SDUPTHER

## 2018-07-27 ENCOUNTER — OFFICE VISIT (OUTPATIENT)
Dept: CARDIOLOGY CLINIC | Age: 68
End: 2018-07-27

## 2018-07-27 VITALS
HEART RATE: 60 BPM | DIASTOLIC BLOOD PRESSURE: 60 MMHG | SYSTOLIC BLOOD PRESSURE: 144 MMHG | WEIGHT: 179 LBS | HEIGHT: 70 IN | BODY MASS INDEX: 25.62 KG/M2

## 2018-07-27 DIAGNOSIS — I48.0 PAROXYSMAL ATRIAL FIBRILLATION (HCC): ICD-10-CM

## 2018-07-27 DIAGNOSIS — Z95.810 ICD (IMPLANTABLE CARDIOVERTER-DEFIBRILLATOR) IN PLACE: ICD-10-CM

## 2018-07-27 DIAGNOSIS — I10 ESSENTIAL HYPERTENSION, BENIGN: ICD-10-CM

## 2018-07-27 DIAGNOSIS — Z95.1 POSTSURGICAL AORTOCORONARY BYPASS STATUS: ICD-10-CM

## 2018-07-27 DIAGNOSIS — E78.2 MIXED HYPERLIPIDEMIA: ICD-10-CM

## 2018-07-27 DIAGNOSIS — I25.10 ATHEROSCLEROSIS OF NATIVE CORONARY ARTERY OF NATIVE HEART WITHOUT ANGINA PECTORIS: Primary | ICD-10-CM

## 2018-07-27 RX ORDER — HYDROCHLOROTHIAZIDE 12.5 MG/1
12.5 TABLET ORAL DAILY
Qty: 30 TAB | Refills: 3 | Status: SHIPPED | OUTPATIENT
Start: 2018-07-27 | End: 2018-11-12 | Stop reason: SDUPTHER

## 2018-07-27 NOTE — PROGRESS NOTES
1. Have you been to the ER, urgent care clinic since your last visit? Hospitalized since your last visit? No 
 
2. Have you seen or consulted any other health care providers outside of the [address] [place] [address] since your last visit? Include any pap smears or colon screening. Yes Where: PCP 3. Since your last visit, have you had any of the following symptoms? .  
 
   
 
4. Have you had any blood work, X-rays or cardiac testing? Yes Where: [facility] Reason for visit: Labs 5.  Where do you normally have your labs drawn? [facility]/PCP 6. Do you need any refills today?    Yes

## 2018-07-27 NOTE — LETTER
Vaishali Schumacher 1950 
 
7/27/2018 Dear Jyoti Palma, Javier Isaac MD 
 
I had the pleasure of evaluating  Ms. Cesar Jacobson in office today. Below are the relevant portions of my assessment and plan of care. ICD-10-CM ICD-9-CM 1. Atherosclerosis of native coronary artery of native heart without angina pectoris I25.10 414.01   
2. Paroxysmal atrial fibrillation (HCC) I48.0 427.31 apixaban (ELIQUIS) 5 mg tablet 197 AT/AF episodes seen in ICD interrogation D/C aspirin and plavix - begin Eliquis 5 mg / BID Begin Eliquis 5 mg / BID 3. Essential hypertension, benign I10 401.1 hydroCHLOROthiazide (HYDRODIURIL) 12.5 mg tablet 4. Mixed hyperlipidemia E78.2 272.2 5. ICD (implantable cardioverter-defibrillator) in place-DDD ICD Z95.810 V45.02   
6. Postsurgical aortocoronary bypass status Z95.1 V45.81 Current Outpatient Prescriptions Medication Sig Dispense Refill  apixaban (ELIQUIS) 5 mg tablet Take 1 Tab by mouth two (2) times a day. 180 Tab 3  
 hydroCHLOROthiazide (HYDRODIURIL) 12.5 mg tablet Take 1 Tab by mouth daily. 30 Tab 3  
 metoprolol tartrate (LOPRESSOR) 100 mg IR tablet take 1 tablet by mouth twice a day 60 Tab 3  
 acetaminophen (TYLENOL) 500 mg tablet Take  by mouth every six (6) hours as needed for Pain.  ezetimibe (ZETIA) 10 mg tablet Take  by mouth.  rosuvastatin (CRESTOR) 40 mg tablet Take 1 Tab by mouth nightly. 30 Tab 4  
 amLODIPine (NORVASC) 10 mg tablet Take 10 mg by mouth daily. Orders Placed This Encounter  apixaban (ELIQUIS) 5 mg tablet Sig: Take 1 Tab by mouth two (2) times a day. Dispense:  180 Tab Refill:  3  
 hydroCHLOROthiazide (HYDRODIURIL) 12.5 mg tablet Sig: Take 1 Tab by mouth daily. Dispense:  30 Tab Refill:  3 If you have questions, please do not hesitate to call me. I look forward to following Ms. Cesar Jacobson along with you. Sincerely, Sha Ospina MD

## 2018-07-27 NOTE — PATIENT INSTRUCTIONS
Get ICD [facility] ASAP Medications Discontinued During This Encounter Medication Reason  apixaban (ELIQUIS) 5 mg tablet Reorder After the recommended changes have been made in blood pressure medicines, patient advised to keep BP/HR(pulse rate) chart twice daily and bring us results in next 2 weeks or so. Patient may send the results via \"My Chart\" if desired. Please rest for 5-10 minutes before checking blood pressure High Blood Pressure: Care Instructions Your Care Instructions If your blood pressure is usually above 130/80, you have high blood pressure, or hypertension. That means the top number is 130 or higher or the bottom number is 80 or higher, or both. Despite what a lot of people think, high blood pressure usually doesn't cause headaches or make you feel dizzy or lightheaded. It usually has no symptoms. But it does increase your risk for heart attack, stroke, and kidney or eye damage. The higher your blood pressure, the more your risk increases. Your doctor will give you a goal for your blood pressure. Your goal will be based on your health and your age. Lifestyle changes, such as eating healthy and being active, are always important to help lower blood pressure. You might also take medicine to reach your blood pressure goal. 
Follow-up care is a key part of your treatment and safety. Be sure to make and go to all appointments, and call your doctor if you are having problems. It's also a good idea to know your test results and keep a list of the medicines you take. How can you care for yourself at home? Medical treatment · If you stop taking your medicine, your blood pressure will go back up. You may take one or more types of medicine to lower your blood pressure. Be safe with medicines. Take your medicine exactly as prescribed. Call your doctor if you think you are having a problem with your medicine. · Talk to your doctor before you start taking aspirin every day.  Aspirin can help certain people lower their risk of a heart attack or stroke. But taking aspirin isn't right for everyone, because it can cause serious bleeding. · See your doctor regularly. You may need to see the doctor more often at first or until your blood pressure comes down. · If you are taking blood pressure medicine, talk to your doctor before you take decongestants or anti-inflammatory medicine, such as ibuprofen. Some of these medicines can raise blood pressure. · Learn how to check your blood pressure at home. Lifestyle changes · Stay at a healthy weight. This is especially important if you put on weight around the waist. Losing even 10 pounds can help you lower your blood pressure. · If your doctor recommends it, get more exercise. Walking is a good choice. Bit by bit, increase the amount you walk every day. Try for at least 30 minutes on most days of the week. You also may want to swim, bike, or do other activities. · Avoid or limit alcohol. Talk to your doctor about whether you can drink any alcohol. · Try to limit how much sodium you eat to less than 2,300 milligrams (mg) a day. Your doctor may ask you to try to eat less than 1,500 mg a day. · Eat plenty of fruits (such as bananas and oranges), vegetables, legumes, whole grains, and low-fat dairy products. · Lower the amount of saturated fat in your diet. Saturated fat is found in animal products such as milk, cheese, and meat. Limiting these foods may help you lose weight and also lower your risk for heart disease. · Do not smoke. Smoking increases your risk for heart attack and stroke. If you need help quitting, talk to your doctor about stop-smoking programs and medicines. These can increase your chances of quitting for good. When should you call for help? Call 911 anytime you think you may need emergency care. This may mean having symptoms that suggest that your blood pressure is causing a serious heart or blood vessel problem.  Your blood pressure may be over 180/110. 
 For example, call 911 if: 
  · You have symptoms of a heart attack. These may include: ¨ Chest pain or pressure, or a strange feeling in the chest. 
¨ Sweating. ¨ Shortness of breath. ¨ Nausea or vomiting. ¨ Pain, pressure, or a strange feeling in the back, neck, jaw, or upper belly or in one or both shoulders or arms. ¨ Lightheadedness or sudden weakness. ¨ A fast or irregular heartbeat.  
  · You have symptoms of a stroke. These may include: 
¨ Sudden numbness, tingling, weakness, or loss of movement in your face, arm, or leg, especially on only one side of your body. ¨ Sudden vision changes. ¨ Sudden trouble speaking. ¨ Sudden confusion or trouble understanding simple statements. ¨ Sudden problems with walking or balance. ¨ A sudden, severe headache that is different from past headaches.  
  · You have severe back or belly pain.  
 Do not wait until your blood pressure comes down on its own. Get help right away. 
 Call your doctor now or seek immediate care if: 
  · Your blood pressure is much higher than normal (such as 180/110 or higher), but you don't have symptoms.  
  · You think high blood pressure is causing symptoms, such as: ¨ Severe headache. ¨ Blurry vision.  
 Watch closely for changes in your health, and be sure to contact your doctor if: 
  · Your blood pressure measures 140/90 or higher at least 2 times. That means the top number is 140 or higher or the bottom number is 90 or higher, or both.  
  · You think you may be having side effects from your blood pressure medicine.  
  · Your blood pressure is usually normal, but it goes above normal at least 2 times. Where can you learn more? Go to http://eric-randee.info/. Enter Z361 in the search box to learn more about \"High Blood Pressure: Care Instructions. \" Current as of: December 6, 2017 Content Version: 11.7 © 5202-1417 [employer].  Care instructions adapted under license by [address] (which disclaims liability or warranty for this information). If you have questions about a medical condition or this instruction, always ask your healthcare professional. [address] any warranty or liability for your use of this information.

## 2018-07-27 NOTE — PROGRESS NOTES
HISTORY OF PRESENT ILLNESS Jolene Francois is a 69 y.o. female. HPI Comments:  
 
Patient denies significant chest pain, SOB, palpitations, edema, dizziness Hypertension The history is provided by the medical records. This is a chronic problem. Pertinent negatives include no chest pain, no headaches and no shortness of breath. Cholesterol Problem The history is provided by the medical records. This is a chronic problem. Pertinent negatives include no chest pain, no headaches and no shortness of breath. Review of Systems Constitutional: Negative for chills, fever, malaise/fatigue and weight loss. HENT: Negative for nosebleeds. Eyes: Negative for discharge. Respiratory: Negative for cough, shortness of breath and wheezing. Cardiovascular: Negative for chest pain, palpitations, orthopnea, claudication, leg swelling and PND. Gastrointestinal: Negative for diarrhea, nausea and vomiting. Genitourinary: Negative for dysuria and hematuria. Musculoskeletal: Negative for joint pain. Skin: Negative for rash. Neurological: Negative for dizziness, seizures, loss of consciousness and headaches. Endo/Heme/Allergies: Negative for polydipsia. Does not bruise/bleed easily. Psychiatric/Behavioral: Negative for depression and substance abuse. The patient does not have insomnia. Allergies Allergen Reactions  Losartan Angioedema Tongue swelling but not on lips Past Medical History:  
Diagnosis Date  Arthritis  Automatic implantable cardiac defibrillator in situ  Coronary atherosclerosis of native coronary artery CABG 10/02 with LIMA to LAD and SVG to RCA  Depression  Essential hypertension  Headache(784.0)  Other and unspecified hyperlipidemia 9/12 Low density lipoproteins are at goal, triglycerides are at goal, high density lipoproteins are at goal.  
 Postsurgical aortocoronary bypass status 2002  Presence of permanent cardiac pacemaker-DDD 11/6/2015 OFFICE CHECK : 8/14 nl function, +AHR rare(changed detection to over 150/mt); 11/15 nl function  Unspecified arthropathy, lower leg S/P Left knee replacement. Had repeat surgery's for her left knee.  UTI (urinary tract infection) Family History Problem Relation Age of Onset  Arthritis-osteo Other   
  not specified  Hypertension Other   
  not specified  Sudden Death Neg Hx   
 Heart Attack Neg Hx Social History Substance Use Topics  Smoking status: Light Tobacco Smoker Packs/day: 0.05  Smokeless tobacco: Never Used Comment: 4/18 one cigarette a day  Alcohol use No  
  
 
Current Outpatient Prescriptions Medication Sig  
 metoprolol tartrate (LOPRESSOR) 100 mg IR tablet take 1 tablet by mouth twice a day  apixaban (ELIQUIS) 5 mg tablet Take 1 Tab by mouth two (2) times a day.  acetaminophen (TYLENOL) 500 mg tablet Take  by mouth every six (6) hours as needed for Pain.  ezetimibe (ZETIA) 10 mg tablet Take  by mouth.  rosuvastatin (CRESTOR) 40 mg tablet Take 1 Tab by mouth nightly.  amLODIPine (NORVASC) 10 mg tablet Take 10 mg by mouth daily. No current facility-administered medications for this visit. Past Surgical History:  
Procedure Laterality Date  CARDIAC SURG PROCEDURE UNLIST  11/09  
 gen change-obici  HX CORONARY ARTERY BYPASS GRAFT  10/2002 [facility] ORTHOPAEDIC  [id number] Reduced two component revision using the Sigma System. Lateral release. Suture anchor repair of medial collateral ligament. [address]  [id number] Left knee arthrofibrosis, status post significant scarring, status post left total knee replacement  VASCULAR SURGERY PROCEDURE UNLIST  2005 [facility]. Dr. Montez Pedersen Diagnostic Studies: 
CARDIOLOGY STUDIES 10/5/2017 2/13/2013 1/1/2013 1/1/2009 2/1/2008 10/1/2002 Myocardial Perfusion Scan Result - - ant ischemia, nl EF - abn scan, fixed anterolateral wall defect with partially reversible defect, EF 70% - Cardiac Cath Result - patent LIMA, sev native CAD including LM, med Rx for now - - - - Pharmacological Nuclear Stress Test Result mild small infbasal ischemia, 73%EF - - - - -  
Echocardiogram - Complete Result - - - EF 65%, PAP 33 - - Coronary Angiogram Result - - - - - Bypass x's 2 Some recent data might be hidden Visit Vitals  /60  Pulse 60  Ht 5' 10\" (1.778 m)  Wt 179 lb (81.2 kg)  BMI 25.68 kg/m2 Ms. Jayson Naranjo has a reminder for a \"due or due soon\" health maintenance. I have asked that she contact her primary care provider for follow-up on this health maintenance. Physical Exam  
Constitutional: She is oriented to person, place, and time. She appears well-developed and well-nourished. No distress. kyphosis HENT:  
Head: Normocephalic and atraumatic. Eyes: Right eye exhibits no discharge. Left eye exhibits no discharge. No scleral icterus. Neck: Neck supple. No JVD present. Carotid bruit is not present. No thyromegaly present. Cardiovascular: Normal rate, regular rhythm, S1 normal, S2 normal, normal heart sounds and intact distal pulses. Exam reveals no gallop and no friction rub. No murmur heard. Pulmonary/Chest: Effort normal and breath sounds normal. She has no wheezes. She has no rales. Abdominal: Soft. She exhibits no mass. There is no tenderness. Musculoskeletal: She exhibits no edema. Cant put legs straight Neurological: She is alert and oriented to person, place, and time. Skin: Skin is warm and dry. No rash noted. Psychiatric: She has a normal mood and affect. Her behavior is normal.  
wounds healing well left subclavicular area ASSESSMENT and PLAN Stable CAD. Continue present medications. AFib / Flutter seen on interrogation of ICD Started Eliquis 5 mg / BID 
HLD: 5/17 LDL69; 2/16 LDL81;  
LIPID COMPLETE PANEL3/26/2018 [employer] Component Name Value Ref Range Cholesterol 158 110 - 200 mg/dL Triglyceride 98 40 - 149 mg/dL HDL 64 (H) 40 - 59 mg/dL Cholesterol/HDL 2.5 0.0 - 5.0 LDL CALCULATION 75 50 - 99 mg/dL VLDL CALCULATION 20 HCTZ 12.5 mg added as  systolic today. Follow-up home chart. Continue Eliquis due to frequent AT/A. fib on ICD interrogation. Diagnoses and all orders for this visit: 1. Atherosclerosis of native coronary artery of native heart without angina pectoris 2. Paroxysmal atrial fibrillation (HCC) Comments: 
197 AT/AF episodes seen in ICD interrogation D/C aspirin and plavix - begin Eliquis 5 mg / BID Begin Eliquis 5 mg / BID Orders: 
-     apixaban (ELIQUIS) 5 mg tablet; Take 1 Tab by mouth two (2) times a day. 3. Essential hypertension, benign 
-     hydroCHLOROthiazide (HYDRODIURIL) 12.5 mg tablet; Take 1 Tab by mouth daily. 4. Mixed hyperlipidemia 5. ICD (implantable cardioverter-defibrillator) in place-DDD ICD 6. Postsurgical aortocoronary bypass status Pertinent laboratory and test data reviewed and discussed with patient. See patient instructions also for other medical advice given Medications Discontinued During This Encounter Medication Reason  apixaban (ELIQUIS) 5 mg tablet Reorder Follow-up Disposition: 
Return in about 6 months (around 1/27/2019), or if symptoms worsen or fail to improve.

## 2018-07-27 NOTE — MR AVS SNAPSHOT
[address] 
 
 
 [address] [facility] 
[phone] Patient: Cynthia Carey MRN: [id number] MB Visit Information Date & Time Provider Department Dept. Phone Encounter #  
 2018  8:15 AM Lucien Vee MD [facility] [phone] Follow-up Instructions Return in about 6 months (around 2019), or if symptoms worsen or fail to improve. Your Appointments 10/17/2018  8:15 AM  
[facility] ([address]) Appt Note: Carelink Transmission/[place]/Wireless [address] [facility] [address]  
  
   
 [address] [facility]  
  
    
 2019  8:15 AM  
[facility] ([address]) Appt Note: Carelink Transmission/[place]/Wireless [address] [facility]  
[phone] Upcoming Health Maintenance Date Due Hepatitis C Screening 1950 DTaP/Tdap/Td series (1 - Tdap) 12/3/1969 BREAST CANCER SCRN MAMMOGRAM 12/3/1998 FOBT Q 1 YEAR AGE 50-75 12/3/1998 ZOSTER VACCINE AGE 60> 10/3/2008 GLAUCOMA SCREENING Q2Y 12/3/2013 Bone Densitometry (Dexa) Screening 12/3/2013 Pneumococcal 65+ Low/Medium Risk (1 of 2 - PCV13) 12/3/2013 MEDICARE YEARLY EXAM 3/14/2018 Influenza Age 5 to Adult 2018 Allergies as of 2018  Review Complete On: 2018 By: Lucien Vee MD  
  
 Severity Noted Reaction Type Reactions Losartan High 10/22/2013   Intolerance Angioedema Tongue swelling but not on lips Current Immunizations  Never Reviewed No immunizations on file. Not reviewed this visit You Were Diagnosed With   
  
 Codes Comments Atherosclerosis of native coronary artery of native heart without angina pectoris    -  Primary ICD-10-CM: I25.10 ICD-9-CM: 414.01 Paroxysmal atrial fibrillation (HCC)     ICD-10-CM: I48.0 ICD-9-CM: 427.31 197 AT/AF episodes seen in ICD interrogation D/C aspirin and plavix - begin Eliquis 5 mg / BID Begin Eliquis 5 mg / BID Essential hypertension, benign     ICD-10-CM: I10 
ICD-9-CM: 401.1 Mixed hyperlipidemia     ICD-10-CM: E78.2 ICD-9-CM: 272.2 ICD (implantable cardioverter-defibrillator) in place     ICD-10-CM: Z95.810 ICD-9-CM: V45.02 Postsurgical aortocoronary bypass status     ICD-10-CM: Z95.1 ICD-9-CM: V45.81 Vitals BP Pulse Height(growth percentile) Weight(growth percentile) BMI OB Status 144/60 60 5' 10\" (1.778 m) 179 lb (81.2 kg) 25.68 kg/m2 Menopause Smoking Status Light Tobacco Smoker Vitals History BMI and BSA Data Body Mass Index Body Surface Area  
 25.68 kg/m 2 2 m 2 Preferred Pharmacy Pharmacy Name Phone [address] [facility], [address] [facility] Your Updated Medication List  
  
   
This list is accurate as of 7/27/18  9:18 AM.  Always use your most recent med list.  
  
  
  
  
 acetaminophen 500 mg tablet Commonly known as:  TYLENOL Take  by mouth every six (6) hours as needed for Pain. apixaban 5 mg tablet Commonly known as:  Timmothy Tucker Take 1 Tab by mouth two (2) times a day.  
  
 ezetimibe 10 mg tablet Commonly known as:  Chelle Paget Take  by mouth. hydroCHLOROthiazide 12.5 mg tablet Commonly known as:  HYDRODIURIL Take 1 Tab by mouth daily. metoprolol tartrate 100 mg IR tablet Commonly known as:  LOPRESSOR  
take 1 tablet by mouth twice a day NORVASC 10 mg tablet Generic drug:  amLODIPine Take 10 mg by mouth daily. rosuvastatin 40 mg tablet Commonly known as:  CRESTOR Take 1 Tab by mouth nightly. Prescriptions Sent to Pharmacy  Refills  
 apixaban (ELIQUIS) 5 mg tablet 3  
 Sig: Take 1 Tab by mouth two (2) times a day. Class: Normal  
 Pharmacy: [address] [facility], [address] [facility] Ph #: [phone] Route: Oral  
 hydroCHLOROthiazide (HYDRODIURIL) 12.5 mg tablet 3 Sig: Take 1 Tab by mouth daily. Class: Normal  
 Pharmacy: [address] [facility], [address] [facility] Ph #: [phone] Route: Oral  
  
Follow-up Instructions Return in about 6 months (around 1/27/2019), or if symptoms worsen or fail to improve. Patient Instructions Get ICD CareLink ASAP Medications Discontinued During This Encounter Medication Reason  apixaban (ELIQUIS) 5 mg tablet Reorder After the recommended changes have been made in blood pressure medicines, patient advised to keep BP/HR(pulse rate) chart twice daily and bring us results in next 2 weeks or so. Patient may send the results via \"My Chart\" if desired. Please rest for 5-10 minutes before checking blood pressure High Blood Pressure: Care Instructions Your Care Instructions If your blood pressure is usually above 130/80, you have high blood pressure, or hypertension. That means the top number is 130 or higher or the bottom number is 80 or higher, or both. Despite what a lot of people think, high blood pressure usually doesn't cause headaches or make you feel dizzy or lightheaded. It usually has no symptoms. But it does increase your risk for heart attack, stroke, and kidney or eye damage. The higher your blood pressure, the more your risk increases. Your doctor will give you a goal for your blood pressure. Your goal will be based on your health and your age. Lifestyle changes, such as eating healthy and being active, are always important to help lower blood pressure. You might also take medicine to reach your blood pressure goal. 
Follow-up care is a key part of your treatment and safety.  Be sure to make and go to all appointments, and call your doctor if you are having problems. It's also a good idea to know your test results and keep a list of the medicines you take. How can you care for yourself at home? Medical treatment · If you stop taking your medicine, your blood pressure will go back up. You may take one or more types of medicine to lower your blood pressure. Be safe with medicines. Take your medicine exactly as prescribed. Call your doctor if you think you are having a problem with your medicine. · Talk to your doctor before you start taking aspirin every day. Aspirin can help certain people lower their risk of a heart attack or stroke. But taking aspirin isn't right for everyone, because it can cause serious bleeding. · See your doctor regularly. You may need to see the doctor more often at first or until your blood pressure comes down. · If you are taking blood pressure medicine, talk to your doctor before you take decongestants or anti-inflammatory medicine, such as ibuprofen. Some of these medicines can raise blood pressure. · Learn how to check your blood pressure at home. Lifestyle changes · Stay at a healthy weight. This is especially important if you put on weight around the waist. Losing even 10 pounds can help you lower your blood pressure. · If your doctor recommends it, get more exercise. Walking is a good choice. Bit by bit, increase the amount you walk every day. Try for at least 30 minutes on most days of the week. You also may want to swim, bike, or do other activities. · Avoid or limit alcohol. Talk to your doctor about whether you can drink any alcohol. · Try to limit how much sodium you eat to less than 2,300 milligrams (mg) a day. Your doctor may ask you to try to eat less than 1,500 mg a day. · Eat plenty of fruits (such as bananas and oranges), vegetables, legumes, whole grains, and low-fat dairy products. · Lower the amount of saturated fat in your diet. Saturated fat is found in animal products such as milk, cheese, and meat. Limiting these foods may help you lose weight and also lower your risk for heart disease. · Do not smoke. Smoking increases your risk for heart attack and stroke. If you need help quitting, talk to your doctor about stop-smoking programs and medicines. These can increase your chances of quitting for good. When should you call for help? Call 911 anytime you think you may need emergency care. This may mean having symptoms that suggest that your blood pressure is causing a serious heart or blood vessel problem. Your blood pressure may be over 180/110. 
 For example, call 911 if: 
  · You have symptoms of a heart attack. These may include: ¨ Chest pain or pressure, or a strange feeling in the chest. 
¨ Sweating. ¨ Shortness of breath. ¨ Nausea or vomiting. ¨ Pain, pressure, or a strange feeling in the back, neck, jaw, or upper belly or in one or both shoulders or arms. ¨ Lightheadedness or sudden weakness. ¨ A fast or irregular heartbeat.  
  · You have symptoms of a stroke. These may include: 
¨ Sudden numbness, tingling, weakness, or loss of movement in your face, arm, or leg, especially on only one side of your body. ¨ Sudden vision changes. ¨ Sudden trouble speaking. ¨ Sudden confusion or trouble understanding simple statements. ¨ Sudden problems with walking or balance. ¨ A sudden, severe headache that is different from past headaches.  
  · You have severe back or belly pain.  
 Do not wait until your blood pressure comes down on its own. Get help right away. 
 Call your doctor now or seek immediate care if: 
  · Your blood pressure is much higher than normal (such as 180/110 or higher), but you don't have symptoms.  
  · You think high blood pressure is causing symptoms, such as: ¨ Severe headache. ¨ Blurry vision.  Watch closely for changes in your health, and be sure to contact your doctor if: 
  · Your blood pressure measures 140/90 or higher at least 2 times. That means the top number is 140 or higher or the bottom number is 90 or higher, or both.  
  · You think you may be having side effects from your blood pressure medicine.  
  · Your blood pressure is usually normal, but it goes above normal at least 2 times. Where can you learn more? Go to http://eric-randee.info/. Enter F645 in the search box to learn more about \"High Blood Pressure: Care Instructions. \" Current as of: December 6, 2017 Content Version: 11.7 © 9967-5400 Signature. Care instructions adapted under license by [facility] (which disclaims liability or warranty for this information). If you have questions about a medical condition or this instruction, always ask your healthcare professional. [place] [address] any warranty or liability for your use of this information. Introducing [facility]! [address] [facility] introduces YouCastr patient portal. Now you can access parts of your medical record, email your doctor's office, and request medication refills online. 1. In your internet browser, go to [email or web address] 2. Click on the First Time User? Click Here link in the Sign In box. You will see the New Member Sign Up page. 3. Enter your YouCastr Access Code exactly as it appears below. You will not need to use this code after youve completed the sign-up process. If you do not sign up before the expiration date, you must request a new code. · YouCastr Access Code: [email or web address] Expires: 10/25/2018  9:18 AM 
 
4. Enter the last four digits of your Social Security Number (xxxx) and Date of Birth (mm/dd/yyyy) as indicated and click Submit. You will be taken to the next sign-up page. 5. Create a AdTrib ID. This will be your AdTrib login ID and cannot be changed, so think of one that is secure and easy to remember. 6. Create a AdTrib password. You can change your password at any time. 7. Enter your Password Reset Question and Answer. This can be used at a later time if you forget your password. 8. Enter your e-mail address. You will receive e-mail notification when new information is available in [address] 9. Click Sign Up. You can now view and download portions of your medical record. 10. Click the Download Summary menu link to download a portable copy of your medical information. If you have questions, please visit the Frequently Asked Questions section of the AdTrib website. Remember, AdTrib is NOT to be used for urgent needs. For medical emergencies, dial 911. Now available from your iPhone and Android! Please provide this summary of care documentation to your next provider. Your primary care clinician is listed as Deaan Dunn. If you have any questions after today's visit, please call [phone].

## 2018-10-24 DIAGNOSIS — I25.10 ATHEROSCLEROSIS OF NATIVE CORONARY ARTERY OF NATIVE HEART WITHOUT ANGINA PECTORIS: ICD-10-CM

## 2018-10-24 DIAGNOSIS — I10 ESSENTIAL HYPERTENSION, BENIGN: ICD-10-CM

## 2018-10-24 RX ORDER — METOPROLOL TARTRATE 100 MG/1
TABLET ORAL
Qty: 60 TAB | Refills: 3 | Status: SHIPPED | OUTPATIENT
Start: 2018-10-24 | End: 2019-04-20 | Stop reason: SDUPTHER

## 2018-11-20 LAB
ANION GAP SERPL CALC-SCNC: 10.1 MMOL/L
BUN SERPL-MCNC: 16 MG/DL (ref 6–22)
CALCIUM SERPL-MCNC: 8.8 MG/DL (ref 8.4–10.5)
CHLORIDE SERPL-SCNC: 105 MMOL/L (ref 98–110)
CO2 SERPL-SCNC: 27 MMOL/L (ref 20–32)
CREAT SERPL-MCNC: 0.9 MG/DL (ref 0.8–1.4)
GFRAA, 66117: >60
GFRNA, 66118: 58.3
GLUCOSE SERPL-MCNC: 75 MG/DL (ref 70–99)
POTASSIUM SERPL-SCNC: 3.8 MMOL/L (ref 3.5–5.5)
SODIUM SERPL-SCNC: 142 MMOL/L (ref 133–145)

## 2019-02-08 ENCOUNTER — OFFICE VISIT (OUTPATIENT)
Dept: CARDIOLOGY CLINIC | Age: 69
End: 2019-02-08

## 2019-02-08 VITALS
WEIGHT: 183 LBS | BODY MASS INDEX: 26.2 KG/M2 | HEART RATE: 51 BPM | HEIGHT: 70 IN | DIASTOLIC BLOOD PRESSURE: 69 MMHG | SYSTOLIC BLOOD PRESSURE: 144 MMHG

## 2019-02-08 DIAGNOSIS — Z71.6 TOBACCO ABUSE COUNSELING: ICD-10-CM

## 2019-02-08 DIAGNOSIS — Z95.810 ICD (IMPLANTABLE CARDIOVERTER-DEFIBRILLATOR) IN PLACE: ICD-10-CM

## 2019-02-08 DIAGNOSIS — I25.10 ATHEROSCLEROSIS OF NATIVE CORONARY ARTERY OF NATIVE HEART WITHOUT ANGINA PECTORIS: Primary | ICD-10-CM

## 2019-02-08 DIAGNOSIS — E78.2 MIXED HYPERLIPIDEMIA: ICD-10-CM

## 2019-02-08 DIAGNOSIS — I10 ESSENTIAL HYPERTENSION, BENIGN: ICD-10-CM

## 2019-02-08 DIAGNOSIS — Z95.1 POSTSURGICAL AORTOCORONARY BYPASS STATUS: ICD-10-CM

## 2019-02-08 DIAGNOSIS — I48.0 PAROXYSMAL ATRIAL FIBRILLATION (HCC): ICD-10-CM

## 2019-02-08 RX ORDER — DOXAZOSIN 1 MG/1
1 TABLET ORAL
Qty: 30 TAB | Refills: 3 | Status: SHIPPED | OUTPATIENT
Start: 2019-02-08 | End: 2019-05-25 | Stop reason: SDUPTHER

## 2019-02-08 NOTE — PATIENT INSTRUCTIONS
There are no discontinued medications. After the recommended changes have been made in blood pressure medicines, patient advised to keep BP/HR(pulse rate) chart twice daily and bring us results in next 2 weeks or so. Patient may send the results via \"My Chart\" if desired. Please rest for 5-10 minutes before checking blood pressure       High Blood Pressure: Care Instructions  Overview    It's normal for blood pressure to go up and down throughout the day. But if it stays up, you have high blood pressure. Another name for high blood pressure is hypertension. Despite what a lot of people think, high blood pressure usually doesn't cause headaches or make you feel dizzy or lightheaded. It usually has no symptoms. But it does increase your risk of stroke, heart attack, and other problems. You and your doctor will talk about your risks of these problems based on your blood pressure. Your doctor will give you a goal for your blood pressure. Your goal will be based on your health and your age. Lifestyle changes, such as eating healthy and being active, are always important to help lower blood pressure. You might also take medicine to reach your blood pressure goal.  Follow-up care is a key part of your treatment and safety. Be sure to make and go to all appointments, and call your doctor if you are having problems. It's also a good idea to know your test results and keep a list of the medicines you take. How can you care for yourself at home? Medical treatment  · If you stop taking your medicine, your blood pressure will go back up. You may take one or more types of medicine to lower your blood pressure. Be safe with medicines. Take your medicine exactly as prescribed. Call your doctor if you think you are having a problem with your medicine. · Talk to your doctor before you start taking aspirin every day. Aspirin can help certain people lower their risk of a heart attack or stroke.  But taking aspirin isn't right for everyone, because it can cause serious bleeding. · See your doctor regularly. You may need to see the doctor more often at first or until your blood pressure comes down. · If you are taking blood pressure medicine, talk to your doctor before you take decongestants or anti-inflammatory medicine, such as ibuprofen. Some of these medicines can raise blood pressure. · Learn how to check your blood pressure at home. Lifestyle changes  · Stay at a healthy weight. This is especially important if you put on weight around the waist. Losing even 10 pounds can help you lower your blood pressure. · If your doctor recommends it, get more exercise. Walking is a good choice. Bit by bit, increase the amount you walk every day. Try for at least 30 minutes on most days of the week. You also may want to swim, bike, or do other activities. · Avoid or limit alcohol. Talk to your doctor about whether you can drink any alcohol. · Try to limit how much sodium you eat to less than 2,300 milligrams (mg) a day. Your doctor may ask you to try to eat less than 1,500 mg a day. · Eat plenty of fruits (such as bananas and oranges), vegetables, legumes, whole grains, and low-fat dairy products. · Lower the amount of saturated fat in your diet. Saturated fat is found in animal products such as milk, cheese, and meat. Limiting these foods may help you lose weight and also lower your risk for heart disease. · Do not smoke. Smoking increases your risk for heart attack and stroke. If you need help quitting, talk to your doctor about stop-smoking programs and medicines. These can increase your chances of quitting for good. When should you call for help? Call 911 anytime you think you may need emergency care. This may mean having symptoms that suggest that your blood pressure is causing a serious heart or blood vessel problem. Your blood pressure may be over 180/120.   For example, call 911 if:    · You have symptoms of a heart attack. These may include:  ? Chest pain or pressure, or a strange feeling in the chest.  ? Sweating. ? Shortness of breath. ? Nausea or vomiting. ? Pain, pressure, or a strange feeling in the back, neck, jaw, or upper belly or in one or both shoulders or arms. ? Lightheadedness or sudden weakness. ? A fast or irregular heartbeat.     · You have symptoms of a stroke. These may include:  ? Sudden numbness, tingling, weakness, or loss of movement in your face, arm, or leg, especially on only one side of your body. ? Sudden vision changes. ? Sudden trouble speaking. ? Sudden confusion or trouble understanding simple statements. ? Sudden problems with walking or balance. ? A sudden, severe headache that is different from past headaches.     · You have severe back or belly pain.    Do not wait until your blood pressure comes down on its own. Get help right away.   Call your doctor now or seek immediate care if:    · Your blood pressure is much higher than normal (such as 180/120 or higher), but you don't have symptoms.     · You think high blood pressure is causing symptoms, such as:  ? Severe headache.  ? Blurry vision.    Watch closely for changes in your health, and be sure to contact your doctor if:    · Your blood pressure measures higher than your doctor recommends at least 2 times. That means the top number is higher or the bottom number is higher, or both.     · You think you may be having side effects from your blood pressure medicine. Where can you learn more? Go to http://eric-randee.info/. Enter P846 in the search box to learn more about \"High Blood Pressure: Care Instructions. \"  Current as of: July 22, 2018  Content Version: 11.9  © 7182-4129 [employer]. Care instructions adapted under license by [employer] (which disclaims liability or warranty for this information).  If you have questions about a medical condition or this instruction, always ask your healthcare professional. [address] any warranty or liability for your use of this information. Wiramah[employer] Activation    Thank you for requesting access to HackerRank. Please follow the instructions below to securely access and download your online medical record. HackerRank allows you to send messages to your doctor, view your test results, renew your prescriptions, schedule appointments, and more. How Do I Sign Up? 1. In your internet browser, go to [email or web address]. 2. Click on the First Time User? Click Here link in the Sign In box. You will see the New Member Sign Up page. 3. Enter your HackerRank Access Code exactly as it appears below. You will not need to use this code after youve completed the sign-up process. If you do not sign up before the expiration date, you must request a new code. HackerRank Access Code: [email or web address]  Expires: 3/25/2019 11:28 AM (This is the date your HackerRank access code will )    4. Enter the last four digits of your Social Security Number (xxxx) and Date of Birth (mm/dd/yyyy) as indicated and click Submit. You will be taken to the next sign-up page. 5. Create a HackerRank ID. This will be your HackerRank login ID and cannot be changed, so think of one that is secure and easy to remember. 6. Create a HackerRank password. You can change your password at any time. 7. Enter your Password Reset Question and Answer. This can be used at a later time if you forget your password. 8. Enter your e-mail address. You will receive e-mail notification when new information is available in [address] 9. Click Sign Up. You can now view and download portions of your medical record. 10. Click the Download Summary menu link to download a portable copy of your medical information.     Additional Information    If you have questions, please visit the Frequently Asked Questions section of the HackerRank website at [email or web address] Remember, [employer] is NOT to be used for urgent needs. For medical emergencies, dial 911.

## 2019-02-08 NOTE — PROGRESS NOTES
HISTORY OF PRESENT ILLNESS  Nati Dyer is a 77 y.o. female. Patient denies significant chest pain, SOB, palpitations, edema, dizziness        Hypertension   The history is provided by the medical records. This is a chronic problem. Pertinent negatives include no chest pain, no headaches and no shortness of breath. Cholesterol Problem   The history is provided by the medical records. This is a chronic problem. Pertinent negatives include no chest pain, no headaches and no shortness of breath. Review of Systems   Constitutional: Negative for chills, fever, malaise/fatigue and weight loss. HENT: Negative for nosebleeds. Eyes: Negative for discharge. Respiratory: Negative for cough, shortness of breath and wheezing. Cardiovascular: Negative for chest pain, palpitations, orthopnea, claudication, leg swelling and PND. Gastrointestinal: Negative for diarrhea, nausea and vomiting. Genitourinary: Negative for dysuria and hematuria. Musculoskeletal: Negative for joint pain. Skin: Negative for rash. Neurological: Negative for dizziness, seizures, loss of consciousness and headaches. Endo/Heme/Allergies: Negative for polydipsia. Does not bruise/bleed easily. Psychiatric/Behavioral: Negative for depression and substance abuse. The patient does not have insomnia.       Allergies   Allergen Reactions    Losartan Angioedema     Tongue swelling but not on lips       Past Medical History:   Diagnosis Date    Arthritis     Automatic implantable cardiac defibrillator in situ     Coronary atherosclerosis of native coronary artery     CABG 10/02 with LIMA to LAD and SVG to RCA    Depression     Essential hypertension     Headache(784.0)     Other and unspecified hyperlipidemia     9/12 Low density lipoproteins are at goal, triglycerides are at goal, high density lipoproteins are at goal.    Postsurgical aortocoronary bypass status 2002    Presence of permanent cardiac pacemaker-DDD 11/6/2015    OFFICE CHECK : 8/14 nl function, +AHR rare(changed detection to over 150/mt); 11/15 nl function     Unspecified arthropathy, lower leg     S/P Left knee replacement. Had repeat surgery's for her left knee.  UTI (urinary tract infection)        Family History   Problem Relation Age of Onset    Arthritis-osteo Other         not specified    Hypertension Other         not specified    Sudden Death Neg Hx     Heart Attack Neg Hx        Social History     Tobacco Use    Smoking status: Light Tobacco Smoker     Packs/day: 0.05    Smokeless tobacco: Never Used    Tobacco comment: 4/18 one cigarette a day   Substance Use Topics    Alcohol use: No    Drug use: Not on file        Current Outpatient Medications   Medication Sig    hydroCHLOROthiazide (HYDRODIURIL) 12.5 mg tablet take 1 tablet by mouth once daily (Patient taking differently: as needed)    metoprolol tartrate (LOPRESSOR) 100 mg IR tablet take 1 tablet by mouth twice a day    apixaban (ELIQUIS) 5 mg tablet Take 1 Tab by mouth two (2) times a day.  acetaminophen (TYLENOL) 500 mg tablet Take  by mouth every six (6) hours as needed for Pain.  ezetimibe (ZETIA) 10 mg tablet Take  by mouth daily.  rosuvastatin (CRESTOR) 40 mg tablet Take 1 Tab by mouth nightly.  amLODIPine (NORVASC) 10 mg tablet Take 10 mg by mouth daily. No current facility-administered medications for this visit. Past Surgical History:   Procedure Laterality Date    CARDIAC SURG PROCEDURE UNLIST  11/09    gen change-obici    HX CORONARY ARTERY BYPASS GRAFT  10/2002    HX ORTHOPAEDIC  054073    Reduced two component revision using the Sigma System. Lateral release. Suture anchor repair of medial collateral ligament. [facility]  [id number]    Left knee arthrofibrosis, status post significant scarring, status post left total knee replacement    VASCULAR SURGERY PROCEDURE UNLIST  2005    Aorto biffem.   Dr. Stefan Mcclure Studies:  CARDIOLOGY STUDIES 10/5/2017   Pharmacological Nuclear Stress Test Result mild small infbasal ischemia, 73%EF   Some recent data might be hidden       Visit Vitals  /69   Pulse (!) 51   Ht 5' 10\" (1.778 m)   Wt 83 kg (183 lb)   BMI 26.26 kg/m²       Ms. Carl Santana has a reminder for a \"due or due soon\" health maintenance. I have asked that she contact her primary care provider for follow-up on this health maintenance. Physical Exam   Constitutional: She is oriented to person, place, and time. She appears well-developed and well-nourished. No distress. kyphosis   HENT:   Head: Normocephalic and atraumatic. Eyes: Right eye exhibits no discharge. Left eye exhibits no discharge. No scleral icterus. Neck: Neck supple. No JVD present. Carotid bruit is not present. No thyromegaly present. Cardiovascular: Normal rate, regular rhythm, S1 normal, S2 normal, normal heart sounds and intact distal pulses. Exam reveals no gallop and no friction rub. No murmur heard. Pulmonary/Chest: Effort normal and breath sounds normal. She has no wheezes. She has no rales. Abdominal: Soft. She exhibits no mass. There is no tenderness. Musculoskeletal: She exhibits no edema. Cant put legs straight   Neurological: She is alert and oriented to person, place, and time. Skin: Skin is warm and dry. No rash noted. Psychiatric: She has a normal mood and affect.  Her behavior is normal.   wounds healing well left subclavicular area     ASSESSMENT and PLAN      AFib / Flutter seen on interrogation of ICD  Started Eliquis 5 mg / BID  HLD: 5/17 LDL69; 2/16 LDL81;   [address]  Component Name Value Ref Range   Cholesterol 144 110 - 200 mg/dL   Triglyceride 98 40 - 149 mg/dL   HDL 52 40 - 59 mg/dL   Cholesterol/HDL 2.8 0.0 - 5.0    LDL CALCULATION 73 50 - 99 mg/dL   VLDL CALCULATION 20 8 - 30 mg/dL   BiV ICD: gen change 11/09; 3/17  CARE LINK 10/10; 8/12; 4/13; 8/13; 2/16; 5/16; 8/16  nl function, nl volume; 2/17 nl function; DONNY; 8/17; 1/19 nl function, nl volume  OFFICE CHECK : 8/14 nl function, +AHR rare(changed detection to over 150/mt); 11/15; 11/16; 3/17 nl function, nl vol; 4/18 nl function, frequent AHR(likely AFlutter), nl volume      HCTZ 12.5 mg added as  systolic today. Follow-up home chart. Continue Eliquis due to frequent AT/A. fib on ICD interrogation. Stable CAD. Continue present medications. CAD is stable patient smokes only occasionally and was counseled to stop that also. She will try. Add low-dose Cardura for hypertension and follow the home chart. Diagnoses and all orders for this visit:    1. Atherosclerosis of native coronary artery of native heart without angina pectoris    2. Essential hypertension, benign  -     doxazosin (CARDURA) 1 mg tablet; Take 1 Tab by mouth nightly. 3. Paroxysmal atrial fibrillation (HCC)    4. Mixed hyperlipidemia    5. ICD (implantable cardioverter-defibrillator) in place-DDD ICD    6. Postsurgical aortocoronary bypass status    7. Tobacco abuse counseling        Pertinent laboratory and test data reviewed and discussed with patient. See patient instructions also for other medical advice given    There are no discontinued medications. Follow-up Disposition:  Return in about 6 months (around 8/8/2019), or if symptoms worsen or fail to improve.

## 2019-02-08 NOTE — PROGRESS NOTES
1. Have you been to the ER, urgent care clinic since your last visit? Hospitalized since your last visit?     no    2. Have you seen or consulted any other health care providers outside of the [address] [place] [address] since your last visit? Include any pap smears or colon screening. Yes Where:      3. Since your last visit, have you had any of the following symptoms? no    4. Have you had any blood work, X-rays or cardiac testing? No         5. Where do you normally have your labs drawn? [facility]  6. Do you need any refills today?    no

## 2019-02-08 NOTE — LETTER
Ariella Shows 1950 
 
2/8/2019 Dear MD Zay Flowers MD 
 
I had the pleasure of evaluating  Ms. Vandana Kraft in office today. Below are the relevant portions of my assessment and plan of care. ICD-10-CM ICD-9-CM 1. Atherosclerosis of native coronary artery of native heart without angina pectoris I25.10 414.01   
2. Essential hypertension, benign I10 401.1 doxazosin (CARDURA) 1 mg tablet 3. Paroxysmal atrial fibrillation (HCC) I48.0 427.31   
4. Mixed hyperlipidemia E78.2 272.2 5. ICD (implantable cardioverter-defibrillator) in place-DDD ICD Z95.810 V45.02   
6. Postsurgical aortocoronary bypass status Z95.1 V45.81   
7. Tobacco abuse counseling Z71.6 V65.42   
  305.1 Current Outpatient Medications Medication Sig Dispense Refill  doxazosin (CARDURA) 1 mg tablet Take 1 Tab by mouth nightly. 30 Tab 3  
 hydroCHLOROthiazide (HYDRODIURIL) 12.5 mg tablet take 1 tablet by mouth once daily (Patient taking differently: as needed) 30 Tab 1  
 metoprolol tartrate (LOPRESSOR) 100 mg IR tablet take 1 tablet by mouth twice a day 60 Tab 3  
 apixaban (ELIQUIS) 5 mg tablet Take 1 Tab by mouth two (2) times a day. 180 Tab 3  
 acetaminophen (TYLENOL) 500 mg tablet Take  by mouth every six (6) hours as needed for Pain.  ezetimibe (ZETIA) 10 mg tablet Take  by mouth daily.  rosuvastatin (CRESTOR) 40 mg tablet Take 1 Tab by mouth nightly. 30 Tab 4  
 amLODIPine (NORVASC) 10 mg tablet Take 10 mg by mouth daily. Orders Placed This Encounter  doxazosin (CARDURA) 1 mg tablet Sig: Take 1 Tab by mouth nightly. Dispense:  30 Tab Refill:  3 If you have questions, please do not hesitate to call me. I look forward to following Ms. Vandana Kraft along with you. Sincerely, James Reilly MD

## 2019-04-20 DIAGNOSIS — I10 ESSENTIAL HYPERTENSION, BENIGN: ICD-10-CM

## 2019-04-20 DIAGNOSIS — I25.10 ATHEROSCLEROSIS OF NATIVE CORONARY ARTERY OF NATIVE HEART WITHOUT ANGINA PECTORIS: ICD-10-CM

## 2019-04-23 RX ORDER — METOPROLOL TARTRATE 100 MG/1
TABLET ORAL
Qty: 60 TAB | Refills: 3 | Status: SHIPPED | OUTPATIENT
Start: 2019-04-23 | End: 2019-08-09 | Stop reason: SDUPTHER

## 2019-05-03 ENCOUNTER — CLINICAL SUPPORT (OUTPATIENT)
Dept: CARDIOLOGY CLINIC | Age: 69
End: 2019-05-03

## 2019-05-03 DIAGNOSIS — Z95.810 ICD (IMPLANTABLE CARDIOVERTER-DEFIBRILLATOR) IN PLACE: Primary | ICD-10-CM

## 2019-05-03 NOTE — PROGRESS NOTES
Pacemaker/defibrillator check only    DDD ICD: Normal function, normal volume, AT/AF episodes are far field sensing events.

## 2019-05-03 NOTE — PROGRESS NOTES
Last Office Visit date : 2/8/19  Next Office visit date : 8/2019  Last remote check date : 1/2019  scanned in our chart : yes

## 2019-05-25 DIAGNOSIS — I10 ESSENTIAL HYPERTENSION, BENIGN: ICD-10-CM

## 2019-05-28 RX ORDER — DOXAZOSIN 1 MG/1
TABLET ORAL
Qty: 30 TAB | Refills: 3 | Status: SHIPPED | OUTPATIENT
Start: 2019-05-28 | End: 2019-08-22

## 2019-07-28 DIAGNOSIS — I48.0 PAROXYSMAL ATRIAL FIBRILLATION (HCC): ICD-10-CM

## 2019-07-29 RX ORDER — APIXABAN 5 MG/1
TABLET, FILM COATED ORAL
Qty: 180 TAB | Refills: 3 | Status: SHIPPED | OUTPATIENT
Start: 2019-07-29 | End: 2020-09-14

## 2019-08-09 DIAGNOSIS — I25.10 ATHEROSCLEROSIS OF NATIVE CORONARY ARTERY OF NATIVE HEART WITHOUT ANGINA PECTORIS: ICD-10-CM

## 2019-08-09 DIAGNOSIS — I10 ESSENTIAL HYPERTENSION, BENIGN: ICD-10-CM

## 2019-08-09 RX ORDER — METOPROLOL TARTRATE 100 MG/1
TABLET ORAL
Qty: 60 TAB | Refills: 3 | Status: SHIPPED | OUTPATIENT
Start: 2019-08-09 | End: 2020-01-09

## 2019-08-22 ENCOUNTER — OFFICE VISIT (OUTPATIENT)
Dept: CARDIOLOGY CLINIC | Age: 69
End: 2019-08-22

## 2019-08-22 VITALS
HEART RATE: 55 BPM | HEIGHT: 70 IN | BODY MASS INDEX: 26.2 KG/M2 | WEIGHT: 183 LBS | SYSTOLIC BLOOD PRESSURE: 144 MMHG | DIASTOLIC BLOOD PRESSURE: 62 MMHG

## 2019-08-22 DIAGNOSIS — Z95.1 POSTSURGICAL AORTOCORONARY BYPASS STATUS: ICD-10-CM

## 2019-08-22 DIAGNOSIS — I25.10 ATHEROSCLEROSIS OF NATIVE CORONARY ARTERY OF NATIVE HEART WITHOUT ANGINA PECTORIS: Primary | ICD-10-CM

## 2019-08-22 DIAGNOSIS — Z95.810 ICD (IMPLANTABLE CARDIOVERTER-DEFIBRILLATOR) IN PLACE: ICD-10-CM

## 2019-08-22 DIAGNOSIS — I10 ESSENTIAL HYPERTENSION, BENIGN: ICD-10-CM

## 2019-08-22 DIAGNOSIS — E78.2 MIXED HYPERLIPIDEMIA: ICD-10-CM

## 2019-08-22 DIAGNOSIS — I48.0 PAROXYSMAL ATRIAL FIBRILLATION (HCC): ICD-10-CM

## 2019-08-22 DIAGNOSIS — Z71.6 TOBACCO ABUSE COUNSELING: ICD-10-CM

## 2019-08-22 RX ORDER — DOXAZOSIN 2 MG/1
2 TABLET ORAL
Qty: 90 TAB | Refills: 1 | Status: SHIPPED | OUTPATIENT
Start: 2019-08-22 | End: 2020-02-10

## 2019-08-22 NOTE — PROGRESS NOTES
1. Have you been to the ER, urgent care clinic since your last visit? Hospitalized since your last visit? No    2. Have you seen or consulted any other health care providers outside of the [address] [place] [address] since your last visit? Include any pap smears or colon screening. Yes Where: PCP     3. Since your last visit, have you had any of the following symptoms? .          4. Have you had any blood work, X-rays or cardiac testing? Yes Where: PCP Reason for visit: Labs        5. Where do you normally have your labs drawn? PCP Office    6. Do you need any refills today?    No

## 2019-08-22 NOTE — PATIENT INSTRUCTIONS
Medications Discontinued During This Encounter   Medication Reason    hydroCHLOROthiazide (HYDRODIURIL) 12.5 mg tablet     doxazosin (CARDURA) 1 mg tablet      After the recommended changes have been made in blood pressure medicines, patient advised to keep BP/HR(pulse rate) chart twice daily and bring us results in next 2 weeks or so. Patient may send the results via \"My Chart\" if desired. Please rest for 5-10 minutes before checking blood pressure       Learning About Benefits From Quitting Smoking  How does quitting smoking make you healthier? If you're thinking about quitting smoking, you may have a few reasons to be smoke-free. Your health may be one of them. · When you quit smoking, you lower your risks for cancer, lung disease, heart attack, stroke, blood vessel disease, and blindness from macular degeneration. · When you're smoke-free, you get sick less often, and you heal faster. You are less likely to get colds, flu, bronchitis, and pneumonia. · As a nonsmoker, you may find that your mood is better and you are less stressed. When and how will you feel healthier? Quitting has real health benefits that start from day 1 of being smoke-free. And the longer you stay smoke-free, the healthier you get and the better you feel. The first hours  · After just 20 minutes, your blood pressure and heart rate go down. That means there's less stress on your heart and blood vessels. · Within 12 hours, the level of carbon monoxide in your blood drops back to normal. That makes room for more oxygen. With more oxygen in your body, you may notice that you have more energy than when you smoked. After 2 weeks  · Your lungs start to work better. · Your risk of heart attack starts to drop. After 1 month  · When your lungs are clear, you cough less and breathe deeper, so it's easier to be active. · Your sense of taste and smell return.  That means you can enjoy food more than you have since you started smoking. Over the years  · After 1 year, your risk of heart disease is half what it would be if you kept smoking. · After 5 years, your risk of stroke starts to shrink. Within a few years after that, it's about the same as if you'd never smoked. · After 10 years, your risk of dying from lung cancer is cut by about half. And your risk for many other types of cancer is lower too. How would quitting help others in your life? When you quit smoking, you improve the health of everyone who now breathes in your smoke. · Their heart, lung, and cancer risks drop, much like yours. · They are sick less. For babies and small children, living smoke-free means they're less likely to have ear infections, pneumonia, and bronchitis. · If you're a woman who is or will be pregnant someday, quitting smoking means a healthier . · Children who are close to you are less likely to become adult smokers. Where can you learn more? Go to http://eric-randee.info/. Enter 052 [id number] [id number] 11 in the search box to learn more about \"Learning About Benefits From Quitting Smoking. \"  Current as of: 2018  Content Version: 12.1  © 6345-7841 [employer]. Care instructions adapted under license by [employer] (which disclaims liability or warranty for this information). If you have questions about a medical condition or this instruction, always ask your healthcare professional. [place] [address] any warranty or liability for your use of this information. Ps[employer] Activation    Thank you for requesting access to [employer]. Please follow the instructions below to securely access and download your online medical record. [employer] allows you to send messages to your doctor, view your test results, renew your prescriptions, schedule appointments, and more. How Do I Sign Up? 1. In your internet browser, go to [email or web address].   2. Click on the First Time User? Click Here link in the Sign In box. You will see the New Member Sign Up page. 3. Enter your [employer] Access Code exactly as it appears below. You will not need to use this code after youve completed the sign-up process. If you do not sign up before the expiration date, you must request a new code. [employer] Access Code: [email or web address]  Expires: 10/6/2019  1:22 PM (This is the date your [employer] access code will )    4. Enter the last four digits of your Social Security Number (xxxx) and Date of Birth (mm/dd/yyyy) as indicated and click Submit. You will be taken to the next sign-up page. 5. Create a [employer]ell ID. This will be your [employer] login ID and cannot be changed, so think of one that is secure and easy to remember. 6. Create a [employer] password. You can change your password at any time. 7. Enter your Password Reset Question and Answer. This can be used at a later time if you forget your password. 8. Enter your e-mail address. You will receive e-mail notification when new information is available in [address] 9. Click Sign Up. You can now view and download portions of your medical record. 10. Click the Download Summary menu link to download a portable copy of your medical information. Additional Information    If you have questions, please visit the Frequently Asked Questions section of the [employer] website at [email or web address] Remember, [employer] is NOT to be used for urgent needs. For medical emergencies, dial 911.

## 2019-08-22 NOTE — PROGRESS NOTES
HISTORY OF PRESENT ILLNESS  Barney Petty is a 77 y.o. female. Patient denies significant chest pain, SOB, palpitations, edema, dizziness      Hypertension   The history is provided by the medical records. This is a chronic problem. Pertinent negatives include no chest pain, no headaches and no shortness of breath. Cholesterol Problem   The history is provided by the medical records. This is a chronic problem. Pertinent negatives include no chest pain, no headaches and no shortness of breath. Review of Systems   Constitutional: Negative for chills, fever, malaise/fatigue and weight loss. HENT: Negative for nosebleeds. Eyes: Negative for discharge. Respiratory: Negative for cough, shortness of breath and wheezing. Cardiovascular: Negative for chest pain, palpitations, orthopnea, claudication, leg swelling and PND. Gastrointestinal: Negative for diarrhea, nausea and vomiting. Genitourinary: Negative for dysuria and hematuria. Musculoskeletal: Negative for joint pain. Skin: Negative for rash. Neurological: Negative for dizziness, seizures, loss of consciousness and headaches. Endo/Heme/Allergies: Negative for polydipsia. Does not bruise/bleed easily. Psychiatric/Behavioral: Negative for depression and substance abuse. The patient does not have insomnia.       Allergies   Allergen Reactions    Losartan Angioedema     Tongue swelling but not on lips       Past Medical History:   Diagnosis Date    Arthritis     Automatic implantable cardiac defibrillator in situ     Coronary atherosclerosis of native coronary artery     CABG 10/02 with LIMA to LAD and SVG to RCA    Depression     Essential hypertension     Headache(784.0)     Other and unspecified hyperlipidemia     9/12 Low density lipoproteins are at goal, triglycerides are at goal, high density lipoproteins are at goal.    Postsurgical aortocoronary bypass status 2002    Presence of permanent cardiac pacemaker-DDD 11/6/2015    OFFICE CHECK : 8/14 nl function, +AHR rare(changed detection to over 150/mt); 11/15 nl function     Unspecified arthropathy, lower leg     S/P Left knee replacement. Had repeat surgery's for her left knee.  UTI (urinary tract infection)        Family History   Problem Relation Age of Onset    Arthritis-osteo Other         not specified    Hypertension Other         not specified    Sudden Death Neg Hx     Heart Attack Neg Hx        Social History     Tobacco Use    Smoking status: Current Some Day Smoker     Packs/day: 0.05    Smokeless tobacco: Never Used    Tobacco comment:  1 or 2 puff at night before bed   Substance Use Topics    Alcohol use: No    Drug use: Not on file        Current Outpatient Medications   Medication Sig    metoprolol tartrate (LOPRESSOR) 100 mg IR tablet take 1 tablet by mouth twice a day    ELIQUIS 5 mg tablet take 1 tablet by mouth twice a day    doxazosin (CARDURA) 1 mg tablet take 1 tablet by mouth at bedtime    acetaminophen (TYLENOL) 500 mg tablet Take  by mouth every six (6) hours as needed for Pain.  ezetimibe (ZETIA) 10 mg tablet Take  by mouth daily.  rosuvastatin (CRESTOR) 40 mg tablet Take 1 Tab by mouth nightly.  amLODIPine (NORVASC) 10 mg tablet Take 10 mg by mouth daily. No current facility-administered medications for this visit. Past Surgical History:   Procedure Laterality Date    CARDIAC SURG PROCEDURE UNLIST  11/09    gen change-obici    HX CORONARY ARTERY BYPASS GRAFT  10/2002    HX ORTHOPAEDIC  [id number]    Reduced two component revision using the Sigma System. Lateral release. Suture anchor repair of medial collateral ligament. [facility]  [id number]    Left knee arthrofibrosis, status post significant scarring, status post left total knee replacement    VASCULAR SURGERY PROCEDURE UNLIST  2005    Aorto biffem.   Dr. Kay Mccartney       Diagnostic Studies:  CARDIOLOGY STUDIES 10/5/2017   Pharmacological Nuclear Stress Test Result mild small infbasal ischemia, 73%EF   Some recent data might be hidden       Visit Vitals  /62   Pulse (!) 55   Ht 5' 10\" (1.778 m)   Wt 83 kg (183 lb)   BMI 26.26 kg/m²       Ms. Andria Jones has a reminder for a \"due or due soon\" health maintenance. I have asked that she contact her primary care provider for follow-up on this health maintenance. Physical Exam   Constitutional: She is oriented to person, place, and time. She appears well-developed and well-nourished. No distress. kyphosis   HENT:   Head: Normocephalic and atraumatic. Eyes: Right eye exhibits no discharge. Left eye exhibits no discharge. No scleral icterus. Neck: Neck supple. No JVD present. Carotid bruit is not present. No thyromegaly present. Cardiovascular: Normal rate, regular rhythm, S1 normal, S2 normal, normal heart sounds and intact distal pulses. Exam reveals no gallop and no friction rub. No murmur heard. Pulmonary/Chest: Effort normal and breath sounds normal. She has no wheezes. She has no rales. Abdominal: Soft. She exhibits no mass. There is no tenderness. Musculoskeletal: She exhibits no edema. Cant put legs straight   Neurological: She is alert and oriented to person, place, and time. Skin: Skin is warm and dry. No rash noted. Psychiatric: She has a normal mood and affect.  Her behavior is normal.   wounds healing well left subclavicular area     ASSESSMENT and PLAN      AFib / Flutter seen on interrogation of ICD  Started Eliquis 5 mg / BID  HLD: 5/17 LDL69; 2/16 LDL81;   LIPID COMPLETE PANELResulted: 5/9/2019 11:26 PM  [address]  Component Name Value Ref Range   Cholesterol 138 110 - 200 mg/dL   Triglyceride 98 40 - 149 mg/dL   HDL 48 40 - 59 mg/dL   Cholesterol/HDL 2.9 0.0 - 5.0    LDL CALCULATION 71 50 - 99 mg/dL   VLDL CALCULATION 20 8 - 30 mg/dL       BiV ICD: gen change 11/09; 3/17  CARE LINK 10/10; 8/12; 4/13; 8/13; 2/16; 5/16; 8/16  nl function, nl volume; 2/17 nl function; DONNY; 8/17; 1/19 nl function, nl volume  OFFICE CHECK : 8/14 nl function, +AHR rare(changed detection to over 150/mt); 11/15; 11/16; 3/17 nl function, nl vol; 4/18 nl function, frequent AHR(likely AFlutter), nl volume; Normal function, normal volume, AT/AF episodes are far field sensing events. Increase Cardura to 2 mg at bedtime for better blood pressure control. Follow-up home chart. Continue Eliquis due to frequent AT/A. fib on ICD interrogation. Stable CAD. Continue present medications. Patient trying to stop smoking and she was congratulated for that. Hopefully she will discontinue very soon. Diagnoses and all orders for this visit:    1. Atherosclerosis of native coronary artery of native heart without angina pectoris    2. Essential hypertension, benign  -     doxazosin (CARDURA) 2 mg tablet; Take 1 Tab by mouth nightly. 3. Paroxysmal atrial fibrillation (HCC)    4. ICD (implantable cardioverter-defibrillator) in place-DDD ICD    5. Mixed hyperlipidemia    6. Postsurgical aortocoronary bypass status    7. Tobacco abuse counseling        Pertinent laboratory and test data reviewed and discussed with patient. See patient instructions also for other medical advice given    Medications Discontinued During This Encounter   Medication Reason    hydroCHLOROthiazide (HYDRODIURIL) 12.5 mg tablet     doxazosin (CARDURA) 1 mg tablet        Follow-up and Dispositions    · Return in about 6 months (around 2/22/2020), or if symptoms worsen or fail to improve.

## 2020-01-09 DIAGNOSIS — I25.10 ATHEROSCLEROSIS OF NATIVE CORONARY ARTERY OF NATIVE HEART WITHOUT ANGINA PECTORIS: ICD-10-CM

## 2020-01-09 DIAGNOSIS — I10 ESSENTIAL HYPERTENSION, BENIGN: ICD-10-CM

## 2020-01-09 RX ORDER — METOPROLOL TARTRATE 100 MG/1
TABLET ORAL
Qty: 60 TAB | Refills: 3 | Status: SHIPPED | OUTPATIENT
Start: 2020-01-09 | End: 2020-04-05

## 2020-02-06 LAB — LDL-C, EXTERNAL: 59

## 2020-02-09 DIAGNOSIS — I10 ESSENTIAL HYPERTENSION, BENIGN: ICD-10-CM

## 2020-02-10 RX ORDER — DOXAZOSIN 2 MG/1
TABLET ORAL
Qty: 90 TAB | Refills: 1 | Status: SHIPPED | OUTPATIENT
Start: 2020-02-10 | End: 2020-02-17

## 2020-02-17 ENCOUNTER — OFFICE VISIT (OUTPATIENT)
Dept: CARDIOLOGY CLINIC | Age: 70
End: 2020-02-17

## 2020-02-17 VITALS
OXYGEN SATURATION: 100 % | WEIGHT: 190 LBS | HEIGHT: 70 IN | SYSTOLIC BLOOD PRESSURE: 143 MMHG | BODY MASS INDEX: 27.2 KG/M2 | DIASTOLIC BLOOD PRESSURE: 59 MMHG | HEART RATE: 52 BPM

## 2020-02-17 DIAGNOSIS — I25.10 ATHEROSCLEROSIS OF NATIVE CORONARY ARTERY OF NATIVE HEART WITHOUT ANGINA PECTORIS: Primary | ICD-10-CM

## 2020-02-17 DIAGNOSIS — E78.2 MIXED HYPERLIPIDEMIA: ICD-10-CM

## 2020-02-17 DIAGNOSIS — I10 ESSENTIAL HYPERTENSION, BENIGN: ICD-10-CM

## 2020-02-17 DIAGNOSIS — Z71.6 TOBACCO ABUSE COUNSELING: ICD-10-CM

## 2020-02-17 DIAGNOSIS — Z95.1 POSTSURGICAL AORTOCORONARY BYPASS STATUS: ICD-10-CM

## 2020-02-17 DIAGNOSIS — I48.0 PAROXYSMAL ATRIAL FIBRILLATION (HCC): ICD-10-CM

## 2020-02-17 DIAGNOSIS — Z95.810 ICD (IMPLANTABLE CARDIOVERTER-DEFIBRILLATOR) IN PLACE: ICD-10-CM

## 2020-02-17 RX ORDER — BISMUTH SUBSALICYLATE 262 MG
1 TABLET,CHEWABLE ORAL DAILY
COMMUNITY

## 2020-02-17 RX ORDER — MAGNESIUM 200 MG
1000 TABLET ORAL
COMMUNITY

## 2020-02-17 RX ORDER — DOXAZOSIN 4 MG/1
4 TABLET ORAL
Qty: 90 TAB | Refills: 2 | Status: SHIPPED | OUTPATIENT
Start: 2020-02-17 | End: 2020-08-03 | Stop reason: SDUPTHER

## 2020-02-17 NOTE — PROGRESS NOTES
1. Have you been to the ER, urgent care clinic since your last visit? Hospitalized since your last visit? No    2. Have you seen or consulted any other health care providers outside of the [address] [place] [address] since your last visit? Include any pap smears or colon screening. Yes When: lasrt week     3. Since your last visit, have you had any of the following symptoms? No    4. Have you had any blood work, X-rays or cardiac testing? Yes When: last week Where: pcp Reason for visit: follow up     Requested: YES     In [facility]:  Care everywhere    5. Where do you normally have your labs drawn?   [facility]    6. Do you need any refills today?    no

## 2020-02-17 NOTE — PROGRESS NOTES
HISTORY OF PRESENT ILLNESS  Greg Owusu is a 68 y.o. female. Patient denies significant chest pain, SOB, palpitations, edema, dizziness      Cholesterol Problem   The history is provided by the medical records. This is a chronic problem. Pertinent negatives include no chest pain, no headaches and no shortness of breath. Hypertension   The history is provided by the medical records. This is a chronic problem. Pertinent negatives include no chest pain, no headaches and no shortness of breath. Review of Systems   Constitutional: Negative for chills, fever, malaise/fatigue and weight loss. HENT: Negative for nosebleeds. Eyes: Negative for discharge. Respiratory: Negative for cough, shortness of breath and wheezing. Cardiovascular: Negative for chest pain, palpitations, orthopnea, claudication, leg swelling and PND. Gastrointestinal: Negative for diarrhea, nausea and vomiting. Genitourinary: Negative for dysuria and hematuria. Musculoskeletal: Negative for joint pain. Skin: Negative for rash. Neurological: Negative for dizziness, seizures, loss of consciousness and headaches. Endo/Heme/Allergies: Negative for polydipsia. Does not bruise/bleed easily. Psychiatric/Behavioral: Negative for depression and substance abuse. The patient does not have insomnia.       Allergies   Allergen Reactions    Losartan Angioedema     Tongue swelling but not on lips       Past Medical History:   Diagnosis Date    Arthritis     Automatic implantable cardiac defibrillator in situ     Coronary atherosclerosis of native coronary artery     CABG 10/02 with LIMA to LAD and SVG to RCA    Depression     Essential hypertension     Headache(784.0)     Other and unspecified hyperlipidemia     9/12 Low density lipoproteins are at goal, triglycerides are at goal, high density lipoproteins are at goal.    Postsurgical aortocoronary bypass status 2002    Presence of permanent cardiac pacemaker-DDD 11/6/2015    OFFICE CHECK : 8/14 nl function, +AHR rare(changed detection to over 150/mt); 11/15 nl function     Unspecified arthropathy, lower leg     S/P Left knee replacement. Had repeat surgery's for her left knee.  UTI (urinary tract infection)        Family History   Problem Relation Age of Onset    Arthritis-osteo Other         not specified    Hypertension Other         not specified    Sudden Death Neg Hx     Heart Attack Neg Hx        Social History     Tobacco Use    Smoking status: Current Some Day Smoker     Packs/day: 0.05    Smokeless tobacco: Never Used    Tobacco comment:  1 or 2 puff at night before bed   Substance Use Topics    Alcohol use: No    Drug use: Not on file        Current Outpatient Medications   Medication Sig    multivitamin (ONE A DAY) tablet Take 1 Tab by mouth daily.  cyanocobalamin (VITAMIN B-12) 1,000 mcg sublingual tablet Take 1,000 mcg by mouth daily.  doxazosin (CARDURA) 2 mg tablet take 1 tablet by mouth NIGHTLY    metoprolol tartrate (LOPRESSOR) 100 mg IR tablet take 1 tablet by mouth twice a day    ELIQUIS 5 mg tablet take 1 tablet by mouth twice a day    acetaminophen (TYLENOL) 500 mg tablet Take  by mouth every six (6) hours as needed for Pain.  ezetimibe (ZETIA) 10 mg tablet Take  by mouth daily.  rosuvastatin (CRESTOR) 40 mg tablet Take 1 Tab by mouth nightly.  amLODIPine (NORVASC) 10 mg tablet Take 10 mg by mouth daily. No current facility-administered medications for this visit. Past Surgical History:   Procedure Laterality Date    CARDIAC SURG PROCEDURE UNLIST  11/09    gen change-obici    HX CORONARY ARTERY BYPASS GRAFT  10/2002    HX ORTHOPAEDIC  [id number]    Reduced two component revision using the Sigma System. Lateral release. Suture anchor repair of medial collateral ligament.    [facility] ORTHOPAEDIC  [id number]    Left knee arthrofibrosis, status post significant scarring, status post left total knee replacement    VASCULAR SURGERY PROCEDURE UNLIST  2005    Aorto biffem. Dr. Maciel Do       Diagnostic Studies:  CARDIOLOGY STUDIES 10/5/2017   Pharmacological Nuclear Stress Test Result mild small infbasal ischemia, 73%EF   Some recent data might be hidden       Visit Vitals  /59 (BP 1 Location: Right arm, BP Patient Position: Sitting)   Pulse (!) 52   Ht 5' 10\" (1.778 m)   Wt 86.2 kg (190 lb)   SpO2 100%   BMI 27.26 kg/m²       Ms. Cain Mayer has a reminder for a \"due or due soon\" health maintenance. I have asked that she contact her primary care provider for follow-up on this health maintenance. Physical Exam   Constitutional: She is oriented to person, place, and time. She appears well-developed and well-nourished. No distress. kyphosis   HENT:   Head: Normocephalic and atraumatic. Eyes: Right eye exhibits no discharge. Left eye exhibits no discharge. No scleral icterus. Neck: Neck supple. No JVD present. Carotid bruit is not present. No thyromegaly present. Cardiovascular: Normal rate, regular rhythm, S1 normal, S2 normal, normal heart sounds and intact distal pulses. Exam reveals no gallop and no friction rub. No murmur heard. Pulmonary/Chest: Effort normal and breath sounds normal. She has no wheezes. She has no rales. Abdominal: Soft. She exhibits no mass. There is no abdominal tenderness. Musculoskeletal:         General: No edema. Comments: Cant put legs straight   Neurological: She is alert and oriented to person, place, and time. Skin: Skin is warm and dry. No rash noted. Psychiatric: She has a normal mood and affect.  Her behavior is normal.   wounds healing well left subclavicular area     ASSESSMENT and PLAN      AFib / Flutter seen on interrogation of ICD  Started Eliquis 5 mg / BID  HLD: 5/17 LDL69; 2/16 LDL81;   LIPID COMPLETE PANELResulted: 2/7/2020 4:51 AM  Startlocal  Component Name Value Ref Range   Cholesterol 129 110 - 200 mg/dL   Triglyceride 73 40 - 149 mg/dL   HDL 55 >=40 mg/dL   Cholesterol/HDL 2.3 0.0 - 5.0    Non-HDL Cholesterol 74 <130 mg/dL   LDL CALCULATION 59 50 - 99 mg/dL   VLDL CALCULATION 15 8 - 30 mg/dL   LDL/HDL Ratio 1.1           BiV ICD: gen change 11/09; 3/17  CARE LINK 10/10; 8/12; 4/13; 8/13; 2/16; 5/16; 8/16  nl function, nl volume; 2/17 nl function; DONNY; 8/17; 1/19; 11/19 nl function, nl volume  OFFICE CHECK : 8/14 nl function, +AHR rare(changed detection to over 150/mt); 11/15; 11/16; 3/17 nl function, nl vol; 4/18 nl function, frequent AHR(likely AFlutter), nl volume; Normal function, normal volume, AT/AF episodes are far field sensing events. Increase Cardura to 4 mg at bedtime for better blood pressure control. Follow-up home chart. Continue Eliquis due to frequent AT/A. fib on ICD interrogation. Follow-up [facility] every 3 months. Pending now any day. Stable CAD. Continue present medications. Patient trying to stop smoking and she was congratulated for that. Hopefully she will discontinue very soon. Diagnoses and all orders for this visit:    1. Atherosclerosis of native coronary artery of native heart without angina pectoris    2. Paroxysmal atrial fibrillation (HCC)    3. Essential hypertension, benign  -     doxazosin (CARDURA) 4 mg tablet; Take 1 Tab by mouth nightly. 4. ICD (implantable cardioverter-defibrillator) in place-DDD ICD    5. Mixed hyperlipidemia    6. Postsurgical aortocoronary bypass status    7. Tobacco abuse counseling        Pertinent laboratory and test data reviewed and discussed with patient. See patient instructions also for other medical advice given    Medications Discontinued During This Encounter   Medication Reason    doxazosin (CARDURA) 2 mg tablet        Follow-up and Dispositions    · Return in about 6 months (around 8/17/2020), or if symptoms worsen or fail to improve.

## 2020-02-17 NOTE — PATIENT INSTRUCTIONS
Medications Discontinued During This Encounter Medication Reason  doxazosin (CARDURA) 2 mg tablet After the recommended changes have been made in blood pressure medicines, patient advised to keep BP/HR(pulse rate) chart twice daily and bring us results in next 2 weeks or so. Patient may send the results via \"My Chart\" if desired. Please rest for 5-10 minutes before checking blood pressure 
 
 please get remote checks for pacer or ICD every 3 months and Office check in 1 yr Please call our office after every transmission to confirm success of transmission High Blood Pressure: Care Instructions Overview It's normal for blood pressure to go up and down throughout the day. But if it stays up, you have high blood pressure. Another name for high blood pressure is hypertension. Despite what a lot of people think, high blood pressure usually doesn't cause headaches or make you feel dizzy or lightheaded. It usually has no symptoms. But it does increase your risk of stroke, heart attack, and other problems. You and your doctor will talk about your risks of these problems based on your blood pressure. Your doctor will give you a goal for your blood pressure. Your goal will be based on your health and your age. Lifestyle changes, such as eating healthy and being active, are always important to help lower blood pressure. You might also take medicine to reach your blood pressure goal. 
Follow-up care is a key part of your treatment and safety. Be sure to make and go to all appointments, and call your doctor if you are having problems. It's also a good idea to know your test results and keep a list of the medicines you take. How can you care for yourself at home? Medical treatment · If you stop taking your medicine, your blood pressure will go back up. You may take one or more types of medicine to lower your blood pressure. Be safe with medicines. Take your medicine exactly as prescribed.  Call your doctor if you think you are having a problem with your medicine. · Talk to your doctor before you start taking aspirin every day. Aspirin can help certain people lower their risk of a heart attack or stroke. But taking aspirin isn't right for everyone, because it can cause serious bleeding. · See your doctor regularly. You may need to see the doctor more often at first or until your blood pressure comes down. · If you are taking blood pressure medicine, talk to your doctor before you take decongestants or anti-inflammatory medicine, such as ibuprofen. Some of these medicines can raise blood pressure. · Learn how to check your blood pressure at home. Lifestyle changes · Stay at a healthy weight. This is especially important if you put on weight around the waist. Losing even 10 pounds can help you lower your blood pressure. · If your doctor recommends it, get more exercise. Walking is a good choice. Bit by bit, increase the amount you walk every day. Try for at least 30 minutes on most days of the week. You also may want to swim, bike, or do other activities. · Avoid or limit alcohol. Talk to your doctor about whether you can drink any alcohol. · Try to limit how much sodium you eat to less than 2,300 milligrams (mg) a day. Your doctor may ask you to try to eat less than 1,500 mg a day. · Eat plenty of fruits (such as bananas and oranges), vegetables, legumes, whole grains, and low-fat dairy products. · Lower the amount of saturated fat in your diet. Saturated fat is found in animal products such as milk, cheese, and meat. Limiting these foods may help you lose weight and also lower your risk for heart disease. · Do not smoke. Smoking increases your risk for heart attack and stroke. If you need help quitting, talk to your doctor about stop-smoking programs and medicines. These can increase your chances of quitting for good. When should you call for help? Call  911 anytime you think you may need emergency care. This may mean having symptoms that suggest that your blood pressure is causing a serious heart or blood vessel problem. Your blood pressure may be over 180/120. 
 For example, call  911 if: 
  · You have symptoms of a heart attack. These may include: 
? Chest pain or pressure, or a strange feeling in the chest. 
? Sweating. ? Shortness of breath. ? Nausea or vomiting. ? Pain, pressure, or a strange feeling in the back, neck, jaw, or upper belly or in one or both shoulders or arms. ? Lightheadedness or sudden weakness. ? A fast or irregular heartbeat.  
  · You have symptoms of a stroke. These may include: 
? Sudden numbness, tingling, weakness, or loss of movement in your face, arm, or leg, especially on only one side of your body. ? Sudden vision changes. ? Sudden trouble speaking. ? Sudden confusion or trouble understanding simple statements. ? Sudden problems with walking or balance. ? A sudden, severe headache that is different from past headaches.  
  · You have severe back or belly pain.  
 Do not wait until your blood pressure comes down on its own. Get help right away. 
 Call your doctor now or seek immediate care if: 
  · Your blood pressure is much higher than normal (such as 180/120 or higher), but you don't have symptoms.  
  · You think high blood pressure is causing symptoms, such as: 
? Severe headache. 
? Blurry vision.  
 Watch closely for changes in your health, and be sure to contact your doctor if: 
  · Your blood pressure measures higher than your doctor recommends at least 2 times. That means the top number is higher or the bottom number is higher, or both.  
  · You think you may be having side effects from your blood pressure medicine. Where can you learn more? Go to http://eric-randee.info/. Enter D802 in the search box to learn more about \"High Blood Pressure: Care Instructions. \" 
 Current as of: April 9, 2019 Content Version: 12.2 © 3817-4184 [employer]. Care instructions adapted under license by [employer] (which disclaims liability or warranty for this information). If you have questions about a medical condition or this instruction, always ask your healthcare professional. [address] any warranty or liability for your use of this information.

## 2020-03-26 ENCOUNTER — TELEPHONE (OUTPATIENT)
Dept: CARDIOLOGY CLINIC | Age: 70
End: 2020-03-26

## 2020-03-26 NOTE — TELEPHONE ENCOUNTER
If there are no significant symptoms and blood pressure is less than 140/90, she is cleared with moderate risk. Please check symptoms and blood pressures at home with her.

## 2020-03-26 NOTE — TELEPHONE ENCOUNTER
Patient needs cardiac clearance for Left Leg Revasc Surgery w. Dr. Melissa Contreras office. Can patient be cleared based on last office visit 02/17/20? Last pacer check done 02/18/20. Please advise.

## 2020-03-26 NOTE — LETTER
3/26/2020 Dear Yvonne Tucker: 
 
Re: Elizabeth Bustillos : 1950 Ms. Ramesh Hidalgo is cleared from a cardiac standpoint with moderate risk for left leg revasc surgery. If you have any questions or any further assistance is needed please contact our office. Sincerely, Evin Reyes M.D. 
 
cc:  Jhonny Teixeira MD  
 
 
 
 
 
Sincerely, Wesly Stoddard MD

## 2020-03-26 NOTE — TELEPHONE ENCOUNTER
Called Dr. Selene Maradiaga office per patient to get latest blood pressure reading from 03/24/2020 BP was 122/60. Patient states no significant symptoms.

## 2020-04-05 DIAGNOSIS — I10 ESSENTIAL HYPERTENSION, BENIGN: ICD-10-CM

## 2020-04-05 DIAGNOSIS — I25.10 ATHEROSCLEROSIS OF NATIVE CORONARY ARTERY OF NATIVE HEART WITHOUT ANGINA PECTORIS: ICD-10-CM

## 2020-04-05 RX ORDER — METOPROLOL TARTRATE 100 MG/1
TABLET ORAL
Qty: 180 TAB | Refills: 1 | Status: SHIPPED | OUTPATIENT
Start: 2020-04-05 | End: 2020-10-01 | Stop reason: SDUPTHER

## 2020-06-01 ENCOUNTER — OFFICE VISIT (OUTPATIENT)
Dept: CARDIOLOGY CLINIC | Age: 70
End: 2020-06-01

## 2020-06-01 VITALS
HEIGHT: 70 IN | TEMPERATURE: 97.3 F | DIASTOLIC BLOOD PRESSURE: 77 MMHG | BODY MASS INDEX: 27.49 KG/M2 | OXYGEN SATURATION: 100 % | HEART RATE: 62 BPM | WEIGHT: 192 LBS | SYSTOLIC BLOOD PRESSURE: 137 MMHG

## 2020-06-01 DIAGNOSIS — Z95.1 POSTSURGICAL AORTOCORONARY BYPASS STATUS: ICD-10-CM

## 2020-06-01 DIAGNOSIS — I10 ESSENTIAL HYPERTENSION, BENIGN: ICD-10-CM

## 2020-06-01 DIAGNOSIS — I25.10 ATHEROSCLEROSIS OF NATIVE CORONARY ARTERY OF NATIVE HEART WITHOUT ANGINA PECTORIS: Primary | ICD-10-CM

## 2020-06-01 DIAGNOSIS — Z95.810 ICD (IMPLANTABLE CARDIOVERTER-DEFIBRILLATOR) IN PLACE: ICD-10-CM

## 2020-06-01 DIAGNOSIS — Z71.6 TOBACCO ABUSE COUNSELING: ICD-10-CM

## 2020-06-01 DIAGNOSIS — I48.0 PAROXYSMAL ATRIAL FIBRILLATION (HCC): ICD-10-CM

## 2020-06-01 DIAGNOSIS — E78.2 MIXED HYPERLIPIDEMIA: ICD-10-CM

## 2020-06-01 RX ORDER — FUROSEMIDE 20 MG/1
20 TABLET ORAL
Qty: 30 TAB | Refills: 0 | Status: SHIPPED | OUTPATIENT
Start: 2020-06-01 | End: 2020-06-22

## 2020-06-01 NOTE — PATIENT INSTRUCTIONS
There are no discontinued medications. Body Mass Index: Care Instructions Your Care Instructions Body mass index (BMI) can help you see if your weight is raising your risk for health problems. It uses a formula to compare how much you weigh with how tall you are. · A BMI lower than 18.5 is considered underweight. · A BMI between 18.5 and 24.9 is considered healthy. · A BMI between 25 and 29.9 is considered overweight. A BMI of 30 or higher is considered obese. If your BMI is in the normal range, it means that you have a lower risk for weight-related health problems. If your BMI is in the overweight or obese range, you may be at increased risk for weight-related health problems, such as high blood pressure, heart disease, stroke, arthritis or joint pain, and diabetes. If your BMI is in the underweight range, you may be at increased risk for health problems such as fatigue, lower protection (immunity) against illness, muscle loss, bone loss, hair loss, and hormone problems. BMI is just one measure of your risk for weight-related health problems. You may be at higher risk for health problems if you are not active, you eat an unhealthy diet, or you drink too much alcohol or use tobacco products. Follow-up care is a key part of your treatment and safety. Be sure to make and go to all appointments, and call your doctor if you are having problems. It's also a good idea to know your test results and keep a list of the medicines you take. How can you care for yourself at home? · Practice healthy eating habits. This includes eating plenty of fruits, vegetables, whole grains, lean protein, and low-fat dairy. · If your doctor recommends it, get more exercise. Walking is a good choice. Bit by bit, increase the amount you walk every day. Try for at least 30 minutes on most days of the week. · Do not smoke. Smoking can increase your risk for health problems.  If you need help quitting, talk to your doctor about stop-smoking programs and medicines. These can increase your chances of quitting for good. · Limit alcohol to 2 drinks a day for men and 1 drink a day for women. Too much alcohol can cause health problems. If you have a BMI higher than 25 · Your doctor may do other tests to check your risk for weight-related health problems. This may include measuring the distance around your waist. A waist measurement of more than 40 inches in men or 35 inches in women can increase the risk of weight-related health problems. · Talk with your doctor about steps you can take to stay healthy or improve your health. You may need to make lifestyle changes to lose weight and stay healthy, such as changing your diet and getting regular exercise. If you have a BMI lower than 18.5 · Your doctor may do other tests to check your risk for health problems. · Talk with your doctor about steps you can take to stay healthy or improve your health. You may need to make lifestyle changes to gain or maintain weight and stay healthy, such as getting more healthy foods in your diet and doing exercises to build muscle. Where can you learn more? Go to http://eric-randee.info/ Enter S176 in the search box to learn more about \"Body Mass Index: Care Instructions. \" Current as of: December 11, 2019               Content Version: 12.5 © 1282-2912 [employer]. Care instructions adapted under license by [employer] (which disclaims liability or warranty for this information). If you have questions about a medical condition or this instruction, always ask your healthcare professional. [place] [address] any warranty or liability for your use of this information.

## 2020-06-01 NOTE — PROGRESS NOTES
1. Have you been to the ER, urgent care clinic since your last visit? Hospitalized since your last visit?     no  2. Have you seen or consulted any other health care providers outside of the [address] [place] [address] since your last visit? Include any pap smears or colon screening. Yes When: x 1 month ago  Where: [facility]  Reason for visit: Dr. Marilee Wu      3. Since your last visit, have you had any of the following symptoms?      no    4. Have you had any blood work, X-rays or cardiac testing? No    5. Where do you normally have your labs drawn? [facility]    6. Do you need any refills today?    no

## 2020-06-01 NOTE — PROGRESS NOTES
HISTORY OF PRESENT ILLNESS  Kamla Christie is a 68 y.o. female. Patient denies significant chest pain, SOB, palpitations, edema, dizziness      Cholesterol Problem   The history is provided by the medical records. This is a chronic problem. Pertinent negatives include no chest pain, no headaches and no shortness of breath. Hypertension   The history is provided by the medical records. This is a chronic problem. Pertinent negatives include no chest pain, no headaches and no shortness of breath. Palpitations    The history is provided by the medical records and patient (PAF on ICD interrogation). Pertinent negatives include no fever, no malaise/fatigue, no chest pain, no claudication, no orthopnea, no PND, no nausea, no vomiting, no headaches, no dizziness, no cough and no shortness of breath. Her past medical history is significant for hypertension. Review of Systems   Constitutional: Negative for chills, fever, malaise/fatigue and weight loss. HENT: Negative for nosebleeds. Eyes: Negative for discharge. Respiratory: Negative for cough, shortness of breath and wheezing. Cardiovascular: Positive for palpitations (PAF). Negative for chest pain, orthopnea, claudication, leg swelling and PND. Gastrointestinal: Negative for diarrhea, nausea and vomiting. Genitourinary: Negative for dysuria and hematuria. Musculoskeletal: Negative for joint pain. Skin: Negative for rash. Neurological: Negative for dizziness, seizures, loss of consciousness and headaches. Endo/Heme/Allergies: Negative for polydipsia. Does not bruise/bleed easily. Psychiatric/Behavioral: Negative for depression and substance abuse. The patient does not have insomnia.       Allergies   Allergen Reactions    Losartan Angioedema     Tongue swelling but not on lips       Past Medical History:   Diagnosis Date    Arthritis     Automatic implantable cardiac defibrillator in situ     Coronary atherosclerosis of native coronary artery     CABG 10/02 with LIMA to LAD and SVG to RCA    Depression     Essential hypertension     Headache(784.0)     Other and unspecified hyperlipidemia     9/12 Low density lipoproteins are at goal, triglycerides are at goal, high density lipoproteins are at goal.    Postsurgical aortocoronary bypass status 2002    Presence of permanent cardiac pacemaker-DDD 11/6/2015    OFFICE CHECK : 8/14 nl function, +AHR rare(changed detection to over 150/mt); 11/15 nl function     Unspecified arthropathy, lower leg     S/P Left knee replacement. Had repeat surgery's for her left knee.  UTI (urinary tract infection)        Family History   Problem Relation Age of Onset    Arthritis-osteo Other         not specified    Hypertension Other         not specified    Sudden Death Neg Hx     Heart Attack Neg Hx        Social History     Tobacco Use    Smoking status: Current Some Day Smoker     Packs/day: 0.05    Smokeless tobacco: Never Used    Tobacco comment:  1 or 2 puff at night before bed   Substance Use Topics    Alcohol use: No    Drug use: Not on file        Current Outpatient Medications   Medication Sig    metoprolol tartrate (LOPRESSOR) 100 mg IR tablet take 1 tablet by mouth twice a day    multivitamin (ONE A DAY) tablet Take 1 Tab by mouth daily.  cyanocobalamin (VITAMIN B-12) 1,000 mcg sublingual tablet Take 1,000 mcg by mouth daily.  doxazosin (CARDURA) 4 mg tablet Take 1 Tab by mouth nightly.  ELIQUIS 5 mg tablet take 1 tablet by mouth twice a day    acetaminophen (TYLENOL) 500 mg tablet Take  by mouth every six (6) hours as needed for Pain.  ezetimibe (ZETIA) 10 mg tablet Take  by mouth daily.  rosuvastatin (CRESTOR) 40 mg tablet Take 1 Tab by mouth nightly.  amLODIPine (NORVASC) 10 mg tablet Take 10 mg by mouth daily. No current facility-administered medications for this visit.          Past Surgical History:   Procedure Laterality Date    CARDIAC SURG PROCEDURE UNLIST  11/09    gen change-obici    HX CORONARY ARTERY BYPASS GRAFT  10/2002    HX ORTHOPAEDIC  [id number]    Reduced two component revision using the Sigma System. Lateral release. Suture anchor repair of medial collateral ligament. Alexey Ramos  [id number]    Left knee arthrofibrosis, status post significant scarring, status post left total knee replacement    VASCULAR SURGERY PROCEDURE UNLIST  2005    Aorto biffem. Dr. Niko Gandhi       Diagnostic Studies:  CARDIOLOGY STUDIES 10/5/2017   Pharmacological Nuclear Stress Test Result mild small infbasal ischemia, 73%EF   Some recent data might be hidden       Visit Vitals  /77 (BP 1 Location: Left arm, BP Patient Position: Sitting)   Pulse 62   Temp 97.3 °F (36.3 °C) (Temporal)   Ht 5' 10\" (1.778 m)   Wt 87.1 kg (192 lb)   SpO2 100%   BMI 27.55 kg/m²       Ms. Alison Thomas has a reminder for a \"due or due soon\" health maintenance. I have asked that she contact her primary care provider for follow-up on this health maintenance. Physical Exam   Constitutional: She is oriented to person, place, and time. She appears well-developed and well-nourished. No distress. kyphosis   HENT:   Head: Normocephalic and atraumatic. Eyes: Right eye exhibits no discharge. Left eye exhibits no discharge. No scleral icterus. Neck: Neck supple. No JVD present. Carotid bruit is not present. No thyromegaly present. Cardiovascular: Normal rate, regular rhythm, S1 normal, S2 normal, normal heart sounds and intact distal pulses. Exam reveals no gallop and no friction rub. No murmur heard. Pulmonary/Chest: Effort normal and breath sounds normal. She has no wheezes. She has no rales. Abdominal: Soft. She exhibits no mass. There is no abdominal tenderness. Musculoskeletal:         General: Edema (mild rt leg) present. Comments: Cant put legs straight   Neurological: She is alert and oriented to person, place, and time. Skin: Skin is warm and dry. No rash noted. Psychiatric: She has a normal mood and affect. Her behavior is normal.   wounds healing well left subclavicular area     ASSESSMENT and PLAN      AFib / Flutter seen on interrogation of ICD  Started Eliquis 5 mg / BID  HLD: 5/17 LDL69; 2/16 LDL81;   LIPID COMPLETE PANELResulted: 2/7/2020 4:51 AM  [employer]  Component Name Value Ref Range   Cholesterol 129 110 - 200 mg/dL   Triglyceride 73 40 - 149 mg/dL   HDL 55 >=40 mg/dL   Cholesterol/HDL 2.3 0.0 - 5.0    Non-HDL Cholesterol 74 <130 mg/dL   LDL CALCULATION 59 50 - 99 mg/dL   VLDL CALCULATION 15 8 - 30 mg/dL   LDL/HDL Ratio 1.1           BiV ICD: gen change 11/09; 3/17  CARE LINK 10/10; 8/12; 4/13; 8/13; 2/16; 5/16; 8/16  nl function, nl volume; 2/17 nl function; DONNY; 8/17; 1/19; 11/19 nl function, nl volume; 2/20 normal function, normal volume, PAF;  OFFICE CHECK : 8/14 nl function, +AHR rare(changed detection to over 150/mt); 11/15; 11/16; 3/17 nl function, nl vol; 4/18 nl function, frequent AHR(likely AFlutter), nl volume; Normal function, normal volume, AT/AF episodes are far field sensing events. Blood pressure is controlled now  Continue Eliquis due to frequent AT/A. fib on ICD interrogation. Follow[facility]up [facility] every 3 months. Office check in 6 months  Stable CAD. Continue present medications. She has developed mild edema on the right leg but recently had a lot of stress and likely dietary indiscretion due to sister in the hospital and eventually dying in the hospital.  Patient trying to stop smoking and she was congratulated for that. Hopefully she will discontinue very soon. Lasix added on a as needed basis after she is sent [facility] which showed increased volume lately. Diagnoses and all orders for this visit:    1. Atherosclerosis of native coronary artery of native heart without angina pectoris    2. Essential hypertension, benign  -     METABOLIC PANEL, BASIC; Future    3. Paroxysmal atrial fibrillation (HCC)    4.  ICD (implantable cardioverter-defibrillator) in place-DDD ICD    5. Mixed hyperlipidemia    6. Postsurgical aortocoronary bypass status    7. Tobacco abuse counseling    Other orders  -     furosemide (LASIX) 20 mg tablet; Take 1 Tab by mouth daily as needed (Edema). Pertinent laboratory and test data reviewed and discussed with patient. See patient instructions also for other medical advice given    There are no discontinued medications. Follow-up and Dispositions    · Return in about 6 months (around 12/1/2020), or if symptoms worsen or fail to improve, for with pacer/ICD check.

## 2020-06-03 ENCOUNTER — TELEPHONE (OUTPATIENT)
Dept: CARDIOLOGY CLINIC | Age: 70
End: 2020-06-03

## 2020-06-03 NOTE — TELEPHONE ENCOUNTER
I did not realize preop clearance part. Please schedule the patient for a nuclear stress test and an echo and I can clear after the testing without a follow-up. Please call and let the patient know.

## 2020-06-03 NOTE — LETTER
2020 Dr. Johnnie Feng [address] [place] [address]. [id number] Dear Dr. Johnnie Feng: 
 
Re: Sarahi Mills : 1950 Ms. Gayle Tello is cleared from a cardiac standpoint with high risk for re vascular of left leg  
 
surgery scheduled on 2020,Patient will have high risk for this surgery. General  
 
anesthesia be riskier compared to local anesthesia. If you have any questions or any further assistance is needed please contact our office. Sincerely, Signed By: Tiffanie Nascimento MD  
 2020 Evin Nascimento M.D. 
 
cc:  Shelia Gee MD

## 2020-06-03 NOTE — TELEPHONE ENCOUNTER
You saw patient on 6/1/2020 for surgical clearance for re vascular of left leg. No note of clearance noted in chart as to whether patient is cleared.  Fax note to [phone]

## 2020-06-04 NOTE — TELEPHONE ENCOUNTER
Patient will have high risk for this surgery. General anesthesia be riskier compared to local anesthesia.     Please send this to Aung's office

## 2020-06-04 NOTE — TELEPHONE ENCOUNTER
Spoke with patient and she stated that she is scheduled to have her vascular surgery with Dr Ray Monsalve on 6/9/2020. She stated that she would rather do this procedure with Dr Remi Alfonso first. Please Advise.

## 2020-06-04 NOTE — TELEPHONE ENCOUNTER
This was a high risk test.  Patient will need cardiac catheterization and revascularization as needed but given her kyphosis it is going to be challenging. Ask patient if she is willing to do the testing for cardiac catheterization and PCI as needed. If she refuses cardiac cath, her risk of vascular surgery will be high.

## 2020-06-08 NOTE — TELEPHONE ENCOUNTER
We need to make sure that she can lay down on the cath table. Please call her to the office on Thursday so I can see if she can lay down. We will also give preop work-up to her at that time.

## 2020-06-08 NOTE — TELEPHONE ENCOUNTER
Patient saw Dr Aleisha Russo today and patient has decided to go ahead with cath/pci patient is scheduled for 6/16/2020 @ 9:15 with you patient is on Eliquis when would you like for her to stop her Eliquis? Please Advise.

## 2020-06-09 NOTE — TELEPHONE ENCOUNTER
Called patient and she is aware that she is coming to the office to see if she can lay on the cath table.  Patient is scheduled to come in at 6/11/2020 @ 1:30

## 2020-06-10 NOTE — TELEPHONE ENCOUNTER
She should continue to take Eliquis as it has to be held only for 48 hours before the procedure. As discussed with patient earlier, bring her to the office tomorrow to see whether she can lie down for cardiac catheterization on the table. We can test it in our stress table.

## 2020-06-10 NOTE — TELEPHONE ENCOUNTER
Patient called questioning if she can start taking her Eliquis back, she did not take one last night because she was unsure. Please Advise.

## 2020-06-11 ENCOUNTER — OFFICE VISIT (OUTPATIENT)
Dept: CARDIOLOGY CLINIC | Age: 70
End: 2020-06-11

## 2020-06-11 VITALS
WEIGHT: 185 LBS | HEART RATE: 58 BPM | DIASTOLIC BLOOD PRESSURE: 60 MMHG | HEIGHT: 70 IN | BODY MASS INDEX: 26.48 KG/M2 | SYSTOLIC BLOOD PRESSURE: 150 MMHG

## 2020-06-11 DIAGNOSIS — Z95.1 POSTSURGICAL AORTOCORONARY BYPASS STATUS: ICD-10-CM

## 2020-06-11 DIAGNOSIS — Z01.810 PREOP CARDIOVASCULAR EXAM: ICD-10-CM

## 2020-06-11 DIAGNOSIS — I25.810 CORONARY ARTERY DISEASE INVOLVING CORONARY BYPASS GRAFT OF NATIVE HEART WITHOUT ANGINA PECTORIS: ICD-10-CM

## 2020-06-11 DIAGNOSIS — I48.0 PAROXYSMAL ATRIAL FIBRILLATION (HCC): Primary | ICD-10-CM

## 2020-06-11 DIAGNOSIS — I10 ESSENTIAL HYPERTENSION, BENIGN: ICD-10-CM

## 2020-06-11 DIAGNOSIS — E78.2 MIXED HYPERLIPIDEMIA: ICD-10-CM

## 2020-06-11 DIAGNOSIS — Z95.810 ICD (IMPLANTABLE CARDIOVERTER-DEFIBRILLATOR) IN PLACE: ICD-10-CM

## 2020-06-11 DIAGNOSIS — Z71.6 TOBACCO ABUSE COUNSELING: ICD-10-CM

## 2020-06-11 NOTE — H&P (VIEW-ONLY)
HISTORY OF PRESENT ILLNESS Nyasia Paez is a 68 y.o. female. Follow-up of CAD, status post CABG, hypertension, paroxysmal A. fib, status post ICD, hyperlipidemia. 6/20 seen for abnormal stress test which was done in [facility] in March 2024 preop testing for vascular surgery and was found to be abnormal.  She was recently seen 3 weeks ago when she did not mention anything about stress test being done in an outside hospital. 
 
 
 
 
Hypertension The history is provided by the medical records. This is a chronic problem. Pertinent negatives include no chest pain, no headaches and no shortness of breath. Palpitations The history is provided by the medical records and patient (PAF on ICD interrogation). Associated symptoms include lower extremity edema. Pertinent negatives include no fever, no malaise/fatigue, no chest pain, no claudication, no orthopnea, no PND, no nausea, no vomiting, no headaches, no dizziness, no cough and no shortness of breath. Her past medical history is significant for hypertension. Cholesterol Problem The history is provided by the medical records. This is a chronic problem. Pertinent negatives include no chest pain, no headaches and no shortness of breath. Leg Swelling The history is provided by the patient. This is a new problem. The current episode started more than 1 week ago (2-3 wks). The problem has been gradually improving. Pertinent negatives include no chest pain, no headaches and no shortness of breath. The symptoms are aggravated by standing. The symptoms are relieved by sleep and medications. Review of Systems Constitutional: Negative for chills, fever, malaise/fatigue and weight loss. HENT: Negative for nosebleeds. Eyes: Negative for discharge. Respiratory: Negative for cough, shortness of breath and wheezing. Cardiovascular: Positive for palpitations and leg swelling. Negative for chest pain, orthopnea, claudication and PND. Gastrointestinal: Negative for diarrhea, nausea and vomiting. Genitourinary: Negative for dysuria and hematuria. Musculoskeletal: Negative for joint pain. Skin: Negative for rash. Neurological: Negative for dizziness, seizures, loss of consciousness and headaches. Endo/Heme/Allergies: Negative for polydipsia. Does not bruise/bleed easily. Psychiatric/Behavioral: Negative for depression and substance abuse. The patient does not have insomnia. Allergies Allergen Reactions  Losartan Angioedema Tongue swelling but not on lips Past Medical History:  
Diagnosis Date  Arthritis  Automatic implantable cardiac defibrillator in situ  Coronary atherosclerosis of native coronary artery CABG 10/02 with LIMA to LAD and SVG to RCA  Depression  Essential hypertension  Headache(784.0)  Other and unspecified hyperlipidemia 9/12 Low density lipoproteins are at goal, triglycerides are at goal, high density lipoproteins are at goal.  
 Postsurgical aortocoronary bypass status 2002  Presence of permanent cardiac pacemaker-DDD 11/6/2015 OFFICE CHECK : 8/14 nl function, +AHR rare(changed detection to over 150/mt); 11/15 nl function  Unspecified arthropathy, lower leg S/P Left knee replacement. Had repeat surgery's for her left knee.  UTI (urinary tract infection) Family History Problem Relation Age of Onset  Arthritis-osteo Other   
     not specified  Hypertension Other   
     not specified  Sudden Death Neg Hx   
 Heart Attack Neg Hx Social History Tobacco Use  Smoking status: Current Some Day Smoker Packs/day: 0.05  Smokeless tobacco: Never Used  Tobacco comment:  1 or 2 puff at night before bed Substance Use Topics  Alcohol use: No  
 Drug use: Not on file Current Outpatient Medications Medication Sig  furosemide (LASIX) 20 mg tablet Take 1 Tab by mouth daily as needed (Edema).  metoprolol tartrate (LOPRESSOR) 100 mg IR tablet take 1 tablet by mouth twice a day  multivitamin (ONE A DAY) tablet Take 1 Tab by mouth daily.  cyanocobalamin (VITAMIN B-12) 1,000 mcg sublingual tablet Take 1,000 mcg by mouth daily.  doxazosin (CARDURA) 4 mg tablet Take 1 Tab by mouth nightly.  ELIQUIS 5 mg tablet take 1 tablet by mouth twice a day  acetaminophen (TYLENOL) 500 mg tablet Take  by mouth every six (6) hours as needed for Pain.  ezetimibe (ZETIA) 10 mg tablet Take  by mouth daily.  rosuvastatin (CRESTOR) 40 mg tablet Take 1 Tab by mouth nightly.  amLODIPine (NORVASC) 10 mg tablet Take 10 mg by mouth daily. No current facility-administered medications for this visit. Past Surgical History:  
Procedure Laterality Date  CARDIAC SURG PROCEDURE UNLIST  11/09  
 gen change-obici  HX CORONARY ARTERY BYPASS GRAFT  10/2002 Valorie Loo ORTHOPAEDIC  [id number] Reduced two component revision using the Sigma System. Lateral release. Suture anchor repair of medial collateral ligament. [address]  [id number] Left knee arthrofibrosis, status post significant scarring, status post left total knee replacement  VASCULAR SURGERY PROCEDURE UNLIST  2005 Aorto biffem. Dr. Luna Tyson Diagnostic Studies: 
CARDIOLOGY STUDIES 10/5/2017 Pharmacological Nuclear Stress Test Result mild small infbasal ischemia, 73%EF Some recent data might be hidden 3/20 nuclear stress test 
MARIANN REST/STRESS MULTIPLE SPECT3/12/2020 [employer] Component Name Value Ref Range EF Nuc 52    
Result Impression THIS MYOCARDIAL PERFUSION STUDY IS ABNORMAL 
 THIS IS A HIGH RISK STUDY  ABNORMAL NUCLEAR STUDY SUGGESTIVE OF TWO-VESSEL DISEASE 
 
 ON INITIAL STRESS IMAGES THERE IS A MODERATE TO LARGE AREA OF DIMINISHED UPTAKE IN THE ISOTOPE 
 THROUGHOUT THE ANTERIOR SEPTAL AND THE LATERAL WALL 
 
 ON THE REST IMAGES THERE IS ENHANCED UPTAKE THROUGHOUT THE ANTERIOR SEPTAL AND LATERAL WALL 
 
 THE GATED ACQUISITION SHOWS LATERAL WALL HYPOKINESIS AND AN EJECTION FRACTION ESTIMATED AT 
 50% THE ABOVE FINDINGS ARE CONSISTENT WITH TWO-VESSEL ISCHEMIA INVOLVING THE PROXIMAL LAD AND CIRCUMFLEX ARTERY Visit Vitals /60 Pulse (!) 58 Ht 5' 10\" (1.778 m) Wt 83.9 kg (185 lb) BMI 26.54 kg/m² Ms. Marilu Gonzalez has a reminder for a \"due or due soon\" health maintenance. I have asked that she contact her primary care provider for follow-up on this health maintenance. Physical Exam  
Constitutional: She is oriented to person, place, and time. She appears well-developed and well-nourished. No distress. kyphosis HENT:  
Head: Normocephalic and atraumatic. Eyes: Right eye exhibits no discharge. Left eye exhibits no discharge. No scleral icterus. Neck: Neck supple. No JVD present. Carotid bruit is not present. No thyromegaly present. Cardiovascular: Normal rate, regular rhythm, S1 normal, S2 normal, normal heart sounds and intact distal pulses. Exam reveals no gallop and no friction rub. No murmur heard. Pulmonary/Chest: Effort normal and breath sounds normal. She has no wheezes. She has no rales. Abdominal: Soft. She exhibits no mass. There is no abdominal tenderness. Musculoskeletal:     
   General: Edema (trace) present. Comments: Cant put legs straight Neurological: She is alert and oriented to person, place, and time. Skin: Skin is warm and dry. No rash noted. Psychiatric: She has a normal mood and affect. Her behavior is normal.  
wounds healing well left subclavicular area ASSESSMENT and PLAN 
 
 
AFib / Flutter seen on interrogation of ICD Started Eliquis 5 mg / BID 
HLD: 5/17 LDL69; 2/16 LDL81;  
LIPID COMPLETE PANELResulted: 2/7/2020 4:51 AM 
[address] Component Name Value Ref Range Cholesterol 129 110 - 200 mg/dL Triglyceride 73 40 - 149 mg/dL HDL 55 >=40 mg/dL Cholesterol/HDL 2.3 0.0 - 5.0 Non-HDL Cholesterol 74 <130 mg/dL LDL CALCULATION 59 50 - 99 mg/dL VLDL CALCULATION 15 8 - 30 mg/dL LDL/HDL Ratio 1.1    
 
 
 
BiV ICD: gen change 11/09; 3/17 CARE LINK 10/10; 8/12; 4/13; 8/13; 2/16; 5/16; 8/16  nl function, nl volume; 2/17 nl function; DONNY; 8/17; 1/19; 11/19 nl function, nl volume; 2/20 normal function, normal volume, PAF; 
OFFICE CHECK : 8/14 nl function, +AHR rare(changed detection to over 150/mt); 11/15; 11/16; 3/17 nl function, nl vol; 4/18 nl function, frequent AHR(likely AFlutter), nl volume; Normal function, normal volume, AT/AF episodes are far field sensing events. 6/1/2020 blood pressure is controlled now Continue Eliquis due to frequent AT/A. fib on ICD interrogation. Follow[facility] months. Office check in 6 months Stable CAD. Continue present medications. She has developed mild edema on the right leg but recently had a lot of stress and likely dietary indiscretion due to sister in the hospital and eventually dying in the hospital. 
Patient trying to stop smoking and she was congratulated for that. Hopefully she will discontinue very soon. Lasix added on a as needed basis after she is sent [facility] which showed increased volume lately. 6/11/2020: Patient seen again as vascular surgery was refused due to high risk stress test done in March 2020 at [facility] which I was not aware of last visit. She has flexion deformities of the knee but groin area is possible to be straightened and will support the knees with pillows. Blood work done in [facility] was reviewed and is acceptable as also chest x-ray. EKG today reveals atrial fibrillation and controlled ventricular rate. Patient again reminded to stop smoking. She had done it in the past and will try again. The benefits and risks of cardiac catheterization have been discussed in detail with the patient  present at the time.  Patient understands  risk of potential cath complications including but not limited to bleeding, infection, difficulty healing the arteriotomy access site(2 chances in 100) which may require surgical repair, potential thromboembolic complications which could result in stroke, myocardial infarction, vascular injury, loss of limb or organ function and/or death( 1 chance in 1000)and potential allergic reaction to contrast dye or other medication used during the procedure. Patient is also aware of the therapeutic implications for medical management vs coronary artery bypass surgery vs percutaneous coronary intervention in treatment of coronary artery disease. The additional risks for percutaneous coronary artery intervention include the need for emergent bypass surgery at 1 chance in 100 and for restenosis which may range from 35% for plain balloon angioplasty, 15% for bare metal stent, and 5% for drug eluting stent implants. The need for mandatory uninterrupted dual antiplatelet therapy with lifelong Aspirin combined with Plavix for up to 12 months following drug eluting stents, and minimum 1 month following bare metal stents to prevent stent thrombosis which is the equivalent of acute heart attack has been reviewed in detail. No contraindications to use of drug eluting stents has been discovered. Diagnoses and all orders for this visit: 1. Paroxysmal atrial fibrillation (HCC) -     AMB POC EKG ROUTINE W/ 12 LEADS, INTER & REP 2. Coronary artery disease involving coronary bypass graft of native heart without angina pectoris 3. Essential hypertension, benign 4. ICD (implantable cardioverter-defibrillator) in place-DDD ICD 5. Mixed hyperlipidemia 6. Postsurgical aortocoronary bypass status 7. Tobacco abuse counseling 8. Preop cardiovascular exam 
 
 
 
Pertinent laboratory and test data reviewed and discussed with patient. See patient instructions also for other medical advice given There are no discontinued medications. Follow-up and Dispositions · Return in about 6 weeks (around 7/23/2020), or if symptoms worsen or fail to improve, for post procedure.

## 2020-06-11 NOTE — PROGRESS NOTES
1. Have you been to the ER, urgent care clinic since your last visit? Hospitalized since your last visit?     no  2. Have you seen or consulted any other health care providers outside of the [address] [place] [address] since your last visit? Include any pap smears or colon screening. No     3. Since your last visit, have you had any of the following symptoms? no       4. Have you had any blood work, X-rays or cardiac testing? No       5. Where do you normally have your labs drawn? [facility]  6. Do you need any refills today?    no

## 2020-06-11 NOTE — PROGRESS NOTES
HISTORY OF PRESENT ILLNESS  Bessy Flores is a 68 y.o. female. Follow-up of CAD, status post CABG, hypertension, paroxysmal A. fib, status post ICD, hyperlipidemia. 6/20 seen for abnormal stress test which was done in [facility] in March 2024 preop testing for vascular surgery and was found to be abnormal.  She was recently seen 3 weeks ago when she did not mention anything about stress test being done in an outside hospital.          Hypertension   The history is provided by the medical records. This is a chronic problem. Pertinent negatives include no chest pain, no headaches and no shortness of breath. Palpitations    The history is provided by the medical records and patient (PAF on ICD interrogation). Associated symptoms include lower extremity edema. Pertinent negatives include no fever, no malaise/fatigue, no chest pain, no claudication, no orthopnea, no PND, no nausea, no vomiting, no headaches, no dizziness, no cough and no shortness of breath. Her past medical history is significant for hypertension. Cholesterol Problem   The history is provided by the medical records. This is a chronic problem. Pertinent negatives include no chest pain, no headaches and no shortness of breath. Leg Swelling   The history is provided by the patient. This is a new problem. The current episode started more than 1 week ago (2-3 wks). The problem has been gradually improving. Pertinent negatives include no chest pain, no headaches and no shortness of breath. The symptoms are aggravated by standing. The symptoms are relieved by sleep and medications. Review of Systems   Constitutional: Negative for chills, fever, malaise/fatigue and weight loss. HENT: Negative for nosebleeds. Eyes: Negative for discharge. Respiratory: Negative for cough, shortness of breath and wheezing. Cardiovascular: Positive for palpitations and leg swelling. Negative for chest pain, orthopnea, claudication and PND. Gastrointestinal: Negative for diarrhea, nausea and vomiting. Genitourinary: Negative for dysuria and hematuria. Musculoskeletal: Negative for joint pain. Skin: Negative for rash. Neurological: Negative for dizziness, seizures, loss of consciousness and headaches. Endo/Heme/Allergies: Negative for polydipsia. Does not bruise/bleed easily. Psychiatric/Behavioral: Negative for depression and substance abuse. The patient does not have insomnia. Allergies   Allergen Reactions    Losartan Angioedema     Tongue swelling but not on lips       Past Medical History:   Diagnosis Date    Arthritis     Automatic implantable cardiac defibrillator in situ     Coronary atherosclerosis of native coronary artery     CABG 10/02 with LIMA to LAD and SVG to RCA    Depression     Essential hypertension     Headache(784.0)     Other and unspecified hyperlipidemia     9/12 Low density lipoproteins are at goal, triglycerides are at goal, high density lipoproteins are at goal.    Postsurgical aortocoronary bypass status 2002    Presence of permanent cardiac pacemaker-DDD 11/6/2015    OFFICE CHECK : 8/14 nl function, +AHR rare(changed detection to over 150/mt); 11/15 nl function     Unspecified arthropathy, lower leg     S/P Left knee replacement. Had repeat surgery's for her left knee.  UTI (urinary tract infection)        Family History   Problem Relation Age of Onset    Arthritis-osteo Other         not specified    Hypertension Other         not specified    Sudden Death Neg Hx     Heart Attack Neg Hx        Social History     Tobacco Use    Smoking status: Current Some Day Smoker     Packs/day: 0.05    Smokeless tobacco: Never Used    Tobacco comment:  1 or 2 puff at night before bed   Substance Use Topics    Alcohol use: No    Drug use: Not on file        Current Outpatient Medications   Medication Sig    furosemide (LASIX) 20 mg tablet Take 1 Tab by mouth daily as needed (Edema).     metoprolol tartrate (LOPRESSOR) 100 mg IR tablet take 1 tablet by mouth twice a day    multivitamin (ONE A DAY) tablet Take 1 Tab by mouth daily.  cyanocobalamin (VITAMIN B-12) 1,000 mcg sublingual tablet Take 1,000 mcg by mouth daily.  doxazosin (CARDURA) 4 mg tablet Take 1 Tab by mouth nightly.  ELIQUIS 5 mg tablet take 1 tablet by mouth twice a day    acetaminophen (TYLENOL) 500 mg tablet Take  by mouth every six (6) hours as needed for Pain.  ezetimibe (ZETIA) 10 mg tablet Take  by mouth daily.  rosuvastatin (CRESTOR) 40 mg tablet Take 1 Tab by mouth nightly.  amLODIPine (NORVASC) 10 mg tablet Take 10 mg by mouth daily. No current facility-administered medications for this visit. Past Surgical History:   Procedure Laterality Date    CARDIAC SURG PROCEDURE UNLIST  11/09    gen change-obici    HX CORONARY ARTERY BYPASS GRAFT  10/2002    HX ORTHOPAEDIC  [id number]    Reduced two component revision using the Sigma System. Lateral release. Suture anchor repair of medial collateral ligament. [facility]  [id number]    Left knee arthrofibrosis, status post significant scarring, status post left total knee replacement    VASCULAR SURGERY PROCEDURE UNLIST  2005    Aorto biffem.   Dr. Tg Hoyt       Diagnostic Studies:  CARDIOLOGY STUDIES 10/5/2017   Pharmacological Nuclear Stress Test Result mild small infbasal ischemia, 73%EF   Some recent data might be hidden     3/20 nuclear stress test  MARIANN REST/STRESS MULTIPLE [address] [facility]  Component Name Value Ref Range   EF Nuc 52     Result Impression    THIS MYOCARDIAL PERFUSION STUDY IS ABNORMAL   THIS IS A HIGH RISK STUDY   ABNORMAL NUCLEAR STUDY SUGGESTIVE OF TWO-VESSEL DISEASE     ON INITIAL STRESS IMAGES THERE IS A MODERATE TO LARGE AREA OF DIMINISHED UPTAKE IN THE ISOTOPE   THROUGHOUT THE ANTERIOR SEPTAL AND THE LATERAL WALL     ON THE REST IMAGES THERE IS ENHANCED UPTAKE THROUGHOUT THE ANTERIOR SEPTAL AND LATERAL WALL     THE GATED ACQUISITION SHOWS LATERAL WALL HYPOKINESIS AND AN EJECTION FRACTION ESTIMATED AT   50%     THE ABOVE FINDINGS ARE CONSISTENT WITH TWO-VESSEL ISCHEMIA INVOLVING THE PROXIMAL LAD AND   CIRCUMFLEX ARTERY       Visit Vitals  /60   Pulse (!) 58   Ht 5' 10\" (1.778 m)   Wt 83.9 kg (185 lb)   BMI 26.54 kg/m²       Ms. Marcela Jesus has a reminder for a \"due or due soon\" health maintenance. I have asked that she contact her primary care provider for follow-up on this health maintenance. Physical Exam   Constitutional: She is oriented to person, place, and time. She appears well-developed and well-nourished. No distress. kyphosis   HENT:   Head: Normocephalic and atraumatic. Eyes: Right eye exhibits no discharge. Left eye exhibits no discharge. No scleral icterus. Neck: Neck supple. No JVD present. Carotid bruit is not present. No thyromegaly present. Cardiovascular: Normal rate, regular rhythm, S1 normal, S2 normal, normal heart sounds and intact distal pulses. Exam reveals no gallop and no friction rub. No murmur heard. Pulmonary/Chest: Effort normal and breath sounds normal. She has no wheezes. She has no rales. Abdominal: Soft. She exhibits no mass. There is no abdominal tenderness. Musculoskeletal:         General: Edema (trace) present. Comments: Cant put legs straight   Neurological: She is alert and oriented to person, place, and time. Skin: Skin is warm and dry. No rash noted. Psychiatric: She has a normal mood and affect.  Her behavior is normal.   wounds healing well left subclavicular area     ASSESSMENT and PLAN      AFib / Flutter seen on interrogation of ICD  Started Eliquis 5 mg / BID  HLD: 5/17 LDL69; 2/16 LDL81;   LIPID COMPLETE PANELResulted: 2/7/2020 4:51 AM  [employer]  Component Name Value Ref Range   Cholesterol 129 110 - 200 mg/dL   Triglyceride 73 40 - 149 mg/dL   HDL 55 >=40 mg/dL   Cholesterol/HDL 2.3 0.0 - 5.0    Non-HDL Cholesterol 74 <130 mg/dL LDL CALCULATION 59 50 - 99 mg/dL   VLDL CALCULATION 15 8 - 30 mg/dL   LDL/HDL Ratio 1.1           BiV ICD: gen change 11/09; 3/17  CARE LINK 10/10; 8/12; 4/13; 8/13; 2/16; 5/16; 8/16  nl function, nl volume; 2/17 nl function; DONNY; 8/17; 1/19; 11/19 nl function, nl volume; 2/20 normal function, normal volume, PAF;  OFFICE CHECK : 8/14 nl function, +AHR rare(changed detection to over 150/mt); 11/15; 11/16; 3/17 nl function, nl vol; 4/18 nl function, frequent AHR(likely AFlutter), nl volume; Normal function, normal volume, AT/AF episodes are far field sensing events. 6/1/2020 blood pressure is controlled now  Continue Eliquis due to frequent AT/A. fib on ICD interrogation. Follow-up [facility] every 3 months. Office check in 6 months  Stable CAD. Continue present medications. She has developed mild edema on the right leg but recently had a lot of stress and likely dietary indiscretion due to sister in the hospital and eventually dying in the hospital.  Patient trying to stop smoking and she was congratulated for that. Hopefully she will discontinue very soon. Lasix added on a as needed basis after she is sent [facility] which showed increased volume lately. 6/11/2020: Patient seen again as vascular surgery was refused due to high risk stress test done in March 2020 at [facility] which I was not aware of last visit. She has flexion deformities of the knee but groin area is possible to be straightened and will support the knees with pillows. Blood work done in [facility] was reviewed and is acceptable as also chest x-ray. EKG today reveals atrial fibrillation and controlled ventricular rate. Patient again reminded to stop smoking. She had done it in the past and will try again. The benefits and risks of cardiac catheterization have been discussed in detail with the patient  present at the time.  Patient understands  risk of potential cath complications including but not limited to bleeding, infection, difficulty healing the arteriotomy access site(2 chances in 100) which may require surgical repair, potential thromboembolic complications which could result in stroke, myocardial infarction, vascular injury, loss of limb or organ function and/or death( 1 chance in 1000)and potential allergic reaction to contrast dye or other medication used during the procedure. Patient is also aware of the therapeutic implications for medical management vs coronary artery bypass surgery vs percutaneous coronary intervention in treatment of coronary artery disease. The additional risks for percutaneous coronary artery intervention include the need for emergent bypass surgery at 1 chance in 100 and for restenosis which may range from 35% for plain balloon angioplasty, 15% for bare metal stent, and 5% for drug eluting stent implants. The need for mandatory uninterrupted dual antiplatelet therapy with lifelong Aspirin combined with Plavix for up to 12 months following drug eluting stents, and minimum 1 month following bare metal stents to prevent stent thrombosis which is the equivalent of acute heart attack has been reviewed in detail. No contraindications to use of drug eluting stents has been discovered. Diagnoses and all orders for this visit:    1. Paroxysmal atrial fibrillation (HCC)  -     AMB POC EKG ROUTINE W/ 12 LEADS, INTER & REP    2. Coronary artery disease involving coronary bypass graft of native heart without angina pectoris    3. Essential hypertension, benign    4. ICD (implantable cardioverter-defibrillator) in place-DDD ICD    5. Mixed hyperlipidemia    6. Postsurgical aortocoronary bypass status    7. Tobacco abuse counseling    8. Preop cardiovascular exam        Pertinent laboratory and test data reviewed and discussed with patient. See patient instructions also for other medical advice given    There are no discontinued medications.     Follow-up and Dispositions    · Return in about 6 weeks (around 7/23/2020), or if symptoms worsen or fail to improve, for post procedure.

## 2020-06-11 NOTE — PATIENT INSTRUCTIONS
There are no discontinued medications. Hold Eliquis after Sunday a.m. dose and start aspirin 81 or 325 mg a day 
  hold Lasix starting today Coronary Angiogram: About This Test 
What is a coronary angiogram? 
 
A coronary angiogram is a test to look at the large blood vessels of your heart (coronary arteries). These blood vessels feed blood, oxygen, and nutrients to your heart. Why is this test done? This test is done to check blood flow in your coronary arteries. It can show the size and location of narrowed or blocked sections of an artery. It's done for people who have coronary artery disease, also known as heart disease. The test can show how serious the disease is and how best to treat it. Or it can be done for people who have symptoms of heart disease to find out if there is a problem with the artery. How is the test done? · You will get medicine to help you relax. · A thin tube called a catheter is put into a blood vessel in your groin or arm. · You will get a shot to numb the skin where the catheter goes in. You may feel pressure when the doctor moves the catheter through your blood vessel into your heart. · Dye is put into your coronary arteries through the catheter. Your doctor can see the dye as it moves through the arteries. This lets your doctor look for areas that are narrowed or blocked. · You may feel hot or flushed for several seconds when the dye is put in. 
· If you have a narrowed or blocked artery, the doctor may do an angioplasty or a coronary stent procedure. These procedures make more room for blood to flow. How long does it take? The test will take about 30 minutes. But you need time to get ready for it and time to recover. If you also have angioplasty and possibly a stent placed after the test, the whole procedure may take a few hours.  
What happens after the test? 
· You will stay in a room for at least a few hours to make sure the catheter site starts to heal. You may have a bandage or a compression device on your groin or arm to prevent bleeding. · If the catheter was placed in your groin, you may lie in bed for a few hours. If the catheter was put in your arm, you will need to keep your arm still for at least 1 hour. · You may be able to go home later the same day, or you may need to stay in the hospital overnight. You will get more instructions for what to do at home. · Drink plenty of fluids for several hours after the test. 
Follow-up care is a key part of your treatment and safety. Be sure to make and go to all appointments, and call your doctor if you are having problems. It's also a good idea to know your test results and keep a list of the medicines you take. Where can you learn more? Go to http://eric-randee.info/ Enter B534 in the search box to learn more about \"Coronary Angiogram: About This Test.\" Current as of: December 16, 2019               Content Version: 12.5 © 5423-7566 [employer]. Care instructions adapted under license by [employer] (which disclaims liability or warranty for this information). If you have questions about a medical condition or this instruction, always ask your healthcare professional. [address] any warranty or liability for your use of this information.

## 2020-06-16 ENCOUNTER — HOSPITAL ENCOUNTER (OUTPATIENT)
Age: 70
Setting detail: OUTPATIENT SURGERY
Discharge: HOME | End: 2020-06-16
Attending: INTERNAL MEDICINE | Admitting: INTERNAL MEDICINE
Payer: MEDICARE

## 2020-06-16 VITALS
WEIGHT: 182 LBS | HEIGHT: 70 IN | OXYGEN SATURATION: 100 % | DIASTOLIC BLOOD PRESSURE: 88 MMHG | BODY MASS INDEX: 26.05 KG/M2 | HEART RATE: 68 BPM | SYSTOLIC BLOOD PRESSURE: 148 MMHG

## 2020-06-16 DIAGNOSIS — I25.10 CAD (CORONARY ARTERY DISEASE): ICD-10-CM

## 2020-06-16 LAB
BUN BLD-MCNC: 14 MG/DL (ref 7–18)
CHLORIDE BLD-SCNC: 108 MMOL/L (ref 100–108)
GLUCOSE BLD STRIP.AUTO-MCNC: 100 MG/DL (ref 74–106)
HCT VFR BLD CALC: 39 % (ref 36–49)
HGB BLD-MCNC: 13.3 G/DL (ref 12–16)
POTASSIUM BLD-SCNC: 4.1 MMOL/L (ref 3.5–5.5)
SODIUM BLD-SCNC: 141 MMOL/L (ref 136–145)

## 2020-06-16 PROCEDURE — 77030016699 HC CATH ANGI DX INFN1 CARD -A: Performed by: INTERNAL MEDICINE

## 2020-06-16 PROCEDURE — C1894 INTRO/SHEATH, NON-LASER: HCPCS | Performed by: INTERNAL MEDICINE

## 2020-06-16 PROCEDURE — 74011636320 HC RX REV CODE- 636/320: Performed by: INTERNAL MEDICINE

## 2020-06-16 PROCEDURE — C1769 GUIDE WIRE: HCPCS | Performed by: INTERNAL MEDICINE

## 2020-06-16 PROCEDURE — 99153 MOD SED SAME PHYS/QHP EA: CPT | Performed by: INTERNAL MEDICINE

## 2020-06-16 PROCEDURE — 74011000250 HC RX REV CODE- 250: Performed by: INTERNAL MEDICINE

## 2020-06-16 PROCEDURE — 77030013797 HC KT TRNSDUC PRSSR EDWD -A: Performed by: INTERNAL MEDICINE

## 2020-06-16 PROCEDURE — 74011250636 HC RX REV CODE- 250/636: Performed by: INTERNAL MEDICINE

## 2020-06-16 PROCEDURE — 99152 MOD SED SAME PHYS/QHP 5/>YRS: CPT | Performed by: INTERNAL MEDICINE

## 2020-06-16 PROCEDURE — 93459 L HRT ART/GRFT ANGIO: CPT | Performed by: INTERNAL MEDICINE

## 2020-06-16 PROCEDURE — 82947 ASSAY GLUCOSE BLOOD QUANT: CPT

## 2020-06-16 RX ORDER — LIDOCAINE HYDROCHLORIDE 10 MG/ML
INJECTION, SOLUTION EPIDURAL; INFILTRATION; INTRACAUDAL; PERINEURAL AS NEEDED
Status: DISCONTINUED | OUTPATIENT
Start: 2020-06-16 | End: 2020-06-16 | Stop reason: HOSPADM

## 2020-06-16 RX ORDER — ACETAMINOPHEN 325 MG/1
650 TABLET ORAL
Status: DISCONTINUED | OUTPATIENT
Start: 2020-06-16 | End: 2020-06-16 | Stop reason: HOSPADM

## 2020-06-16 RX ORDER — SODIUM CHLORIDE 0.9 % (FLUSH) 0.9 %
5-40 SYRINGE (ML) INJECTION AS NEEDED
Status: DISCONTINUED | OUTPATIENT
Start: 2020-06-16 | End: 2020-06-16 | Stop reason: HOSPADM

## 2020-06-16 RX ORDER — MIDAZOLAM HYDROCHLORIDE 1 MG/ML
INJECTION, SOLUTION INTRAMUSCULAR; INTRAVENOUS AS NEEDED
Status: DISCONTINUED | OUTPATIENT
Start: 2020-06-16 | End: 2020-06-16 | Stop reason: HOSPADM

## 2020-06-16 RX ORDER — ONDANSETRON 2 MG/ML
4 INJECTION INTRAMUSCULAR; INTRAVENOUS ONCE
Status: COMPLETED | OUTPATIENT
Start: 2020-06-16 | End: 2020-06-16

## 2020-06-16 RX ORDER — SODIUM CHLORIDE 9 MG/ML
100 INJECTION, SOLUTION INTRAVENOUS CONTINUOUS
Status: DISCONTINUED | OUTPATIENT
Start: 2020-06-16 | End: 2020-06-16 | Stop reason: HOSPADM

## 2020-06-16 RX ORDER — SODIUM CHLORIDE 0.9 % (FLUSH) 0.9 %
5-40 SYRINGE (ML) INJECTION EVERY 8 HOURS
Status: DISCONTINUED | OUTPATIENT
Start: 2020-06-16 | End: 2020-06-16 | Stop reason: HOSPADM

## 2020-06-16 RX ORDER — HYDRALAZINE HYDROCHLORIDE 20 MG/ML
10 INJECTION INTRAMUSCULAR; INTRAVENOUS
Status: DISCONTINUED | OUTPATIENT
Start: 2020-06-16 | End: 2020-06-16 | Stop reason: HOSPADM

## 2020-06-16 RX ORDER — NITROGLYCERIN 0.4 MG/1
0.4 TABLET SUBLINGUAL
Status: DISCONTINUED | OUTPATIENT
Start: 2020-06-16 | End: 2020-06-16 | Stop reason: HOSPADM

## 2020-06-16 RX ORDER — FENTANYL CITRATE 50 UG/ML
INJECTION, SOLUTION INTRAMUSCULAR; INTRAVENOUS AS NEEDED
Status: DISCONTINUED | OUTPATIENT
Start: 2020-06-16 | End: 2020-06-16 | Stop reason: HOSPADM

## 2020-06-16 RX ADMIN — ONDANSETRON 4 MG: 2 INJECTION INTRAMUSCULAR; INTRAVENOUS at 11:20

## 2020-06-16 NOTE — DISCHARGE INSTRUCTIONS
HEART CATHETERIZATION DISCHARGE INSTRUCTIONS    1. Check puncture site frequently for swelling or bleeding. If there is any bleeding, lie down and apply pressure over the area with a clean towel or washcloth. Notify your doctor for any redness, swelling, drainage, or oozing from the puncture site. Notify your doctor for any fever or chills. 2. If the extremity becomes cold, numb, or painful call Dr. Niya Sauceda  3. Activity should be limited for the next 48 hours. Climb stairs as little as possible and avoid any stooping, bending, or strenuous activity for 48 hours. No heavy lifting (anything over 10 pounds) for 3 days. 4. You may resume your usual diet. Drink more fluids than usual.  5. Have a responsible person drive you home and stay with you for at least 24 hours after your heart catheterization/angiography. You may remove bandage from your Right and Groin  DISCHARGE SUMMARY from Nurse    PATIENT INSTRUCTIONS:    After general anesthesia or intravenous sedation, for 24 hours or while taking prescription Narcotics:  · Limit your activities  · Do not drive and operate hazardous machinery  · Do not make important personal or business decisions  · Do  not drink alcoholic beverages  · If you have not urinated within 8 hours after discharge, please contact your surgeon on call.     Report the following to your surgeon:  · Excessive pain, swelling, redness or odor of or around the surgical area  · Temperature over 100.5  · Nausea and vomiting lasting longer than 4 hours or if unable to take medications  · Any signs of decreased circulation or nerve impairment to extremity: change in color, persistent  numbness, tingling, coldness or increase pain  · Any questions    What to do at Home:  Recommended activity: Activity as tolerated and no driving for today,     These are general instructions for a healthy lifestyle:    No smoking/ No tobacco products/ Avoid exposure to second hand smoke  Surgeon General's Warning:  Quitting smoking now greatly reduces serious risk to your health. Obesity, smoking, and sedentary lifestyle greatly increases your risk for illness    A healthy diet, regular physical exercise & weight monitoring are important for maintaining a healthy lifestyle    You may be retaining fluid if you have a history of heart failure or if you experience any of the following symptoms:  Weight gain of 3 pounds or more overnight or 5 pounds in a week, increased swelling in our hands or feet or shortness of breath while lying flat in bed. Please call your doctor as soon as you notice any of these symptoms; do not wait until your next office visit. The discharge information has been reviewed with the patient. The patient verbalized understanding. Discharge medications reviewed with the patient and appropriate educational materials and side effects teaching were provided. 6. ___________________________________________________________________________________________________________________________________ in 24 hours. You may shower in 24 hours. No tub baths, hot tubs, or swimming for 1 week. Do not place any lotions, creams, powders, or ointments over puncture site for 1 week. You may place a clean band-aid over the puncture site each day for 5 days. Change daily. I have read the above instructions and have had the opportunity to ask questions.       Patient: ________________________   Date: 6/16/2020    Witness: _______________________   Date: 6/16/2020

## 2020-06-16 NOTE — INTERVAL H&P NOTE
Update History & Physical 
 
The Patient's History and Physical of June 11, 2020, Procedure was reviewed with the patient and I examined the patient. There was no change. The surgical site was confirmed by the patient and me. Plan:  The risk, benefits, expected outcome, and alternative to the recommended procedure have been discussed with the patient. Patient understands and wants to proceed with the procedure.  
 
Electronically signed by Robby Paulson MD on 6/16/2020 at 9:15 AM

## 2020-06-16 NOTE — PROGRESS NOTES
TRANSFER - IN REPORT:    Verbal report received from [facility] DR CIERA KOTHARI  on Brigitte Sas  being received from [address] [place] [address]  for routine post - op      Report consisted of patients Situation, Background, Assessment and   Recommendations(SBAR). Information from the following report(s) SBAR, Procedure Summary and MAR was reviewed with the receiving nurse. Opportunity for questions and clarification was provided. Assessment completed upon patients arrival to unit and care assumed.

## 2020-06-16 NOTE — PROGRESS NOTES
4 fr sheath manually pulled from pts right fem artery, manual pressure held x 20 min , no bleeding or hematoma formation noted. Pressure dressing applied.

## 2020-06-16 NOTE — Clinical Note
Bilateral groin prepped with ChloraPrep and draped. Wet prep solution applied at: 932. Wet prep solution dried at: 935. Wet prep elapsed drying time: 3 mins.

## 2020-06-16 NOTE — PROGRESS NOTES
I have reviewed discharge instructions with the patient. The patient verbalized understanding. Patient armband removed and given to patient to take home. Patient was informed of the privacy risks if armband lost or stolen. Pt taken to lobby via wheelchair and assisted into friends car.

## 2020-06-16 NOTE — Clinical Note
TRANSFER - OUT REPORT:     Verbal report given to: Myron Maloeny RN. Report consisted of patient's Situation, Background, Assessment and   Recommendations(SBAR). Opportunity for questions and clarification was provided. Patient transported with a Registered Nurse. Patient transported to: [address]

## 2020-06-16 NOTE — Clinical Note
TRANSFER - IN REPORT:     Verbal report received from: Lola Mina RN. Report consisted of patient's Situation, Background, Assessment and   Recommendations(SBAR). Opportunity for questions and clarification was provided. Assessment completed upon patient's arrival to unit and care assumed. Patient transported with a Cardiac Cath Tech / Patient Care Tech.

## 2020-06-18 ENCOUNTER — TELEPHONE (OUTPATIENT)
Dept: CARDIOLOGY CLINIC | Age: 70
End: 2020-06-18

## 2020-06-18 NOTE — TELEPHONE ENCOUNTER
I discussed with cardiac surgeon Dr. Lo Long at [facility] and he did not think patient was a good candidate for bypass surgery due to severely calcified aorta, use of walker and the fact that LIMA was patent. I discussed with Dr. Zainab Conner and informed him the high risk nature of the vascular surgery. He will follow-up with the patient. I called the patient and updated her on all the decisions and she should continue with the medications from cardiac standpoint as now and follow-up in August when she has her appointment with me.

## 2020-06-18 NOTE — TELEPHONE ENCOUNTER
Patient called and stated that when she was discharged from her cath that she was given Dr Dudley Genera number and name patient does not remember if she was suppose to call and make an appointment with then or do we need to schedule an appointment. Please Advise.

## 2020-06-22 RX ORDER — FUROSEMIDE 20 MG/1
TABLET ORAL
Qty: 30 TAB | Refills: 0 | Status: SHIPPED | OUTPATIENT
Start: 2020-06-22 | End: 2020-07-28

## 2020-07-07 ENCOUNTER — TELEPHONE (OUTPATIENT)
Dept: CARDIOLOGY CLINIC | Age: 70
End: 2020-07-07

## 2020-07-07 DIAGNOSIS — I10 ESSENTIAL HYPERTENSION, BENIGN: ICD-10-CM

## 2020-07-28 RX ORDER — FUROSEMIDE 20 MG/1
TABLET ORAL
Qty: 30 TAB | Refills: 0 | Status: SHIPPED | OUTPATIENT
Start: 2020-07-28 | End: 2020-08-28

## 2020-08-01 DIAGNOSIS — I10 ESSENTIAL HYPERTENSION, BENIGN: ICD-10-CM

## 2020-08-03 ENCOUNTER — OFFICE VISIT (OUTPATIENT)
Dept: CARDIOLOGY CLINIC | Age: 70
End: 2020-08-03

## 2020-08-03 VITALS
SYSTOLIC BLOOD PRESSURE: 153 MMHG | HEART RATE: 59 BPM | BODY MASS INDEX: 25.77 KG/M2 | DIASTOLIC BLOOD PRESSURE: 65 MMHG | TEMPERATURE: 97.2 F | HEIGHT: 70 IN | WEIGHT: 180 LBS

## 2020-08-03 DIAGNOSIS — Z71.6 TOBACCO ABUSE COUNSELING: ICD-10-CM

## 2020-08-03 DIAGNOSIS — Z95.1 POSTSURGICAL AORTOCORONARY BYPASS STATUS: ICD-10-CM

## 2020-08-03 DIAGNOSIS — I25.810 CORONARY ARTERY DISEASE INVOLVING CORONARY BYPASS GRAFT OF NATIVE HEART WITHOUT ANGINA PECTORIS: ICD-10-CM

## 2020-08-03 DIAGNOSIS — Z95.810 ICD (IMPLANTABLE CARDIOVERTER-DEFIBRILLATOR) IN PLACE: ICD-10-CM

## 2020-08-03 DIAGNOSIS — I10 ESSENTIAL HYPERTENSION, BENIGN: ICD-10-CM

## 2020-08-03 DIAGNOSIS — I48.0 PAROXYSMAL ATRIAL FIBRILLATION (HCC): Primary | ICD-10-CM

## 2020-08-03 DIAGNOSIS — E78.2 MIXED HYPERLIPIDEMIA: ICD-10-CM

## 2020-08-03 RX ORDER — DOXAZOSIN 8 MG/1
8 TABLET ORAL
Qty: 90 TAB | Refills: 1 | Status: SHIPPED | OUTPATIENT
Start: 2020-08-03 | End: 2020-10-05 | Stop reason: SDUPTHER

## 2020-08-03 RX ORDER — DOXAZOSIN 4 MG/1
4 TABLET ORAL
Qty: 90 TAB | Refills: 3 | Status: SHIPPED | OUTPATIENT
Start: 2020-08-03 | End: 2020-08-03

## 2020-08-03 NOTE — PATIENT INSTRUCTIONS
There are no discontinued medications. please get remote checks for pacer or ICD every 3 months and Office check in 1 yr Please call our office after every transmission to confirm success of transmission Learning About Benefits From Quitting Smoking How does quitting smoking make you healthier? If you're thinking about quitting smoking, you may have a few reasons to be smoke-free. Your health may be one of them. · When you quit smoking, you lower your risks for cancer, lung disease, heart attack, stroke, blood vessel disease, and blindness from macular degeneration. · When you're smoke-free, you get sick less often, and you heal faster. You are less likely to get colds, flu, bronchitis, and pneumonia. · As a nonsmoker, you may find that your mood is better and you are less stressed. When and how will you feel healthier? Quitting has real health benefits that start from day 1 of being smoke-free. And the longer you stay smoke-free, the healthier you get and the better you feel. The first hours · After just 20 minutes, your blood pressure and heart rate go down. That means there's less stress on your heart and blood vessels. · Within 12 hours, the level of carbon monoxide in your blood drops back to normal. That makes room for more oxygen. With more oxygen in your body, you may notice that you have more energy than when you smoked. After 2 weeks · Your lungs start to work better. · Your risk of heart attack starts to drop. After 1 month · When your lungs are clear, you cough less and breathe deeper, so it's easier to be active. · Your sense of taste and smell return. That means you can enjoy food more than you have since you started smoking. Over the years · Over the years, your risks of heart disease, heart attack, and stroke are lower. · After 10 years, your risk of dying from lung cancer is cut by about half. And your risk for many other types of cancer is lower too. How would quitting help others in your life? When you quit smoking, you improve the health of everyone who now breathes in your smoke. · Their heart, lung, and cancer risks drop, much like yours. · They are sick less. For babies and small children, living smoke-free means they're less likely to have ear infections, pneumonia, and bronchitis. · If you're a woman who is or will be pregnant someday, quitting smoking means a healthier . · Children who are close to you are less likely to become adult smokers. Where can you learn more? Go to http://eric-randee.info/ Enter 052 [id number] 72 11 in the search box to learn more about \"Learning About Benefits From Quitting Smoking. \" Current as of: 2020               Content Version: 12.5 © 9251-2959 [employer]wise, Incorporated. Care instructions adapted under license by [employer] (which disclaims liability or warranty for this information). If you have questions about a medical condition or this instruction, always ask your healthcare professional. [place] [address] any warranty or liability for your use of this information. Stream Tags Activation Thank you for requesting access to Stream T[employer]. Please follow the instructions below to securely access and download your online medical record. Stream T[employer] allows you to send messages to your doctor, view your test results, renew your prescriptions, schedule appointments, and more. How Do I Sign Up? 1. In your internet browser, go to [email or web address]. 2. Click on the First Time User? Click Here link in the Sign In box. You will see the New Member Sign Up page. 3. Enter your Stream Tags Access Code exactly as it appears below. You will not need to use this code after youve completed the sign-up process. If you do not sign up before the expiration date, you must request a new code. Stream Tags Access Code: [email or web address] Expires: 2020  1:23 PM (This is the date your [employer] access code will ) 4. Enter the last four digits of your Social Security Number (xxxx) and Date of Birth (mm/dd/yyyy) as indicated and click Submit. You will be taken to the next sign-up page. 5. Create a [employer] ID. This will be your [employer] login ID and cannot be changed, so think of one that is secure and easy to remember. 6. Create a [employer] password. You can change your password at any time. 7. Enter your Password Reset Question and Answer. This can be used at a later time if you forget your password. 8. Enter your e-mail address. You will receive e-mail notification when new information is available in [address] 9. Click Sign Up. You can now view and download portions of your medical record. 10. Click the Download Summary menu link to download a portable copy of your medical information. Additional Information If you have questions, please visit the Frequently Asked Questions section of the [employer] website at [email or web address] Remember, [employer] is NOT to be used for urgent needs. For medical emergencies, dial 911.

## 2020-08-03 NOTE — PROGRESS NOTES
HISTORY OF PRESENT ILLNESS  Nancy Bowles is a 68 y.o. female. Follow-up of CAD, status post CABG, hypertension, paroxysmal A. fib, status post ICD, hyperlipidemia. 6/20 seen for abnormal stress test which was done in [facility] in March 2024 preop testing for vascular surgery and was found to be abnormal.  She was recently seen 3 weeks ago when she did not mention anything about stress test being done in an outside hospital.          Palpitations    The history is provided by the medical records and patient (PAF on ICD interrogation). Associated symptoms include lower extremity edema. Pertinent negatives include no fever, no malaise/fatigue, no chest pain, no claudication, no orthopnea, no PND, no nausea, no vomiting, no headaches, no dizziness, no cough and no shortness of breath. Her past medical history is significant for hypertension. Hypertension   The history is provided by the medical records. This is a chronic problem. Pertinent negatives include no chest pain, no headaches and no shortness of breath. Cholesterol Problem   The history is provided by the medical records. This is a chronic problem. Pertinent negatives include no chest pain, no headaches and no shortness of breath. Leg Swelling   The history is provided by the patient. This is a new problem. The current episode started more than 1 week ago (2-3 wks). The problem has been gradually improving. Pertinent negatives include no chest pain, no headaches and no shortness of breath. The symptoms are aggravated by standing. The symptoms are relieved by sleep and medications. Review of Systems   Constitutional: Negative for chills, fever, malaise/fatigue and weight loss. HENT: Negative for nosebleeds. Eyes: Negative for discharge. Respiratory: Negative for cough, shortness of breath and wheezing. Cardiovascular: Positive for palpitations (PAF) and leg swelling. Negative for chest pain, orthopnea, claudication and PND. Gastrointestinal: Negative for diarrhea, nausea and vomiting. Genitourinary: Negative for dysuria and hematuria. Musculoskeletal: Negative for joint pain. Skin: Negative for rash. Neurological: Negative for dizziness, seizures, loss of consciousness and headaches. Endo/Heme/Allergies: Negative for polydipsia. Does not bruise/bleed easily. Psychiatric/Behavioral: Negative for depression and substance abuse. The patient does not have insomnia. Allergies   Allergen Reactions    Losartan Angioedema     Tongue swelling but not on lips       Past Medical History:   Diagnosis Date    Arthritis     Automatic implantable cardiac defibrillator in situ     Coronary atherosclerosis of native coronary artery     CABG 10/02 with LIMA to LAD and SVG to RCA    Depression     Essential hypertension     Headache(784.0)     Other and unspecified hyperlipidemia     9/12 Low density lipoproteins are at goal, triglycerides are at goal, high density lipoproteins are at goal.    Postsurgical aortocoronary bypass status 2002    Presence of permanent cardiac pacemaker-DDD 11/6/2015    OFFICE CHECK : 8/14 nl function, +AHR rare(changed detection to over 150/mt); 11/15 nl function     Unspecified arthropathy, lower leg     S/P Left knee replacement. Had repeat surgery's for her left knee.  UTI (urinary tract infection)        Family History   Problem Relation Age of Onset    Arthritis-osteo Other         not specified    Hypertension Other         not specified    Sudden Death Neg Hx     Heart Attack Neg Hx        Social History     Tobacco Use    Smoking status: Current Some Day Smoker     Packs/day: 0.05    Smokeless tobacco: Never Used    Tobacco comment:  1 or 2 puff at night before bed   Substance Use Topics    Alcohol use: No    Drug use: Not on file        Current Outpatient Medications   Medication Sig    doxazosin (CARDURA) 4 mg tablet Take 1 Tab by mouth nightly.  .    furosemide (LASIX) 20 mg tablet take 1 tablet by mouth once daily if needed for edema    metoprolol tartrate (LOPRESSOR) 100 mg IR tablet take 1 tablet by mouth twice a day    multivitamin (ONE A DAY) tablet Take 1 Tab by mouth daily.  cyanocobalamin (VITAMIN B-12) 1,000 mcg sublingual tablet Take 1,000 mcg by mouth every seven (7) days.  ELIQUIS 5 mg tablet take 1 tablet by mouth twice a day    acetaminophen (TYLENOL) 500 mg tablet Take  by mouth every six (6) hours as needed for Pain.  ezetimibe (ZETIA) 10 mg tablet Take  by mouth daily.  rosuvastatin (CRESTOR) 40 mg tablet Take 1 Tab by mouth nightly.  amLODIPine (NORVASC) 10 mg tablet Take 10 mg by mouth daily. No current facility-administered medications for this visit. Past Surgical History:   Procedure Laterality Date    CARDIAC SURG PROCEDURE UNLIST  11/09    gen change-obici    HX CORONARY ARTERY BYPASS GRAFT  10/2002    HX ORTHOPAEDIC  [id number]    Reduced two component revision using the Sigma System. Lateral release. Suture anchor repair of medial collateral ligament. Angela Bowie  [id number]    Left knee arthrofibrosis, status post significant scarring, status post left total knee replacement    VASCULAR SURGERY PROCEDURE UNLIST  2005    Aorto biffem.   Dr. Eileen Nguyễn       Diagnostic Studies:  CARDIOLOGY STUDIES 10/5/2017   Pharmacological Nuclear Stress Test Result mild small infbasal ischemia, 73%EF   Some recent data might be hidden     3/20 nuclear stress test  MARIANN REST/STRESS MULTIPLE [facility]  Component Name Value Ref Range   EF Nuc 52     Result Impression    THIS MYOCARDIAL PERFUSION STUDY IS ABNORMAL   THIS IS A HIGH RISK STUDY   ABNORMAL NUCLEAR STUDY SUGGESTIVE OF TWO-VESSEL DISEASE     ON INITIAL STRESS IMAGES THERE IS A MODERATE TO LARGE AREA OF DIMINISHED UPTAKE IN THE ISOTOPE   THROUGHOUT THE ANTERIOR SEPTAL AND THE LATERAL WALL     ON THE REST IMAGES THERE IS ENHANCED UPTAKE THROUGHOUT THE ANTERIOR SEPTAL AND LATERAL WALL     THE GATED ACQUISITION SHOWS LATERAL WALL HYPOKINESIS AND AN EJECTION FRACTION ESTIMATED AT   50%     THE ABOVE FINDINGS ARE CONSISTENT WITH TWO-VESSEL ISCHEMIA INVOLVING THE PROXIMAL LAD AND   CIRCUMFLEX ARTERY       Visit Vitals  /65   Pulse (!) 59   Temp 97.2 °F (36.2 °C) (Temporal)   Ht 5' 10\" (1.778 m)   Wt 81.6 kg (180 lb)   BMI 25.83 kg/m²       Ms. Raji Alarcon has a reminder for a \"due or due soon\" health maintenance. I have asked that she contact her primary care provider for follow-up on this health maintenance. Physical Exam   Constitutional: She is oriented to person, place, and time. She appears well-developed and well-nourished. No distress. kyphosis   HENT:   Head: Normocephalic and atraumatic. Eyes: Right eye exhibits no discharge. Left eye exhibits no discharge. No scleral icterus. Neck: Neck supple. No JVD present. Carotid bruit is not present. No thyromegaly present. Cardiovascular: Normal rate, regular rhythm, S1 normal, S2 normal, normal heart sounds and intact distal pulses. Exam reveals no gallop and no friction rub. No murmur heard. Pulmonary/Chest: Effort normal and breath sounds normal. She has no wheezes. She has no rales. Abdominal: Soft. She exhibits no mass. There is no abdominal tenderness. Musculoskeletal:         General: Edema (trace right) present. Comments: Cant put legs straight   Neurological: She is alert and oriented to person, place, and time. Skin: Skin is warm and dry. No rash noted. Psychiatric: She has a normal mood and affect.  Her behavior is normal.   wounds healing well left subclavicular area     ASSESSMENT and PLAN      AFib / Flutter seen on interrogation of ICD  Started Eliquis 5 mg / BID  HLD: 5/17 LDL69; 2/16 LDL81;   LIPID COMPLETE PANELResulted: 2/7/2020 4:51 AM  [email or web address]  Component Name Value Ref Range   Cholesterol 129 110 - 200 mg/dL   Triglyceride 73 40 - 149 mg/dL   HDL 55 >=40 mg/dL Cholesterol/HDL 2.3 0.0 - 5.0    Non-HDL Cholesterol 74 <130 mg/dL   LDL CALCULATION 59 50 - 99 mg/dL   VLDL CALCULATION 15 8 - 30 mg/dL   LDL/HDL Ratio 1.1           BiV ICD: gen change 11/09; 3/17  CARE LINK 10/10; 8/12; 4/13; 8/13; 2/16; 5/16; 8/16  nl function, nl volume; 2/17 nl function; DONNY; 8/17; 1/19; 11/19 nl function, nl volume; 2/20 normal function, normal volume, PAF; 6/20 normal function, recent increase in volume;  OFFICE CHECK : 8/14 nl function, +AHR rare(changed detection to over 150/mt); 11/15; 11/16; 3/17 nl function, nl vol; 4/18 nl function, frequent AHR(likely AFlutter), nl volume; Normal function, normal volume, AT/AF episodes are far field sensing events. 6/1/2020 blood pressure is elevated. Increase Cardura to 8 mg at bedtime. Patient cannot check her blood pressures at home even though she has a machine. Continue Eliquis due to frequent AT/A. fib on ICD interrogation. Follow-up [facility] every 3 months. Office check in 6 months  Stable CAD. Continue present medications. She has developed mild edema on the right leg but recently had a lot of stress and likely dietary indiscretion due to sister in the hospital and eventually dying in the hospital.  Patient trying to stop smoking and she was congratulated for that. Hopefully she will discontinue very soon. She has cut down the smoking significantly. Lasix helping well but she does not like to take it daily. Diagnoses and all orders for this visit:    1. Paroxysmal atrial fibrillation (HCC)    2. Essential hypertension, benign  -     doxazosin (CARDURA) 8 mg tablet; Take 1 Tab by mouth nightly. .    3. Coronary artery disease involving coronary bypass graft of native heart without angina pectoris    4. ICD (implantable cardioverter-defibrillator) in place-DDD ICD    5. Mixed hyperlipidemia    6. Postsurgical aortocoronary bypass status    7.  Tobacco abuse counseling        Pertinent laboratory and test data reviewed and discussed with patient. See patient instructions also for other medical advice given    There are no discontinued medications. Follow-up and Dispositions    · Return in about 3 months (around 11/3/2020), or if symptoms worsen or fail to improve.

## 2020-08-03 NOTE — PROGRESS NOTES
1. Have you been to the ER, urgent care clinic since your last visit? Hospitalized since your last visit?     no  2. Have you seen or consulted any other health care providers outside of the [address] [place] [address] since your last visit? Include any pap smears or colon screening. No     3. Since your last visit, have you had any of the following symptoms? no     4. Have you had any blood work, X-rays or cardiac testing? No       5. Where do you normally have your labs drawn? [facility]  6. Do you need any refills today?    no

## 2020-08-28 RX ORDER — FUROSEMIDE 20 MG/1
TABLET ORAL
Qty: 30 TAB | Refills: 0 | Status: SHIPPED | OUTPATIENT
Start: 2020-08-28

## 2020-09-13 DIAGNOSIS — I48.0 PAROXYSMAL ATRIAL FIBRILLATION (HCC): ICD-10-CM

## 2020-09-14 RX ORDER — APIXABAN 5 MG/1
TABLET, FILM COATED ORAL
Qty: 180 TAB | Refills: 3 | Status: SHIPPED | OUTPATIENT
Start: 2020-09-14 | End: 2021-03-12 | Stop reason: SDUPTHER

## 2020-10-01 DIAGNOSIS — I25.10 ATHEROSCLEROSIS OF NATIVE CORONARY ARTERY OF NATIVE HEART WITHOUT ANGINA PECTORIS: ICD-10-CM

## 2020-10-01 DIAGNOSIS — I10 ESSENTIAL HYPERTENSION, BENIGN: ICD-10-CM

## 2020-10-01 RX ORDER — METOPROLOL TARTRATE 100 MG/1
100 TABLET ORAL 2 TIMES DAILY
Qty: 180 TAB | Refills: 1 | Status: SHIPPED | COMMUNITY
Start: 2020-10-01 | End: 2020-10-02

## 2020-10-02 DIAGNOSIS — I25.10 ATHEROSCLEROSIS OF NATIVE CORONARY ARTERY OF NATIVE HEART WITHOUT ANGINA PECTORIS: ICD-10-CM

## 2020-10-02 DIAGNOSIS — I10 ESSENTIAL HYPERTENSION, BENIGN: ICD-10-CM

## 2020-10-02 RX ORDER — METOPROLOL TARTRATE 100 MG/1
TABLET ORAL
Qty: 180 TAB | Refills: 1 | Status: SHIPPED | OUTPATIENT
Start: 2020-10-02 | End: 2020-10-05 | Stop reason: SDUPTHER

## 2020-10-05 DIAGNOSIS — I10 ESSENTIAL HYPERTENSION, BENIGN: ICD-10-CM

## 2020-10-05 DIAGNOSIS — I25.10 ATHEROSCLEROSIS OF NATIVE CORONARY ARTERY OF NATIVE HEART WITHOUT ANGINA PECTORIS: ICD-10-CM

## 2020-10-05 RX ORDER — METOPROLOL TARTRATE 100 MG/1
100 TABLET ORAL 2 TIMES DAILY
Qty: 180 TAB | Refills: 1 | Status: SHIPPED | OUTPATIENT
Start: 2020-10-05 | End: 2020-10-09 | Stop reason: SDUPTHER

## 2020-10-05 RX ORDER — DOXAZOSIN 8 MG/1
8 TABLET ORAL
Qty: 90 TAB | Refills: 1 | Status: SHIPPED | OUTPATIENT
Start: 2020-10-05 | End: 2020-10-09 | Stop reason: SDUPTHER

## 2020-10-09 DIAGNOSIS — I25.10 ATHEROSCLEROSIS OF NATIVE CORONARY ARTERY OF NATIVE HEART WITHOUT ANGINA PECTORIS: ICD-10-CM

## 2020-10-09 DIAGNOSIS — I10 ESSENTIAL HYPERTENSION, BENIGN: ICD-10-CM

## 2020-10-09 RX ORDER — DOXAZOSIN 8 MG/1
8 TABLET ORAL
Qty: 90 TAB | Refills: 1 | Status: SHIPPED | OUTPATIENT
Start: 2020-10-09 | End: 2021-03-12 | Stop reason: SDUPTHER

## 2020-10-09 RX ORDER — METOPROLOL TARTRATE 100 MG/1
100 TABLET ORAL 2 TIMES DAILY
Qty: 180 TAB | Refills: 1 | Status: SHIPPED | OUTPATIENT
Start: 2020-10-09 | End: 2021-03-24 | Stop reason: SDUPTHER

## 2021-03-12 DIAGNOSIS — I48.0 PAROXYSMAL ATRIAL FIBRILLATION (HCC): ICD-10-CM

## 2021-03-12 DIAGNOSIS — I10 ESSENTIAL HYPERTENSION, BENIGN: ICD-10-CM

## 2021-03-12 RX ORDER — DOXAZOSIN 8 MG/1
8 TABLET ORAL
Qty: 90 TAB | Refills: 3 | Status: SHIPPED | OUTPATIENT
Start: 2021-03-12 | End: 2021-03-19

## 2021-03-19 ENCOUNTER — OFFICE VISIT (OUTPATIENT)
Dept: CARDIOLOGY CLINIC | Age: 71
End: 2021-03-19
Payer: MEDICARE

## 2021-03-19 VITALS
HEIGHT: 70 IN | SYSTOLIC BLOOD PRESSURE: 147 MMHG | WEIGHT: 183 LBS | HEART RATE: 58 BPM | TEMPERATURE: 97.9 F | DIASTOLIC BLOOD PRESSURE: 70 MMHG | BODY MASS INDEX: 26.2 KG/M2

## 2021-03-19 DIAGNOSIS — Z71.6 TOBACCO ABUSE COUNSELING: ICD-10-CM

## 2021-03-19 DIAGNOSIS — Z95.810 ICD (IMPLANTABLE CARDIOVERTER-DEFIBRILLATOR) IN PLACE: ICD-10-CM

## 2021-03-19 DIAGNOSIS — I25.810 CORONARY ARTERY DISEASE INVOLVING CORONARY BYPASS GRAFT OF NATIVE HEART WITHOUT ANGINA PECTORIS: Primary | ICD-10-CM

## 2021-03-19 DIAGNOSIS — I10 ESSENTIAL HYPERTENSION, BENIGN: ICD-10-CM

## 2021-03-19 DIAGNOSIS — Z95.1 POSTSURGICAL AORTOCORONARY BYPASS STATUS: ICD-10-CM

## 2021-03-19 DIAGNOSIS — I48.0 PAROXYSMAL ATRIAL FIBRILLATION (HCC): ICD-10-CM

## 2021-03-19 DIAGNOSIS — E78.2 MIXED HYPERLIPIDEMIA: ICD-10-CM

## 2021-03-19 PROCEDURE — 93000 ELECTROCARDIOGRAM COMPLETE: CPT | Performed by: INTERNAL MEDICINE

## 2021-03-19 PROCEDURE — 1090F PRES/ABSN URINE INCON ASSESS: CPT | Performed by: INTERNAL MEDICINE

## 2021-03-19 PROCEDURE — 99214 OFFICE O/P EST MOD 30 MIN: CPT | Performed by: INTERNAL MEDICINE

## 2021-03-19 PROCEDURE — G8427 DOCREV CUR MEDS BY ELIG CLIN: HCPCS | Performed by: INTERNAL MEDICINE

## 2021-03-19 PROCEDURE — G8400 PT W/DXA NO RESULTS DOC: HCPCS | Performed by: INTERNAL MEDICINE

## 2021-03-19 PROCEDURE — 1101F PT FALLS ASSESS-DOCD LE1/YR: CPT | Performed by: INTERNAL MEDICINE

## 2021-03-19 PROCEDURE — 3017F COLORECTAL CA SCREEN DOC REV: CPT | Performed by: INTERNAL MEDICINE

## 2021-03-19 PROCEDURE — G8419 CALC BMI OUT NRM PARAM NOF/U: HCPCS | Performed by: INTERNAL MEDICINE

## 2021-03-19 PROCEDURE — G8536 NO DOC ELDER MAL SCRN: HCPCS | Performed by: INTERNAL MEDICINE

## 2021-03-19 PROCEDURE — G8754 DIAS BP LESS 90: HCPCS | Performed by: INTERNAL MEDICINE

## 2021-03-19 PROCEDURE — G8753 SYS BP > OR = 140: HCPCS | Performed by: INTERNAL MEDICINE

## 2021-03-19 PROCEDURE — G8432 DEP SCR NOT DOC, RNG: HCPCS | Performed by: INTERNAL MEDICINE

## 2021-03-19 RX ORDER — DOXAZOSIN 8 MG/1
8 TABLET ORAL 2 TIMES DAILY
Qty: 180 TAB | Refills: 1
Start: 2021-03-19 | End: 2021-06-08 | Stop reason: SDUPTHER

## 2021-03-19 NOTE — PATIENT INSTRUCTIONS
There are no discontinued medications. please get remote checks for pacer or ICD every 3 months and Office check in 1 yr Please call our office after every transmission to confirm success of transmission After the recommended changes have been made in blood pressure medicines, patient advised to keep BP/HR(pulse rate) chart twice daily and bring us results in next 4 to 5 days. Patient may send the results via \"My Chart\" if desired. Please rest for 5-10 minutes before checking blood pressure. Sit on a comfortable chair without crossing the legs and put your arm on a table. We recommend that you use an upper arm cuff. Check the blood pressure 3 times each time you check the blood pressure and record the lowest reading. If you check the blood pressure in both arms, use the higher reading. Learning About Benefits From Quitting Smoking How does quitting smoking make you healthier? If you're thinking about quitting smoking, you may have a few reasons to be smoke-free. Your health may be one of them. · When you quit smoking, you lower your risks for cancer, lung disease, heart attack, stroke, blood vessel disease, and blindness from macular degeneration. · When you're smoke-free, you get sick less often, and you heal faster. You are less likely to get colds, flu, bronchitis, and pneumonia. · As a nonsmoker, you may find that your mood is better and you are less stressed. When and how will you feel healthier? Quitting has real health benefits that start from day 1 of being smoke-free. And the longer you stay smoke-free, the healthier you get and the better you feel. The first hours · After just 20 minutes, your blood pressure and heart rate go down. That means there's less stress on your heart and blood vessels. · Within 12 hours, the level of carbon monoxide in your blood drops back to normal. That makes room for more oxygen.  With more oxygen in your body, you may notice that you have more energy than when you smoked. After 2 weeks · Your lungs start to work better. · Your risk of heart attack starts to drop. After 1 month · When your lungs are clear, you cough less and breathe deeper, so it's easier to be active. · Your sense of taste and smell return. That means you can enjoy food more than you have since you started smoking. Over the years · Over the years, your risks of heart disease, heart attack, and stroke are lower. · After 10 years, your risk of dying from lung cancer is cut by about half. And your risk for many other types of cancer is lower too. How would quitting help others in your life? When you quit smoking, you improve the health of everyone who now breathes in your smoke. · Their heart, lung, and cancer risks drop, much like yours. · They are sick less. For babies and small children, living smoke-free means they're less likely to have ear infections, pneumonia, and bronchitis. · If you're a woman who is or will be pregnant someday, quitting smoking means a healthier . · Children who are close to you are less likely to become adult smokers. Where can you learn more? Go to http://www.Ak?Lex.com/ Enter 052 [id number] [id number] 11 in the search box to learn more about \"Learning About Benefits From Quitting Smoking. \" Current as of: 2020               Content Version: 12.6 © 6599-4520 [employer]. Care instructions adapted under license by [employer] (which disclaims liability or warranty for this information). If you have questions about a medical condition or this instruction, always ask your healthcare professional. [place] [address] any warranty or liability for your use of this information.

## 2021-03-19 NOTE — PROGRESS NOTES
HISTORY OF PRESENT ILLNESS  Bessy Flores is a 65 y.o. female. Follow-up of CAD, status post CABG, hypertension, paroxysmal A. fib, status post ICD, hyperlipidemia. 6/20 seen for abnormal stress test which was done in [facility] in March 2024 preop testing for vascular surgery and was found to be abnormal.  She was recently seen 3 weeks ago when she did not mention anything about stress test being done in an outside hospital.          Palpitations   The history is provided by the medical records (PAF on ICD interrogation). Associated symptoms include lower extremity edema. Pertinent negatives include no fever, no malaise/fatigue, no chest pain, no claudication, no orthopnea, no PND, no nausea, no vomiting, no headaches, no dizziness, no cough and no shortness of breath. Her past medical history is significant for hypertension. Hypertension  The history is provided by the medical records. This is a chronic problem. Pertinent negatives include no chest pain, no headaches and no shortness of breath. Cholesterol Problem  The history is provided by the medical records. This is a chronic problem. Pertinent negatives include no chest pain, no headaches and no shortness of breath. Leg Swelling  The history is provided by the patient. This is a new problem. The current episode started more than 1 week ago (2-3 wks). The problem has been resolved. Pertinent negatives include no chest pain, no headaches and no shortness of breath. The symptoms are aggravated by standing. The symptoms are relieved by sleep and medications. Review of Systems   Constitutional: Negative for chills, fever, malaise/fatigue and weight loss. HENT: Negative for nosebleeds. Eyes: Negative for discharge. Respiratory: Negative for cough, shortness of breath and wheezing. Cardiovascular: Positive for palpitations (PAF on ICD check). Negative for chest pain, orthopnea, claudication, leg swelling and PND.    Gastrointestinal: Negative for diarrhea, nausea and vomiting. Genitourinary: Negative for dysuria and hematuria. Musculoskeletal: Negative for joint pain. Skin: Negative for rash. Neurological: Negative for dizziness, seizures, loss of consciousness and headaches. Endo/Heme/Allergies: Negative for polydipsia. Does not bruise/bleed easily. Psychiatric/Behavioral: Negative for depression and substance abuse. The patient does not have insomnia. Allergies   Allergen Reactions    Losartan Angioedema     Tongue swelling but not on lips       Past Medical History:   Diagnosis Date    Arthritis     Automatic implantable cardiac defibrillator in situ     Coronary atherosclerosis of native coronary artery     CABG 10/02 with LIMA to LAD and SVG to RCA    Depression     Essential hypertension     Headache(784.0)     Other and unspecified hyperlipidemia     9/12 Low density lipoproteins are at goal, triglycerides are at goal, high density lipoproteins are at goal.    Postsurgical aortocoronary bypass status 2002    Presence of permanent cardiac pacemaker-DDD 11/6/2015    OFFICE CHECK : 8/14 nl function, +AHR rare(changed detection to over 150/mt); 11/15 nl function     Unspecified arthropathy, lower leg     S/P Left knee replacement. Had repeat surgery's for her left knee.     UTI (urinary tract infection)        Family History   Problem Relation Age of Onset    Arthritis-osteo Other         not specified    Hypertension Other         not specified    Sudden Death Neg Hx     Heart Attack Neg Hx        Social History     Tobacco Use    Smoking status: Current Some Day Smoker     Packs/day: 0.05    Smokeless tobacco: Never Used    Tobacco comment:  1 or 2 puff at night before bed   Substance Use Topics    Alcohol use: No    Drug use: Not on file        Current Outpatient Medications   Medication Sig    apixaban (Eliquis) 5 mg tablet take 1 tablet by mouth twice a day    doxazosin (CARDURA) 8 mg tablet Take 1 Tab by mouth nightly. Cali[employer]o Mail metoprolol tartrate (LOPRESSOR) 100 mg IR tablet Take 1 Tab by mouth two (2) times a day.  furosemide (LASIX) 20 mg tablet take 1 tablet by mouth once daily if needed for edema    multivitamin (ONE A DAY) tablet Take 1 Tab by mouth daily.  cyanocobalamin (VITAMIN B-12) 1,000 mcg sublingual tablet Take 1,000 mcg by mouth. Every other friday    acetaminophen (TYLENOL) 500 mg tablet Take  by mouth every six (6) hours as needed for Pain.  ezetimibe (ZETIA) 10 mg tablet Take  by mouth daily.  rosuvastatin (CRESTOR) 40 mg tablet Take 1 Tab by mouth nightly.  amLODIPine (NORVASC) 10 mg tablet Take 10 mg by mouth daily. No current facility-administered medications for this visit. Past Surgical History:   Procedure Laterality Date    CARDIAC SURG PROCEDURE UNLIST  11/09    gen change-obici    HX CORONARY ARTERY BYPASS GRAFT  10/2002    HX ORTHOPAEDIC  [id number]    Reduced two component revision using the Sigma System. Lateral release. Suture anchor repair of medial collateral ligament. Larissa Georgia  [id number]    Left knee arthrofibrosis, status post significant scarring, status post left total knee replacement    VASCULAR SURGERY PROCEDURE UNLIST  2005    Aorto biffem.   Dr. Soni Galvan       Diagnostic Studies:  CARDIOLOGY STUDIES 10/5/2017   Pharmacological Nuclear Stress Test Result mild small infbasal ischemia, 73%EF   Some recent data might be hidden     3/20 nuclear stress test  MARIANN REST/STRESS MULTIPLE [facility]  Component Name Value Ref Range   EF Nuc 52     Result Impression    THIS MYOCARDIAL PERFUSION STUDY IS ABNORMAL   THIS IS A HIGH RISK STUDY   ABNORMAL NUCLEAR STUDY SUGGESTIVE OF TWO-VESSEL DISEASE     ON INITIAL STRESS IMAGES THERE IS A MODERATE TO LARGE AREA OF DIMINISHED UPTAKE IN THE ISOTOPE   THROUGHOUT THE ANTERIOR SEPTAL AND THE LATERAL WALL     ON THE REST IMAGES THERE IS ENHANCED UPTAKE THROUGHOUT THE ANTERIOR SEPTAL AND LATERAL WALL     THE GATED ACQUISITION SHOWS LATERAL WALL HYPOKINESIS AND AN EJECTION FRACTION ESTIMATED AT   50%     THE ABOVE FINDINGS ARE CONSISTENT WITH TWO-VESSEL ISCHEMIA INVOLVING THE PROXIMAL LAD AND   CIRCUMFLEX ARTERY       Visit Vitals  BP (!) 147/70   Pulse (!) 58   Temp 97.9 °F (36.6 °C) (Temporal)   Ht 5' 10\" (1.778 m)   Wt 83 kg (183 lb)   BMI 26.26 kg/m²       Ms. Jaye Nissen has a reminder for a \"due or due soon\" health maintenance. I have asked that she contact her primary care provider for follow-up on this health maintenance. Physical Exam   Constitutional: She is oriented to person, place, and time. She appears well-developed and well-nourished. No distress. kyphosis   HENT:   Head: Normocephalic and atraumatic. Eyes: Right eye exhibits no discharge. Left eye exhibits no discharge. No scleral icterus. Neck: Neck supple. No JVD present. Carotid bruit is not present. No thyromegaly present. Cardiovascular: Normal rate, regular rhythm, S1 normal, S2 normal, normal heart sounds and intact distal pulses. Exam reveals no gallop and no friction rub. No murmur heard. Pulmonary/Chest: Effort normal and breath sounds normal. She has no wheezes. She has no rales. Abdominal: Soft. She exhibits no mass. There is no abdominal tenderness. Musculoskeletal:         General: Edema (trace right) present. Comments: Cant put legs straight   Neurological: She is alert and oriented to person, place, and time. Skin: Skin is warm and dry. No rash noted. Psychiatric: She has a normal mood and affect.  Her behavior is normal.   wounds healing well left subclavicular area     ASSESSMENT and PLAN      AFib / Flutter seen on interrogation of ICD  Started Eliquis 5 mg / BID  HLD: 5/17 LDL69; 2/16 LDL81;   LIPID COMPLETE PANELResulted: 2/7/2020 4:51 AM  [employer]  Component Name Value Ref Range   Cholesterol 129 110 - 200 mg/dL   Triglyceride 73 40 - 149 mg/dL   HDL 55 >=40 mg/dL Cholesterol/HDL 2.3 0.0 - 5.0    Non-HDL Cholesterol 74 <130 mg/dL   LDL CALCULATION 59 50 - 99 mg/dL   VLDL CALCULATION 15 8 - 30 mg/dL   LDL/HDL Ratio 1.1           BiV ICD: gen change 11/09; 3/17  CARE LINK 10/10; 8/12; 4/13; 8/13; 2/16; 5/16; 8/16  nl function, nl volume; 2/17 nl function; DONNY; 8/17; 1/19; 11/19 nl function, nl volume; 2/20 normal function, normal volume, PAF; 6/20 normal function, recent increase in volume;  OFFICE CHECK : 8/14 nl function, +AHR rare(changed detection to over 150/mt); 11/15; 11/16; 3/17 nl function, nl vol; 4/18 nl function, frequent AHR(likely AFlutter), nl volume; Normal function, normal volume, AT/AF episodes are far field sensing events. 6/1/2020 blood pressure is elevated. Increase Cardura to 8 mg at bedtime. Patient cannot check her blood pressures at home even though she has a machine. Continue Eliquis due to frequent AT/A. fib on ICD interrogation. Follow-up [facility] every 3 months. Office check in 6 months  Stable CAD. Continue present medications. She has developed mild edema on the right leg but recently had a lot of stress and likely dietary indiscretion due to sister in the hospital and eventually dying in the hospital.  Patient trying to stop smoking and she was congratulated for that. Hopefully she will discontinue very soon. She has cut down the smoking significantly. Lasix helping well but she does not like to take it daily. Diagnoses and all orders for this visit:    1. Coronary artery disease involving coronary bypass graft of native heart without angina pectoris    2. Paroxysmal atrial fibrillation (HCC)  -     AMB POC EKG ROUTINE W/ 12 LEADS, INTER & REP    3. Essential hypertension, benign  -     doxazosin (CARDURA) 8 mg tablet; Take 1 Tab by mouth two (2) times a day. .    4. ICD (implantable cardioverter-defibrillator) in place-DDD ICD    5. Mixed hyperlipidemia  -     LIPID PANEL;  Future  -     HEPATIC FUNCTION PANEL; Future    6. Postsurgical aortocoronary bypass status    7. Tobacco abuse counseling        Pertinent laboratory and test data reviewed and discussed with patient. See patient instructions also for other medical advice given    Medications Discontinued During This Encounter   Medication Reason    doxazosin (CARDURA) 8 mg tablet        Follow-up and Dispositions    · Return in about 1 week (around 3/26/2021), or if symptoms worsen or fail to improve, for BP log x 4-5 days post med changes, 1 wk with David. 2/19/2021 CAD stable. Continue same medications  BP is elevated. Increase doxazosin to 8 mg twice daily. Follow home chart and visit with nurse practitioner 1 week approximately to check on the pressure. Check [facility] ASAP, preferably through today. Continue Eliquis for paroxysmal A. fib. Routine labs to check liver and lipids  Tobacco cessation discussed and reinforced again. She says she smokes 3 cigarettes a day and does not inhale.

## 2021-03-19 NOTE — PROGRESS NOTES
1. Have you been to the ER, urgent care clinic since your last visit? Hospitalized since your last visit?     no  2. Have you seen or consulted any other health care providers outside of the [address] [place] [address] since your last visit? Include any pap smears or colon screening. No     3. Since your last visit, have you had any of the following symptoms? no       4. Have you had any blood work, X-rays or cardiac testing? Yes Where: Dr. Mindy Isaacs     5. Where do you normally have your labs drawn? 6. Do you need any refills today?    no

## 2021-03-20 LAB
ALBUMIN SERPL-MCNC: 4.2 G/DL (ref 3.7–4.7)
ALP SERPL-CCNC: 82 IU/L (ref 39–117)
ALT SERPL-CCNC: 23 IU/L (ref 0–32)
AST SERPL-CCNC: 36 IU/L (ref 0–40)
BILIRUB DIRECT SERPL-MCNC: 0.15 MG/DL (ref 0–0.4)
BILIRUB SERPL-MCNC: 0.6 MG/DL (ref 0–1.2)
CHOLEST SERPL-MCNC: 121 MG/DL (ref 100–199)
HDLC SERPL-MCNC: 46 MG/DL
LDLC SERPL CALC-MCNC: 61 MG/DL (ref 0–99)
PROT SERPL-MCNC: 7.4 G/DL (ref 6–8.5)
TRIGL SERPL-MCNC: 69 MG/DL (ref 0–149)
VLDLC SERPL CALC-MCNC: 14 MG/DL (ref 5–40)

## 2021-03-24 DIAGNOSIS — I25.10 ATHEROSCLEROSIS OF NATIVE CORONARY ARTERY OF NATIVE HEART WITHOUT ANGINA PECTORIS: ICD-10-CM

## 2021-03-24 DIAGNOSIS — I10 ESSENTIAL HYPERTENSION, BENIGN: ICD-10-CM

## 2021-03-24 RX ORDER — METOPROLOL TARTRATE 100 MG/1
100 TABLET ORAL 2 TIMES DAILY
Qty: 180 TAB | Refills: 3 | Status: SHIPPED | OUTPATIENT
Start: 2021-03-24 | End: 2021-11-09

## 2021-03-25 ENCOUNTER — OFFICE VISIT (OUTPATIENT)
Dept: CARDIOLOGY CLINIC | Age: 71
End: 2021-03-25
Payer: MEDICARE

## 2021-03-25 VITALS
SYSTOLIC BLOOD PRESSURE: 138 MMHG | BODY MASS INDEX: 27.06 KG/M2 | DIASTOLIC BLOOD PRESSURE: 82 MMHG | WEIGHT: 189 LBS | HEIGHT: 70 IN | TEMPERATURE: 97 F

## 2021-03-25 DIAGNOSIS — I25.10 ATHEROSCLEROSIS OF NATIVE CORONARY ARTERY OF NATIVE HEART WITHOUT ANGINA PECTORIS: Primary | ICD-10-CM

## 2021-03-25 DIAGNOSIS — I10 ESSENTIAL HYPERTENSION, BENIGN: ICD-10-CM

## 2021-03-25 DIAGNOSIS — Z71.6 TOBACCO ABUSE COUNSELING: ICD-10-CM

## 2021-03-25 DIAGNOSIS — Z95.1 POSTSURGICAL AORTOCORONARY BYPASS STATUS: ICD-10-CM

## 2021-03-25 DIAGNOSIS — E78.2 MIXED HYPERLIPIDEMIA: ICD-10-CM

## 2021-03-25 DIAGNOSIS — Z95.810 ICD (IMPLANTABLE CARDIOVERTER-DEFIBRILLATOR) IN PLACE: ICD-10-CM

## 2021-03-25 DIAGNOSIS — I48.0 PAROXYSMAL ATRIAL FIBRILLATION (HCC): ICD-10-CM

## 2021-03-25 PROCEDURE — 99214 OFFICE O/P EST MOD 30 MIN: CPT | Performed by: NURSE PRACTITIONER

## 2021-03-25 NOTE — PROGRESS NOTES
1. Have you been to the ER, urgent care clinic since your last visit? Hospitalized since your last visit? no    2. Have you seen or consulted any other health care providers outside of the [address] [place] [address] since your last visit? Include any pap smears or colon screening.  no

## 2021-03-25 NOTE — PROGRESS NOTES
HISTORY OF PRESENT ILLNESS  Sandra Snellen is a 65 y.o. female. Follow-up of CAD, status post CABG, hypertension, paroxysmal A. fib, status post ICD, hyperlipidemia. 6/20 seen for abnormal stress test which was done in [facility] in March 2024 preop testing for vascular surgery and was found to be abnormal.  She was recently seen 3 weeks ago when she did not mention anything about stress test being done in an outside hospital.    3/2025 patient presents to follow-up for hypertension and lab results      Palpitations   The history is provided by the medical records (PAF on ICD interrogation). Associated symptoms include lower extremity edema. Pertinent negatives include no fever, no malaise/fatigue, no chest pain, no claudication, no orthopnea, no PND, no nausea, no vomiting, no headaches, no dizziness, no cough and no shortness of breath. Her past medical history is significant for hypertension. Hypertension  The history is provided by the medical records. This is a chronic problem. Pertinent negatives include no chest pain, no headaches and no shortness of breath. Cholesterol Problem  The history is provided by the medical records. This is a chronic problem. Pertinent negatives include no chest pain, no headaches and no shortness of breath. Leg Swelling  The history is provided by the patient. This is a new problem. The current episode started more than 1 week ago (2-3 wks). The problem has been resolved. Pertinent negatives include no chest pain, no headaches and no shortness of breath. The symptoms are aggravated by standing. The symptoms are relieved by sleep and medications. Review of Systems   Constitutional: Negative for chills, fever, malaise/fatigue and weight loss. HENT: Negative for nosebleeds. Eyes: Negative for discharge. Respiratory: Negative for cough, shortness of breath and wheezing.     Cardiovascular: Negative for chest pain, palpitations (PAF on ICD check), orthopnea, claudication, leg swelling and PND. Gastrointestinal: Negative for diarrhea, nausea and vomiting. Genitourinary: Negative for dysuria and hematuria. Musculoskeletal: Negative for joint pain. Skin: Negative for rash. Neurological: Negative for dizziness, seizures, loss of consciousness and headaches. Endo/Heme/Allergies: Negative for polydipsia. Does not bruise/bleed easily. Psychiatric/Behavioral: Negative for depression and substance abuse. The patient does not have insomnia. Allergies   Allergen Reactions    Losartan Angioedema     Tongue swelling but not on lips       Past Medical History:   Diagnosis Date    Arthritis     Automatic implantable cardiac defibrillator in situ     Coronary atherosclerosis of native coronary artery     CABG 10/02 with LIMA to LAD and SVG to RCA    Depression     Essential hypertension     Headache(784.0)     Other and unspecified hyperlipidemia     9/12 Low density lipoproteins are at goal, triglycerides are at goal, high density lipoproteins are at goal.    Postsurgical aortocoronary bypass status 2002    Presence of permanent cardiac pacemaker-DDD 11/6/2015    OFFICE CHECK : 8/14 nl function, +AHR rare(changed detection to over 150/mt); 11/15 nl function     Unspecified arthropathy, lower leg     S/P Left knee replacement. Had repeat surgery's for her left knee.     UTI (urinary tract infection)        Family History   Problem Relation Age of Onset    Arthritis-osteo Other         not specified    Hypertension Other         not specified    Sudden Death Neg Hx     Heart Attack Neg Hx        Social History     Tobacco Use    Smoking status: Current Some Day Smoker     Packs/day: 0.05    Smokeless tobacco: Never Used    Tobacco comment: no cigarett since 3/19   Substance Use Topics    Alcohol use: No    Drug use: Not on file        Current Outpatient Medications   Medication Sig    metoprolol tartrate (LOPRESSOR) 100 mg IR tablet Take 1 Tab by mouth two (2) times a day.  doxazosin (CARDURA) 8 mg tablet Take 1 Tab by mouth two (2) times a day. Janell Davies apixaban (Eliquis) 5 mg tablet take 1 tablet by mouth twice a day    furosemide (LASIX) 20 mg tablet take 1 tablet by mouth once daily if needed for edema    multivitamin (ONE A DAY) tablet Take 1 Tab by mouth daily.  cyanocobalamin (VITAMIN B-12) 1,000 mcg sublingual tablet Take 1,000 mcg by mouth. Every other friday    acetaminophen (TYLENOL) 500 mg tablet Take  by mouth every six (6) hours as needed for Pain.  ezetimibe (ZETIA) 10 mg tablet Take  by mouth daily.  rosuvastatin (CRESTOR) 40 mg tablet Take 1 Tab by mouth nightly.  amLODIPine (NORVASC) 10 mg tablet Take 10 mg by mouth daily. No current facility-administered medications for this visit. Past Surgical History:   Procedure Laterality Date    HX CORONARY ARTERY BYPASS GRAFT  10/2002     ORTHOPAEDIC  [id number]    Reduced two component revision using the Sigma System. Lateral release. Suture anchor repair of medial collateral ligament. Gardenia Leroy ORTHOPAEDIC  [id number]    Left knee arthrofibrosis, status post significant scarring, status post left total knee replacement    ME CARDIAC SURG PROCEDURE UNLIST  11/09    gen change-obici    VASCULAR SURGERY PROCEDURE UNLIST  2005    Aorto biffem.   Dr. Nahomi Banks       Diagnostic Studies:  CARDIOLOGY STUDIES 10/5/2017   Pharmacological Nuclear Stress Test Result mild small infbasal ischemia, 73%EF   Some recent data might be hidden     3/20 nuclear stress test  MARIANN REST/STRESS MULTIPLE 270 [facility]  Component Name Value Ref Range   EF Nuc 52     Result Impression    THIS MYOCARDIAL PERFUSION STUDY IS ABNORMAL   THIS IS A HIGH RISK STUDY   ABNORMAL NUCLEAR STUDY SUGGESTIVE OF TWO-VESSEL DISEASE     ON INITIAL STRESS IMAGES THERE IS A MODERATE TO LARGE AREA OF DIMINISHED UPTAKE IN THE ISOTOPE   THROUGHOUT THE ANTERIOR SEPTAL AND THE LATERAL WALL     ON THE REST IMAGES THERE IS ENHANCED UPTAKE THROUGHOUT THE ANTERIOR SEPTAL AND LATERAL WALL     THE GATED ACQUISITION SHOWS LATERAL WALL HYPOKINESIS AND AN EJECTION FRACTION ESTIMATED AT   50%     THE ABOVE FINDINGS ARE CONSISTENT WITH TWO-VESSEL ISCHEMIA INVOLVING THE PROXIMAL LAD AND   CIRCUMFLEX ARTERY       Visit Vitals  /82   Temp 97 °F (36.1 °C)   Ht 5' 10\" (1.778 m)   Wt 85.7 kg (189 lb)   BMI 27.12 kg/m²       Ms. Jaye Nissen has a reminder for a \"due or due soon\" health maintenance. I have asked that she contact her primary care provider for follow-up on this health maintenance. Physical Exam   Constitutional: She is oriented to person, place, and time. She appears well-developed and well-nourished. No distress. kyphosis   HENT:   Head: Normocephalic and atraumatic. Eyes: Right eye exhibits no discharge. Left eye exhibits no discharge. No scleral icterus. Neck: Neck supple. No JVD present. Carotid bruit is not present. No thyromegaly present. Cardiovascular: Normal rate, regular rhythm, S1 normal, S2 normal, normal heart sounds and intact distal pulses. Exam reveals no gallop and no friction rub. No murmur heard. Pulmonary/Chest: Effort normal and breath sounds normal. She has no wheezes. She has no rales. Abdominal: Soft. She exhibits no mass. There is no abdominal tenderness. Musculoskeletal: Normal range of motion. General: Edema (trace right) present. Comments: Cant put legs straight   Neurological: She is alert and oriented to person, place, and time. Skin: Skin is warm and dry. No rash noted. Psychiatric: She has a normal mood and affect. Her behavior is normal.   Nursing note and vitals reviewed. wounds healing well left subclavicular area     ASSESSMENT and PLAN      AFib / Flutter seen on interrogation of ICD  Started Eliquis 5 mg / BID  HLD: 5/17 LDL69; 2/16 LDL81;   Results for Awilda Worthington (MRN [id number]) as of 3/25/2021 13:15   Ref.  Range 3/19/2021 00:00 Triglyceride Latest Ref Range: 0 - 149 mg/dL 69   Cholesterol, total Latest Ref Range: 100 - 199 mg/dL 121   HDL Cholesterol Latest Ref Range: >39 mg/dL 46   VLDL, calculated Latest Ref Range: 5 - 40 mg/dL 14   LDL, calculated Latest Ref Range: 0 - 99 mg/dL 61       BiV ICD: gen change 11/09; 3/17  CARE LINK 10/10; 8/12; 4/13; 8/13; 2/16; 5/16; 8/16  nl function, nl volume; 2/17 nl function; DONNY; 8/17; 1/19; 11/19 nl function, nl volume; 2/20 normal function, normal volume, PAF; 6/20 normal function, recent increase in volume;  OFFICE CHECK : 8/14 nl function, +AHR rare(changed detection to over 150/mt); 11/15; 11/16; 3/17 nl function, nl vol; 4/18 nl function, frequent AHR(likely AFlutter), nl volume; Normal function, normal volume, AT/AF episodes are far field sensing events. 6/1/2020 blood pressure is elevated. Increase Cardura to 8 mg at bedtime. Patient cannot check her blood pressures at home even though she has a machine. Continue Eliquis due to frequent AT/A. fib on ICD interrogation. Follow-up [facility] every 3 months. Office check in 6 months  Stable CAD. Continue present medications. She has developed mild edema on the right leg but recently had a lot of stress and likely dietary indiscretion due to sister in the hospital and eventually dying in the hospital.  Patient trying to stop smoking and she was congratulated for that. Hopefully she will discontinue very soon. She has cut down the smoking significantly. Lasix helping well but she does not like to take it daily. Diagnoses and all orders for this visit:    1. Atherosclerosis of native coronary artery of native heart without angina pectoris    2. Paroxysmal atrial fibrillation (HCC)    3. Essential hypertension, benign    4. ICD (implantable cardioverter-defibrillator) in place-DDD ICD    5. Mixed hyperlipidemia    6. Postsurgical aortocoronary bypass status    7.  Tobacco abuse counseling        Pertinent laboratory and test data reviewed and discussed with patient. See patient instructions also for other medical advice given    There are no discontinued medications. Follow-up and Dispositions    · Return in about 3 months (around 6/25/2021), or if symptoms worsen or fail to improve, for Follow up with Dr. Kiersten Ly. 2/19/2021 CAD stable. Continue same medications  BP is elevated. Increase doxazosin to 8 mg twice daily. Follow home chart and visit with nurse practitioner 1 week approximately to check on the pressure. Check [facility] ASAP, preferably through today. Continue Eliquis for paroxysmal A. fib. Routine labs to check liver and lipids  Tobacco cessation discussed and reinforced again. She says she smokes 3 cigarettes a day and does not inhale. 3/25/2021  Blood pressure has improved with recent increase in doxazosin. Home chart reviewed blood pressure consistently 130s over 80s. CAD is stable with no symptoms presently, continue current medications.   Recent LFT and lipids reviewed and discussed with patient lipids at goal.  She reports stop smoking cigarettes on March 19 and for this was congratulated

## 2021-03-25 NOTE — PATIENT INSTRUCTIONS
Heart-Healthy Diet: Care Instructions Your Care Instructions A heart-healthy diet has lots of vegetables, fruits, nuts, beans, and whole grains, and is low in salt. It limits foods that are high in saturated fat, such as meats, cheeses, and fried foods. It may be hard to change your diet, but even small changes can lower your risk of heart attack and heart disease. Follow-up care is a key part of your treatment and safety. Be sure to make and go to all appointments, and call your doctor if you are having problems. It's also a good idea to know your test results and keep a list of the medicines you take. How can you care for yourself at home? Watch your portions · Learn what a serving is. A \"serving\" and a \"portion\" are not always the same thing. Make sure that you are not eating larger portions than are recommended. For example, a serving of pasta is ½ cup. A serving size of meat is 2 to 3 ounces. A 3-ounce serving is about the size of a deck of cards. Measure serving sizes until you are good at [place]" them. Keep in mind that restaurants often serve portions that are 2 or 3 times the size of one serving. · To keep your energy level up and keep you from feeling hungry, eat often but in smaller portions. · Eat only the number of calories you need to stay at a healthy weight. If you need to lose weight, eat fewer calories than your body burns (through exercise and other physical activity). Eat more fruits and vegetables · Eat a variety of fruit and vegetables every day. Dark green, deep orange, red, or yellow fruits and vegetables are especially good for you. Examples include spinach, carrots, peaches, and berries. · Keep carrots, celery, and other veggies handy for snacks. Buy fruit that is in season and store it where you can see it so that you will be tempted to eat it. · Cook dishes that have a lot of veggies in them, such as stir-fries and soups. Limit saturated and trans fat · Read food labels, and try to avoid saturated and trans fats. They increase your risk of heart disease. 
· Use olive or canola oil when you cook. 
· Bake, broil, grill, or steam foods instead of frying them. 
· Choose lean meats instead of high-fat meats such as hot dogs and sausages. Cut off all visible fat when you prepare meat. 
· Eat fish, skinless poultry, and meat alternatives such as soy products instead of high-fat meats. Soy products, such as tofu, may be especially good for your heart. 
· Choose low-fat or fat-free milk and dairy products. 
Eat foods high in fiber 
· Eat a variety of grain products every day. Include whole-grain foods that have lots of fiber and nutrients. Examples of whole-grain foods include oats, whole wheat bread, and brown rice. 
· Buy whole-grain breads and cereals, instead of white bread or pastries. 
Limit salt and sodium 
· Limit how much salt and sodium you eat to help lower your blood pressure. 
· Taste food before you salt it. Add only a little salt when you think you need it. With time, your taste buds will adjust to less salt. 
· Eat fewer snack items, fast foods, and other high-salt, processed foods. Check food labels for the amount of sodium in packaged foods. 
· Choose low-sodium versions of canned goods (such as soups, vegetables, and beans). 
Limit sugar 
· Limit drinks and foods with added sugar. These include candy, desserts, and soda pop. 
Limit alcohol 
· Limit alcohol to no more than 2 drinks a day for men and 1 drink a day for women. Too much alcohol can cause health problems. 
When should you call for help? 
Watch closely for changes in your health, and be sure to contact your doctor if: 
  · You would like help planning heart-healthy meals.  
Where can you learn more? 
Go to https://www.healthwise.net/GoodHelpConnections 
Enter V137 in the search box to learn more about \"Heart-Healthy Diet: Care Instructions.\" 
Current as of: August 22, 2019               Content  Version: 12.6 © 3507-4514 [employer]. Care instructions adapted under license by [employer] (which disclaims liability or warranty for this information). If you have questions about a medical condition or this instruction, always ask your healthcare professional. [address] any warranty or liability for your use of this information.

## 2021-06-08 DIAGNOSIS — I10 ESSENTIAL HYPERTENSION, BENIGN: ICD-10-CM

## 2021-06-08 RX ORDER — DOXAZOSIN 8 MG/1
8 TABLET ORAL 2 TIMES DAILY
Qty: 90 TABLET | Refills: 0 | Status: SHIPPED | OUTPATIENT
Start: 2021-06-08 | End: 2021-06-21 | Stop reason: SDUPTHER

## 2021-06-08 RX ORDER — DOXAZOSIN 8 MG/1
8 TABLET ORAL 2 TIMES DAILY
Qty: 180 TABLET | Refills: 1 | Status: SHIPPED | OUTPATIENT
Start: 2021-06-08 | End: 2021-07-26 | Stop reason: DRUGHIGH

## 2021-06-21 ENCOUNTER — OFFICE VISIT (OUTPATIENT)
Dept: CARDIOLOGY CLINIC | Age: 71
End: 2021-06-21
Payer: MEDICARE

## 2021-06-21 VITALS
SYSTOLIC BLOOD PRESSURE: 122 MMHG | WEIGHT: 176.8 LBS | DIASTOLIC BLOOD PRESSURE: 66 MMHG | BODY MASS INDEX: 25.31 KG/M2 | HEIGHT: 70 IN | HEART RATE: 55 BPM

## 2021-06-21 DIAGNOSIS — Z71.6 TOBACCO ABUSE COUNSELING: ICD-10-CM

## 2021-06-21 DIAGNOSIS — I50.33 ACUTE ON CHRONIC DIASTOLIC CONGESTIVE HEART FAILURE (HCC): Primary | ICD-10-CM

## 2021-06-21 DIAGNOSIS — E78.2 MIXED HYPERLIPIDEMIA: ICD-10-CM

## 2021-06-21 DIAGNOSIS — I10 ESSENTIAL HYPERTENSION, BENIGN: ICD-10-CM

## 2021-06-21 DIAGNOSIS — Z95.1 POSTSURGICAL AORTOCORONARY BYPASS STATUS: ICD-10-CM

## 2021-06-21 DIAGNOSIS — I25.708 CORONARY ARTERY DISEASE OF BYPASS GRAFT OF NATIVE HEART WITH STABLE ANGINA PECTORIS (HCC): ICD-10-CM

## 2021-06-21 DIAGNOSIS — Z95.810 ICD (IMPLANTABLE CARDIOVERTER-DEFIBRILLATOR) IN PLACE: ICD-10-CM

## 2021-06-21 DIAGNOSIS — I48.0 PAROXYSMAL ATRIAL FIBRILLATION (HCC): ICD-10-CM

## 2021-06-21 PROCEDURE — G8754 DIAS BP LESS 90: HCPCS | Performed by: INTERNAL MEDICINE

## 2021-06-21 PROCEDURE — G8400 PT W/DXA NO RESULTS DOC: HCPCS | Performed by: INTERNAL MEDICINE

## 2021-06-21 PROCEDURE — G8427 DOCREV CUR MEDS BY ELIG CLIN: HCPCS | Performed by: INTERNAL MEDICINE

## 2021-06-21 PROCEDURE — G8510 SCR DEP NEG, NO PLAN REQD: HCPCS | Performed by: INTERNAL MEDICINE

## 2021-06-21 PROCEDURE — G8752 SYS BP LESS 140: HCPCS | Performed by: INTERNAL MEDICINE

## 2021-06-21 PROCEDURE — 99215 OFFICE O/P EST HI 40 MIN: CPT | Performed by: INTERNAL MEDICINE

## 2021-06-21 PROCEDURE — 1101F PT FALLS ASSESS-DOCD LE1/YR: CPT | Performed by: INTERNAL MEDICINE

## 2021-06-21 PROCEDURE — G8419 CALC BMI OUT NRM PARAM NOF/U: HCPCS | Performed by: INTERNAL MEDICINE

## 2021-06-21 PROCEDURE — 3017F COLORECTAL CA SCREEN DOC REV: CPT | Performed by: INTERNAL MEDICINE

## 2021-06-21 PROCEDURE — 1090F PRES/ABSN URINE INCON ASSESS: CPT | Performed by: INTERNAL MEDICINE

## 2021-06-21 PROCEDURE — G8536 NO DOC ELDER MAL SCRN: HCPCS | Performed by: INTERNAL MEDICINE

## 2021-06-21 RX ORDER — ALBUTEROL SULFATE 90 UG/1
2 AEROSOL, METERED RESPIRATORY (INHALATION) AS NEEDED
COMMUNITY
Start: 2021-04-26

## 2021-06-21 RX ORDER — AMLODIPINE BESYLATE 5 MG/1
5 TABLET ORAL DAILY
Qty: 90 TABLET | Refills: 1
Start: 2021-06-21 | End: 2022-04-01

## 2021-06-21 NOTE — PATIENT INSTRUCTIONS
Medications Discontinued During This Encounter Medication Reason  doxazosin (CARDURA) 8 mg tablet DUPLICATE ORDER  
 amLODIPine (NORVASC) 10 mg tablet REORDER Learning About the [address] Diet What is the Mediterranean diet? The Mediterranean diet is a style of eating rather than a diet plan. It features foods eaten in [place], [place], [place] and [place], and other countries along the [facility]. It emphasizes eating foods like fish, fruits, vegetables, beans, high-fiber breads and whole grains, nuts, and olive oil. This style of eating includes limited red meat, cheese, and sweets. Why choose the Mediterranean diet? A Mediterranean-style diet may improve heart health. It contains more fat than other heart-healthy diets. But the fats are mainly from nuts, unsaturated oils (such as fish oils and olive oil), and certain nut or seed oils (such as canola, soybean, or flaxseed oil). These fats may help protect the heart and blood vessels. How can you get started on the Mediterranean diet? Here are some things you can do to switch to a more Mediterranean way of eating. What to eat · Eat a variety of fruits and vegetables each day, such as grapes, blueberries, tomatoes, broccoli, peppers, figs, olives, spinach, eggplant, beans, lentils, and chickpeas. · Eat a variety of whole-grain foods each day, such as oats, brown rice, and whole wheat bread, pasta, and couscous. · Eat fish at least 2 times a week. Try tuna, salmon, mackerel, lake trout, herring, or sardines. · Eat moderate amounts of low-fat dairy products, such as milk, cheese, or yogurt. · Eat moderate amounts of poultry and eggs. · Choose healthy (unsaturated) fats, such as nuts, olive oil, and certain nut or seed oils like canola, soybean, and flaxseed. · Limit unhealthy (saturated) fats, such as butter, palm oil, and coconut oil.  And limit fats found in animal products, such as meat and dairy products made with whole milk. Try to eat red meat only a few times a month in very small amounts. · Limit sweets and desserts to only a few times a week. This includes sugar-sweetened drinks like soda. The Mediterranean diet may also include red wine with your meal1 glass each day for women and up to 2 glasses a day for men. Tips for eating at home · Use herbs, spices, garlic, lemon zest, and citrus juice instead of salt to add flavor to foods. · Add avocado slices to your sandwich instead of juarez. · Have fish for lunch or dinner instead of red meat. Brush the fish with olive oil, and broil or grill it. · Sprinkle your salad with seeds or nuts instead of cheese. · Cook with olive or canola oil instead of butter or oils that are high in saturated fat. · Switch from 2% milk or whole milk to 1% or fat-free milk. · Dip raw vegetables in a vinaigrette dressing or hummus instead of dips made from mayonnaise or sour cream. 
· Have a piece of fruit for dessert instead of a piece of cake. Try baked apples, or have some dried fruit. Tips for eating out · Try broiled, grilled, baked, or poached fish instead of having it fried or breaded. · Ask your  to have your meals prepared with olive oil instead of butter. · Order dishes made with marinara sauce or sauces made from olive oil. Avoid sauces made from cream or mayonnaise. · Choose whole-grain breads, whole wheat pasta and pizza crust, brown rice, beans, and lentils. · Cut back on butter or margarine on bread. Instead, you can dip your bread in a small amount of olive oil. · Ask for a side salad or grilled vegetables instead of french fries or chips. Where can you learn more? Go to http://www.gray.com/ Enter 867-287-4574 in the search box to learn more about \"Learning About the Mediterranean Diet. \" Current as of: December 17, 2020               Content Version: 12.8 © 7556-6394 [employer]wise, [facility].   
Care instructions adapted under license by [employer] (which disclaims liability or warranty for this information). If you have questions about a medical condition or this instruction, always ask your healthcare professional. [address] any warranty or liability for your use of this information.

## 2021-06-21 NOTE — PROGRESS NOTES
HISTORY OF PRESENT ILLNESS  Sada Mccullough is a 65 y.o. female. Follow-up of CAD, status post CABG, hypertension, paroxysmal A. fib, status post ICD, hyperlipidemia. 6/20 seen for abnormal stress test which was done in [facility] in March 2024 preop testing for vascular surgery and was found to be abnormal.  She was recently seen 3 weeks ago when she did not mention anything about stress test being done in an outside hospital.    3/2025 patient presents to follow-up for hypertension and lab results      Hypertension  The history is provided by the medical records. This is a chronic problem. Associated symptoms include shortness of breath. Pertinent negatives include no chest pain and no headaches. Cholesterol Problem  The history is provided by the medical records. This is a chronic problem. Associated symptoms include shortness of breath. Pertinent negatives include no chest pain and no headaches. Palpitations   The history is provided by the medical records (PAF on ICD interrogation). Associated symptoms include lower extremity edema and shortness of breath. Pertinent negatives include no fever, no malaise/fatigue, no chest pain, no claudication, no orthopnea, no PND, no nausea, no vomiting, no headaches, no dizziness and no cough. Her past medical history is significant for hypertension. Leg Swelling  The history is provided by the patient. This is a new problem. The current episode started more than 1 week ago (2-3 wks). The problem has been gradually improving (Since the ER visit for CHF). Associated symptoms include shortness of breath. Pertinent negatives include no chest pain and no headaches. The symptoms are aggravated by standing. The symptoms are relieved by sleep and medications. Shortness of Breath  The history is provided by the patient. This is a new problem. The problem occurs intermittently. The current episode started more than 1 week ago.  The problem has been gradually improving (Post diuresis from ER and 4/21). Associated symptoms include leg swelling. Pertinent negatives include no fever, no headaches, no cough, no wheezing, no PND, no orthopnea, no chest pain, no vomiting, no rash and no claudication. Review of Systems   Constitutional: Negative for chills, fever, malaise/fatigue and weight loss. HENT: Negative for nosebleeds. Eyes: Negative for discharge. Respiratory: Positive for shortness of breath. Negative for cough and wheezing. Cardiovascular: Positive for palpitations and leg swelling. Negative for chest pain, orthopnea, claudication and PND. Gastrointestinal: Negative for diarrhea, nausea and vomiting. Genitourinary: Negative for dysuria and hematuria. Musculoskeletal: Negative for joint pain. Skin: Negative for rash. Neurological: Negative for dizziness, seizures, loss of consciousness and headaches. Endo/Heme/Allergies: Negative for polydipsia. Does not bruise/bleed easily. Psychiatric/Behavioral: Negative for depression and substance abuse. The patient does not have insomnia. Allergies   Allergen Reactions    Losartan Angioedema     Tongue swelling but not on lips       Past Medical History:   Diagnosis Date    Arthritis     Automatic implantable cardiac defibrillator in situ     Coronary atherosclerosis of native coronary artery     CABG 10/02 with LIMA to LAD and SVG to RCA    Depression     Essential hypertension     Headache(784.0)     Other and unspecified hyperlipidemia     9/12 Low density lipoproteins are at goal, triglycerides are at goal, high density lipoproteins are at goal.    Postsurgical aortocoronary bypass status 2002    Presence of permanent cardiac pacemaker-DDD 11/6/2015    OFFICE CHECK : 8/14 nl function, +AHR rare(changed detection to over 150/mt); 11/15 nl function     Unspecified arthropathy, lower leg     S/P Left knee replacement. Had repeat surgery's for her left knee.     UTI (urinary tract infection) Family History   Problem Relation Age of Onset    Arthritis-osteo Other         not specified    Hypertension Other         not specified    Sudden Death Neg Hx     Heart Attack Neg Hx        Social History     Tobacco Use    Smoking status: Current Every Day Smoker     Packs/day: 0.05     Years: 6.00     Pack years: 0.30     Start date: 2015    Smokeless tobacco: Never Used    Tobacco comment: pt states she do not smoke every day   Substance Use Topics    Alcohol use: No    Drug use: Not on file        Current Outpatient Medications   Medication Sig    albuterol (PROVENTIL HFA, VENTOLIN HFA, PROAIR HFA) 90 mcg/actuation inhaler Take 2 Puffs by inhalation.  doxazosin (CARDURA) 8 mg tablet Take 1 Tablet by mouth two (2) times a day. Crystal [place] metoprolol tartrate (LOPRESSOR) 100 mg IR tablet Take 1 Tab by mouth two (2) times a day.  apixaban (Eliquis) 5 mg tablet take 1 tablet by mouth twice a day    furosemide (LASIX) 20 mg tablet take 1 tablet by mouth once daily if needed for edema    multivitamin (ONE A DAY) tablet Take 1 Tab by mouth daily.  cyanocobalamin (VITAMIN B-12) 1,000 mcg sublingual tablet Take 1,000 mcg by mouth. Every other friday    acetaminophen (TYLENOL) 500 mg tablet Take  by mouth every six (6) hours as needed for Pain.  ezetimibe (ZETIA) 10 mg tablet Take  by mouth daily.  rosuvastatin (CRESTOR) 40 mg tablet Take 1 Tab by mouth nightly.  amLODIPine (NORVASC) 10 mg tablet Take 10 mg by mouth daily. No current facility-administered medications for this visit. Past Surgical History:   Procedure Laterality Date    HX CORONARY ARTERY BYPASS GRAFT  10/2002     ORTHOPAEDIC  736713    Reduced two component revision using the Sigma System. Lateral release. Suture anchor repair of medial collateral ligament.    Vidya Acevedo ORTHOPAEDIC  [id number]    Left knee arthrofibrosis, status post significant scarring, status post left total knee replacement    VT CARDIAC SURG PROCEDURE UNLIST  11/09    gen change-obici    VASCULAR SURGERY PROCEDURE UNLIST  2005    Aorto biffem. Dr. Alesia Rosenthal       Diagnostic Studies:  CARDIOLOGY STUDIES 10/5/2017   Pharmacological Nuclear Stress Test Result mild small infbasal ischemia, 73%EF   Some recent data might be hidden     3/20 nuclear stress test  MARIANN REST/STRESS MULTIPLE [facility]  Component Name Value Ref Range   EF Nuc 52     Result Impression    THIS MYOCARDIAL PERFUSION STUDY IS ABNORMAL   THIS IS A HIGH RISK STUDY   ABNORMAL NUCLEAR STUDY SUGGESTIVE OF TWO-VESSEL DISEASE     ON INITIAL STRESS IMAGES THERE IS A MODERATE TO LARGE AREA OF DIMINISHED UPTAKE IN THE ISOTOPE   THROUGHOUT THE ANTERIOR SEPTAL AND THE LATERAL WALL     ON THE REST IMAGES THERE IS ENHANCED UPTAKE THROUGHOUT THE ANTERIOR SEPTAL AND LATERAL WALL     THE GATED ACQUISITION SHOWS LATERAL WALL HYPOKINESIS AND AN EJECTION FRACTION ESTIMATED AT   50%     THE ABOVE FINDINGS ARE CONSISTENT WITH TWO-VESSEL ISCHEMIA INVOLVING THE PROXIMAL LAD AND   CIRCUMFLEX ARTERY       Visit Vitals  /66   Pulse (!) 55   Ht 5' 10\" (1.778 m)   Wt 80.2 kg (176 lb 12.8 oz)   BMI 25.37 kg/m²       Ms. Ishmael Michelle has a reminder for a \"due or due soon\" health maintenance. I have asked that she contact her primary care provider for follow-up on this health maintenance. Physical Exam  Vitals and nursing note reviewed. Constitutional:       General: She is not in acute distress. Appearance: She is well-developed. Comments: kyphosis   HENT:      Head: Normocephalic and atraumatic. Eyes:      General: No scleral icterus. Right eye: No discharge. Left eye: No discharge. Neck:      Thyroid: No thyromegaly. Vascular: No carotid bruit or JVD. Cardiovascular:      Rate and Rhythm: Normal rate and regular rhythm. Pulses: Intact distal pulses. Heart sounds: Normal heart sounds, S1 normal and S2 normal. No murmur heard. No friction rub. No gallop. Pulmonary:      Effort: Pulmonary effort is normal.      Breath sounds: Normal breath sounds. No wheezing or rales. Abdominal:      Palpations: Abdomen is soft. There is no mass. Tenderness: There is no abdominal tenderness. Musculoskeletal:         General: Deformity: 1-2. Normal range of motion. Cervical back: Neck supple. Right lower leg: Edema present. Left lower leg: Edema (trace to 1) present. Comments: Cant put legs straight   Skin:     General: Skin is warm and dry. Findings: No rash. Neurological:      Mental Status: She is alert and oriented to person, place, and time. Psychiatric:         Behavior: Behavior normal.     wounds healing well left subclavicular area     ASSESSMENT and PLAN      AFib / Flutter seen on interrogation of ICD  Started Eliquis 5 mg / BID  HLD: 5/17 LDL69; 2/16 LDL81;   Results for Lalitha Miramontes (MRN [id number]) as of 3/25/2021 13:15   Ref. Range 3/19/2021 00:00   Triglyceride Latest Ref Range: 0 - 149 mg/dL 69   Cholesterol, total Latest Ref Range: 100 - 199 mg/dL 121   HDL Cholesterol Latest Ref Range: >39 mg/dL 46   VLDL, calculated Latest Ref Range: 5 - 40 mg/dL 14   LDL, calculated Latest Ref Range: 0 - 99 mg/dL 61       BiV ICD: gen change 11/09; 3/17  CARE LINK 10/10; 8/12; 4/13; 8/13; 2/16; 5/16; 8/16  nl function, nl volume; 2/17 nl function; DONNY; 8/17; 1/19; 11/19 nl function, nl volume; 2/20 normal function, normal volume, PAF; 6/20 normal function, recent increase in volume; 3/21 normal function, high-volume, frequent PAF;  OFFICE CHECK : 8/14 nl function, +AHR rare(changed detection to over 150/mt); 11/15; 11/16; 3/17 nl function, nl vol; 4/18 nl function, frequent AHR(likely AFlutter), nl volume; Normal function, normal volume, AT/AF episodes are far field sensing events. 6/1/2020 blood pressure is elevated. Increase Cardura to 8 mg at bedtime.   Patient cannot check her blood pressures at home even though she has a machine. Continue Eliquis due to frequent AT/A. fib on ICD interrogation. Follow-up [facility] every 3 months. Office check in 6 months  Stable CAD. Continue present medications. She has developed mild edema on the right leg but recently had a lot of stress and likely dietary indiscretion due to sister in the hospital and eventually dying in the hospital.  Patient trying to stop smoking and she was congratulated for that. Hopefully she will discontinue very soon. She has cut down the smoking significantly. Lasix helping well but she does not like to take it daily. 2/19/2021 CAD stable. Continue same medications  BP is elevated. Increase doxazosin to 8 mg twice daily. Follow home chart and visit with nurse practitioner 1 week approximately to check on the pressure. Check [facility] ASAP, preferably through today. Continue Eliquis for paroxysmal A. fib. Routine labs to check liver and lipids  Tobacco cessation discussed and reinforced again. She says she smokes 3 cigarettes a day and does not inhale. 3/25/2021  Blood pressure has improved with recent increase in doxazosin. Home chart reviewed blood pressure consistently 130s over 80s. CAD is stable with no symptoms presently, continue current medications. Recent LFT and lipids reviewed and discussed with patient lipids at goal.  She reports stop smoking cigarettes on March 19 and for this was congratulated       Diagnoses and all orders for this visit:    1. Acute on chronic diastolic congestive heart failure (HCC)  -     amLODIPine (Norvasc) 5 mg tablet; Take 1 Tablet by mouth daily.  -     ECHO ADULT COMPLETE; Future  -     METABOLIC PANEL, BASIC; Future  -     LIPID PANEL; Future  -     HEPATIC FUNCTION PANEL; Future    2. Coronary artery disease of bypass graft of native heart with stable angina pectoris ([address]    3. ICD (implantable cardioverter-defibrillator) in place-DDD ICD    4. Essential hypertension, benign    5.  Mixed hyperlipidemia    6. Paroxysmal atrial fibrillation (HCC)    7. Postsurgical aortocoronary bypass status    8. Tobacco abuse counseling        Pertinent laboratory and test data reviewed and discussed with patient. See patient instructions also for other medical advice given    Medications Discontinued During This Encounter   Medication Reason    doxazosin (CARDURA) 8 mg tablet DUPLICATE ORDER    amLODIPine (NORVASC) 10 mg tablet REORDER       Follow-up and Dispositions    · Return in about 3 months (around 9/21/2021), or if symptoms worsen or fail to improve, for post test.       6/21/2021 has CHF exacerbation again like last year. Was in ER and improved with Lasix. Still has edema and needs to continue. Check echo. Reduce amlodipine so she can have better diuresis. CAD stable. She is cutting down her tobacco and was congratulated. Hopefully she can just let it go now. Labs as ordered.

## 2021-06-21 NOTE — PROGRESS NOTES
1. Have you been to the ER, urgent care clinic since your last visit? Hospitalized since your last visit? Yes When:4/21  Where:[facility] Reason for visit: wheezing    2. Have you seen or consulted any other health care providers outside of the [address] [place] [address] since your last visit? Include any pap smears or colon screening. Yes Where: pcp     3. Since your last visit, have you had any of the following symptoms? no       4. Have you had any blood work, X-rays or cardiac testing? Yes Where: [facility]       5. Where do you normally have your labs drawn? [facility]  6. Do you need any refills today?    no

## 2021-06-28 DIAGNOSIS — I50.33 ACUTE ON CHRONIC DIASTOLIC CONGESTIVE HEART FAILURE (HCC): ICD-10-CM

## 2021-07-08 LAB
ALBUMIN SERPL-MCNC: 3.6 G/DL (ref 3.7–4.7)
ALP SERPL-CCNC: 78 IU/L (ref 48–121)
ALT SERPL-CCNC: 15 IU/L (ref 0–32)
AST SERPL-CCNC: 26 IU/L (ref 0–40)
BILIRUB DIRECT SERPL-MCNC: 0.14 MG/DL (ref 0–0.4)
BILIRUB SERPL-MCNC: 0.3 MG/DL (ref 0–1.2)
BUN SERPL-MCNC: 18 MG/DL (ref 8–27)
BUN/CREAT SERPL: 15 (ref 12–28)
CALCIUM SERPL-MCNC: 8.9 MG/DL (ref 8.7–10.3)
CHLORIDE SERPL-SCNC: 108 MMOL/L (ref 96–106)
CHOLEST SERPL-MCNC: 125 MG/DL (ref 100–199)
CO2 SERPL-SCNC: 25 MMOL/L (ref 20–29)
CREAT SERPL-MCNC: 1.19 MG/DL (ref 0.57–1)
GLUCOSE SERPL-MCNC: 84 MG/DL (ref 65–99)
HDLC SERPL-MCNC: 49 MG/DL
LDLC SERPL CALC-MCNC: 63 MG/DL (ref 0–99)
POTASSIUM SERPL-SCNC: 4.2 MMOL/L (ref 3.5–5.2)
PROT SERPL-MCNC: 7.2 G/DL (ref 6–8.5)
SODIUM SERPL-SCNC: 144 MMOL/L (ref 134–144)
TRIGL SERPL-MCNC: 62 MG/DL (ref 0–149)
VLDLC SERPL CALC-MCNC: 13 MG/DL (ref 5–40)

## 2021-07-25 DIAGNOSIS — I10 ESSENTIAL HYPERTENSION, BENIGN: ICD-10-CM

## 2021-07-26 RX ORDER — DOXAZOSIN 2 MG/1
2 TABLET ORAL 2 TIMES DAILY
Qty: 180 TABLET | Refills: 3 | Status: SHIPPED | OUTPATIENT
Start: 2021-07-26 | End: 2021-08-02 | Stop reason: DRUGHIGH

## 2021-08-02 DIAGNOSIS — I10 ESSENTIAL HYPERTENSION, BENIGN: Primary | ICD-10-CM

## 2021-08-02 RX ORDER — DOXAZOSIN 8 MG/1
8 TABLET ORAL 2 TIMES DAILY
Qty: 20 TABLET | Refills: 0 | Status: SHIPPED | OUTPATIENT
Start: 2021-08-02 | End: 2021-08-09

## 2021-08-02 RX ORDER — DOXAZOSIN 8 MG/1
8 TABLET ORAL 2 TIMES DAILY
COMMUNITY
End: 2021-08-02 | Stop reason: SDUPTHER

## 2021-08-02 RX ORDER — DOXAZOSIN 8 MG/1
8 TABLET ORAL 2 TIMES DAILY
Qty: 90 TABLET | Refills: 3 | Status: SHIPPED | OUTPATIENT
Start: 2021-08-02 | End: 2022-03-07 | Stop reason: SDUPTHER

## 2021-08-02 NOTE — TELEPHONE ENCOUNTER
Requested Prescriptions     Pending Prescriptions Disp Refills    doxazosin (CARDURA) 8 mg tablet 90 Tablet 3     Sig: Take 1 Tablet by mouth two (2) times a day.

## 2021-08-09 RX ORDER — DOXAZOSIN 8 MG/1
TABLET ORAL
Qty: 20 TABLET | Refills: 0 | Status: SHIPPED | OUTPATIENT
Start: 2021-08-09 | End: 2021-08-20

## 2021-08-20 RX ORDER — DOXAZOSIN 8 MG/1
TABLET ORAL
Qty: 20 TABLET | Refills: 0 | Status: SHIPPED | OUTPATIENT
Start: 2021-08-20 | End: 2021-09-27 | Stop reason: SDUPTHER

## 2021-09-27 ENCOUNTER — OFFICE VISIT (OUTPATIENT)
Dept: CARDIOLOGY CLINIC | Age: 71
End: 2021-09-27
Payer: MEDICARE

## 2021-09-27 VITALS
SYSTOLIC BLOOD PRESSURE: 143 MMHG | BODY MASS INDEX: 24.45 KG/M2 | HEIGHT: 70 IN | HEART RATE: 49 BPM | WEIGHT: 170.8 LBS | DIASTOLIC BLOOD PRESSURE: 68 MMHG

## 2021-09-27 DIAGNOSIS — I10 ESSENTIAL HYPERTENSION, BENIGN: ICD-10-CM

## 2021-09-27 DIAGNOSIS — I25.10 ATHEROSCLEROSIS OF NATIVE CORONARY ARTERY OF NATIVE HEART WITHOUT ANGINA PECTORIS: ICD-10-CM

## 2021-09-27 DIAGNOSIS — E78.2 MIXED HYPERLIPIDEMIA: ICD-10-CM

## 2021-09-27 DIAGNOSIS — I50.32 CHRONIC DIASTOLIC CONGESTIVE HEART FAILURE (HCC): Primary | ICD-10-CM

## 2021-09-27 DIAGNOSIS — Z95.1 POSTSURGICAL AORTOCORONARY BYPASS STATUS: ICD-10-CM

## 2021-09-27 DIAGNOSIS — Z71.6 TOBACCO ABUSE COUNSELING: ICD-10-CM

## 2021-09-27 DIAGNOSIS — Z95.810 ICD (IMPLANTABLE CARDIOVERTER-DEFIBRILLATOR) IN PLACE: ICD-10-CM

## 2021-09-27 PROCEDURE — 3017F COLORECTAL CA SCREEN DOC REV: CPT | Performed by: INTERNAL MEDICINE

## 2021-09-27 PROCEDURE — G8420 CALC BMI NORM PARAMETERS: HCPCS | Performed by: INTERNAL MEDICINE

## 2021-09-27 PROCEDURE — G8510 SCR DEP NEG, NO PLAN REQD: HCPCS | Performed by: INTERNAL MEDICINE

## 2021-09-27 PROCEDURE — 99214 OFFICE O/P EST MOD 30 MIN: CPT | Performed by: INTERNAL MEDICINE

## 2021-09-27 PROCEDURE — G8536 NO DOC ELDER MAL SCRN: HCPCS | Performed by: INTERNAL MEDICINE

## 2021-09-27 PROCEDURE — 1090F PRES/ABSN URINE INCON ASSESS: CPT | Performed by: INTERNAL MEDICINE

## 2021-09-27 PROCEDURE — 1101F PT FALLS ASSESS-DOCD LE1/YR: CPT | Performed by: INTERNAL MEDICINE

## 2021-09-27 PROCEDURE — G8400 PT W/DXA NO RESULTS DOC: HCPCS | Performed by: INTERNAL MEDICINE

## 2021-09-27 PROCEDURE — G8427 DOCREV CUR MEDS BY ELIG CLIN: HCPCS | Performed by: INTERNAL MEDICINE

## 2021-09-27 PROCEDURE — G8754 DIAS BP LESS 90: HCPCS | Performed by: INTERNAL MEDICINE

## 2021-09-27 PROCEDURE — G8753 SYS BP > OR = 140: HCPCS | Performed by: INTERNAL MEDICINE

## 2021-09-27 NOTE — PATIENT INSTRUCTIONS
Medications Discontinued During This Encounter   Medication Reason    doxazosin (CARDURA) 8 mg tablet DUPLICATE ORDER          Learning About the Mediterranean Diet  What is the Mediterranean diet? The Mediterranean diet is a style of eating rather than a diet plan. It features foods eaten in [place], [place], [place] and [place], and other countries along the [facility]. It emphasizes eating foods like fish, fruits, vegetables, beans, high-fiber breads and whole grains, nuts, and olive oil. This style of eating includes limited red meat, cheese, and sweets. Why choose the Mediterranean diet? A Mediterranean-style diet may improve heart health. It contains more fat than other heart-healthy diets. But the fats are mainly from nuts, unsaturated oils (such as fish oils and olive oil), and certain nut or seed oils (such as canola, soybean, or flaxseed oil). These fats may help protect the heart and blood vessels. How can you get started on the Mediterranean diet? Here are some things you can do to switch to a more Mediterranean way of eating. What to eat  · Eat a variety of fruits and vegetables each day, such as grapes, blueberries, tomatoes, broccoli, peppers, figs, olives, spinach, eggplant, beans, lentils, and chickpeas. · Eat a variety of whole-grain foods each day, such as oats, brown rice, and whole wheat bread, pasta, and couscous. · Eat fish at least 2 times a week. Try tuna, salmon, mackerel, lake trout, herring, or sardines. · Eat moderate amounts of low-fat dairy products, such as milk, cheese, or yogurt. · Eat moderate amounts of poultry and eggs. · Choose healthy (unsaturated) fats, such as nuts, olive oil, and certain nut or seed oils like canola, soybean, and flaxseed. · Limit unhealthy (saturated) fats, such as butter, palm oil, and coconut oil. And limit fats found in animal products, such as meat and dairy products made with whole milk.  Try to eat red meat only a few times a month in very small amounts. · Limit sweets and desserts to only a few times a week. This includes sugar-sweetened drinks like soda. The Mediterranean diet may also include red wine with your meal1 glass each day for women and up to 2 glasses a day for men. Tips for eating at home  · Use herbs, spices, garlic, lemon zest, and citrus juice instead of salt to add flavor to foods. · Add avocado slices to your sandwich instead of juarez. · Have fish for lunch or dinner instead of red meat. Brush the fish with olive oil, and broil or grill it. · Sprinkle your salad with seeds or nuts instead of cheese. · Cook with olive or canola oil instead of butter or oils that are high in saturated fat. · Switch from 2% milk or whole milk to 1% or fat-free milk. · Dip raw vegetables in a vinaigrette dressing or hummus instead of dips made from mayonnaise or sour cream.  · Have a piece of fruit for dessert instead of a piece of cake. Try baked apples, or have some dried fruit. Tips for eating out  · Try broiled, grilled, baked, or poached fish instead of having it fried or breaded. · Ask your  to have your meals prepared with olive oil instead of butter. · Order dishes made with marinara sauce or sauces made from olive oil. Avoid sauces made from cream or mayonnaise. · Choose whole-grain breads, whole wheat pasta and pizza crust, brown rice, beans, and lentils. · Cut back on butter or margarine on bread. Instead, you can dip your bread in a small amount of olive oil. · Ask for a side salad or grilled vegetables instead of french fries or chips. Where can you learn more? Go to http://www.[employer].com/  Enter O407 in the search box to learn more about \"Learning About the Mediterranean Diet. \"  Current as of: December 17, 2020               Content Version: 13.0  © 4844-7937 [employer]wise, Incorporated.    Care instructions adapted under license by [employer] (which disclaims liability or warranty for this information). If you have questions about a medical condition or this instruction, always ask your healthcare professional. [place] [address] any warranty or liability for your use of this information.

## 2021-09-27 NOTE — PROGRESS NOTES
HISTORY OF PRESENT ILLNESS  Tray Redding is a 65 y.o. female. Follow-up of CAD, status post CABG, hypertension, paroxysmal A. fib, status post ICD, hyperlipidemia. 6/20 seen for abnormal stress test which was done in [facility] in March 2024 preop testing for vascular surgery and was found to be abnormal.  She was recently seen 3 weeks ago when she did not mention anything about stress test being done in an outside hospital.    3/2021 patient presents to follow-up for hypertension and lab results      Palpitations   The history is provided by the medical records (PAF on ICD interrogation). Associated symptoms include lower extremity edema and shortness of breath. Pertinent negatives include no fever, no malaise/fatigue, no chest pain, no claudication, no orthopnea, no PND, no nausea, no vomiting, no headaches, no dizziness and no cough. Her past medical history is significant for hypertension. Leg Swelling  The history is provided by the patient. This is a new problem. The current episode started more than 1 week ago (2-3 wks). The problem has been resolved (Since the ER visit for CHF). Associated symptoms include shortness of breath. Pertinent negatives include no chest pain and no headaches. The symptoms are aggravated by standing. The symptoms are relieved by sleep and medications. Shortness of Breath  The history is provided by the patient. This is a new problem. The problem occurs intermittently. The current episode started more than 1 week ago. The problem has been resolved (Post diuresis from ER and 4/21). Associated symptoms include leg swelling. Pertinent negatives include no fever, no headaches, no cough, no wheezing, no PND, no orthopnea, no chest pain, no vomiting, no rash and no claudication. Review of Systems   Constitutional: Negative for chills, fever, malaise/fatigue and weight loss. HENT: Negative for nosebleeds. Eyes: Negative for discharge.    Respiratory: Positive for shortness of breath. Negative for cough and wheezing. Cardiovascular: Positive for palpitations and leg swelling. Negative for chest pain, orthopnea, claudication and PND. Gastrointestinal: Negative for diarrhea, nausea and vomiting. Genitourinary: Negative for dysuria and hematuria. Musculoskeletal: Negative for joint pain. Skin: Negative for rash. Neurological: Negative for dizziness, seizures, loss of consciousness and headaches. Endo/Heme/Allergies: Negative for polydipsia. Does not bruise/bleed easily. Psychiatric/Behavioral: Negative for depression and substance abuse. The patient does not have insomnia. Allergies   Allergen Reactions    Losartan Angioedema     Tongue swelling but not on lips       Past Medical History:   Diagnosis Date    Arthritis     Automatic implantable cardiac defibrillator in situ     Coronary atherosclerosis of native coronary artery     CABG 10/02 with LIMA to LAD and SVG to RCA    Depression     Essential hypertension     Headache(784.0)     Other and unspecified hyperlipidemia     9/12 Low density lipoproteins are at goal, triglycerides are at goal, high density lipoproteins are at goal.    Postsurgical aortocoronary bypass status 2002    Presence of permanent cardiac pacemaker-DDD 11/6/2015    OFFICE CHECK : 8/14 nl function, +AHR rare(changed detection to over 150/mt); 11/15 nl function     Unspecified arthropathy, lower leg     S/P Left knee replacement. Had repeat surgery's for her left knee.     UTI (urinary tract infection)        Family History   Problem Relation Age of Onset    Arthritis-osteo Other         not specified    Hypertension Other         not specified    Sudden Death Neg Hx     Heart Attack Neg Hx        Social History     Tobacco Use    Smoking status: Current Every Day Smoker     Packs/day: 0.05     Years: 6.00     Pack years: 0.30     Start date: 2015    Smokeless tobacco: Never Used    Tobacco comment: pt states she do not smoke every day   Substance Use Topics    Alcohol use: No    Drug use: Not on file        Current Outpatient Medications   Medication Sig    albuterol (PROVENTIL HFA, VENTOLIN HFA, PROAIR HFA) 90 mcg/actuation inhaler Take 2 Puffs by inhalation.  amLODIPine (Norvasc) 5 mg tablet Take 1 Tablet by mouth daily.  metoprolol tartrate (LOPRESSOR) 100 mg IR tablet Take 1 Tab by mouth two (2) times a day.  apixaban (Eliquis) 5 mg tablet take 1 tablet by mouth twice a day    furosemide (LASIX) 20 mg tablet take 1 tablet by mouth once daily if needed for edema    multivitamin (ONE A DAY) tablet Take 1 Tab by mouth daily.  cyanocobalamin (VITAMIN B-12) 1,000 mcg sublingual tablet Take 1,000 mcg by mouth. Every other friday    acetaminophen (TYLENOL) 500 mg tablet Take  by mouth every six (6) hours as needed for Pain.  rosuvastatin (CRESTOR) 40 mg tablet Take 1 Tab by mouth nightly.  doxazosin (CARDURA) 8 mg tablet Take 1 Tablet by mouth two (2) times a day. (Patient not taking: Reported on 9/27/2021)    ezetimibe (ZETIA) 10 mg tablet Take  by mouth daily. (Patient not taking: Reported on 9/27/2021)     No current facility-administered medications for this visit. Past Surgical History:   Procedure Laterality Date    HX CORONARY ARTERY BYPASS GRAFT  10/2002     ORTHOPAEDIC  111168    Reduced two component revision using the Sigma System. Lateral release. Suture anchor repair of medial collateral ligament. [facility] ORTHOPAEDIC  [id number]    Left knee arthrofibrosis, status post significant scarring, status post left total knee replacement    IN CARDIAC SURG PROCEDURE UNLIST  11/09    gen change-[facility]    VASCULAR SURGERY PROCEDURE UNLIST  2005    Aorto biffem.   Dr. Janice Henry       Diagnostic Studies:  CARDIOLOGY STUDIES 10/5/2017   Pharmacological Nuclear Stress Test Result mild small infbasal ischemia, 73%EF   Some recent data might be hidden     3/20 nuclear stress test  MARIANN REST/STRESS MULTIPLE [facility]  Component Name Value Ref Range   EF Nuc 52     Result Impression    THIS MYOCARDIAL PERFUSION STUDY IS ABNORMAL   THIS IS A HIGH RISK STUDY   ABNORMAL NUCLEAR STUDY SUGGESTIVE OF TWO-VESSEL DISEASE     ON INITIAL STRESS IMAGES THERE IS A MODERATE TO LARGE AREA OF DIMINISHED UPTAKE IN THE ISOTOPE   THROUGHOUT THE ANTERIOR SEPTAL AND THE LATERAL WALL     ON THE REST IMAGES THERE IS ENHANCED UPTAKE THROUGHOUT THE ANTERIOR SEPTAL AND LATERAL WALL     THE GATED ACQUISITION SHOWS LATERAL WALL HYPOKINESIS AND AN EJECTION FRACTION ESTIMATED AT   50%     THE ABOVE FINDINGS ARE CONSISTENT WITH TWO-VESSEL ISCHEMIA INVOLVING THE PROXIMAL LAD AND   [facility] ARTERY   8/21 echo  Interpretation Summary    · LV: Estimated LVEF is 40 - 45%. Normal cavity size. Moderate concentric hypertrophy. Mild hypokinesis of the mid inferior and mid inferoseptal wall(s). Severe hypokinesis of the basal inferior and basal inferoseptal wall(s). Severe (grade 3) left ventricular diastolic dysfunction. · LA: Moderately dilated left atrium. Left Atrium volume index is 48.02 mL/m2. · RV: Pacer/ICD present. · RA: Mildly dilated right atrium. · MV: Mitral valve thickening. Mitral annular calcification. · TV: Mild tricuspid valve regurgitation is present. Right Ventricular Arterial Pressure (RVSP) is 35 mmHg. Pulmonary hypertension not suggested by Doppler findings. · PV: Mild pulmonic valve regurgitation is present. Comparison Study Information    Prior Study    When compared to the previous study from 1/27/09, the overall left ventricular ejection fraction now appears mildly reduced, there is now moderate left ventricular hypertrophy, the wall motion abnormalities appear to be new, and there is now bi atrial enlargement.          Visit Vitals  BP (!) 143/68   Pulse (!) 49   Ht 5' 10\" (1.778 m)   Wt 77.5 kg (170 lb 12.8 oz)   BMI 24.51 kg/m²       Ms. Mark Dean has a reminder for a \"due or due soon\" health maintenance. I have asked that she contact her primary care provider for follow-up on this health maintenance. Physical Exam  Vitals and nursing note reviewed. Constitutional:       General: She is not in acute distress. Appearance: She is well-developed. Comments: kyphosis   HENT:      Head: Normocephalic and atraumatic. Eyes:      General: No scleral icterus. Right eye: No discharge. Left eye: No discharge. Neck:      Thyroid: No thyromegaly. Vascular: No carotid bruit or JVD. Cardiovascular:      Rate and Rhythm: Normal rate and regular rhythm. Pulses: Intact distal pulses. Heart sounds: Normal heart sounds, S1 normal and S2 normal. No murmur heard. No friction rub. No gallop. Pulmonary:      Effort: Pulmonary effort is normal.      Breath sounds: Normal breath sounds. No wheezing or rales. Abdominal:      Palpations: Abdomen is soft. There is no mass. Tenderness: There is no abdominal tenderness. Musculoskeletal:         General: Deformity: 1-2. Normal range of motion. Cervical back: Neck supple. Right lower leg: No edema. Left lower leg: No edema. Comments: Cant put legs straight   Skin:     General: Skin is warm and dry. Findings: No rash. Neurological:      Mental Status: She is alert and oriented to person, place, and time. Psychiatric:         Behavior: Behavior normal.     wounds healing well left subclavicular area     ASSESSMENT and PLAN      AFib / Flutter seen on interrogation of ICD  Started Eliquis 5 mg / BID  HLD: 5/17 LDL69; 2/16 LDL81;   Results for Jakub France (MRN [id number]) as of 9/27/2021 10:45   Ref.  Range 7/7/2021 00:00   Triglyceride Latest Ref Range: 0 - 149 mg/dL 62   Cholesterol, total Latest Ref Range: 100 - 199 mg/dL 125   HDL Cholesterol Latest Ref Range: >39 mg/dL 49   VLDL, calculated Latest Ref Range: 5 - 40 mg/dL 13   LDL, calculated Latest Ref Range: 0 - 99 mg/dL 63         BiV ICD: gen change 11/09; 3/17  CARE LINK 10/10; 8/12; 4/13; 8/13; 2/16; 5/16; 8/16  nl function, nl volume; 2/17 nl function; DONNY; 8/17; 1/19; 11/19 nl function, nl volume; 2/20 normal function, normal volume, PAF; 6/20 normal function, recent increase in volume; 3/21 normal function, high-volume, frequent PAF; 6/21 normal function, nearly persistent A. fib, intermittent high-volume;  OFFICE CHECK : 8/14 nl function, +AHR rare(changed detection to over 150/mt); 11/15; 11/16; 3/17 nl function, nl vol; 4/18 nl function, frequent AHR(likely AFlutter), nl volume; Normal function, normal volume, AT/AF episodes are far field sensing events. 6/1/2020 blood pressure is elevated. Increase Cardura to 8 mg at bedtime. Patient cannot check her blood pressures at home even though she has a machine. Continue Eliquis due to frequent AT/A. fib on ICD interrogation. Follow-up [facility] every 3 months. Office check in 6 months  Stable CAD. Continue present medications. She has developed mild edema on the right leg but recently had a lot of stress and likely dietary indiscretion due to sister in the hospital and eventually dying in the hospital.  Patient trying to stop smoking and she was congratulated for that. Hopefully she will discontinue very soon. She has cut down the smoking significantly. Lasix helping well but she does not like to take it daily. 2/19/2021 CAD stable. Continue same medications  BP is elevated. Increase doxazosin to 8 mg twice daily. Follow home chart and visit with nurse practitioner 1 week approximately to check on the pressure. Check [facility] ASAP, preferably through today. Continue Eliquis for paroxysmal A. fib. Routine labs to check liver and lipids  Tobacco cessation discussed and reinforced again. She says she smokes 3 cigarettes a day and does not inhale. 3/25/2021  Blood pressure has improved with recent increase in doxazosin.   Home chart reviewed blood pressure consistently 130s over 80s. CAD is stable with no symptoms presently, continue current medications. Recent LFT and lipids reviewed and discussed with patient lipids at goal.  She reports stop smoking cigarettes on March 19 and for this was congratulated     6/21/2021 has CHF exacerbation again like last year. Was in ER and improved with Lasix. Still has edema and needs to continue. Check echo. Reduce amlodipine so she can have better diuresis. CAD stable. She is cutting down her tobacco and was congratulated. Hopefully she can just let it go now. Labs as ordered. Diagnoses and all orders for this visit:    1. Chronic diastolic congestive heart failure ([facility] [address]    2. Postsurgical aortocoronary bypass status    3. Tobacco abuse counseling    4. Atherosclerosis of native coronary artery of native heart without angina pectoris    5. Essential hypertension, benign    6. ICD (implantable cardioverter-defibrillator) in place-DDD ICD    7. Mixed hyperlipidemia        Pertinent laboratory and test data reviewed and discussed with patient. See patient instructions also for other medical advice given    Medications Discontinued During This Encounter   Medication Reason    doxazosin (CARDURA) 8 mg tablet DUPLICATE ORDER       Follow-up and Dispositions    · Return in about 6 months (around 3/27/2022), or if symptoms worsen or fail to improve, for [facility] asa. 9/27/2021 CHF is compensated now NYHA class II. CAD stable without any significant angina. She has almost quit smoking and smokes 1 cigarette a week as she says. She was congratulated for that. Blood pressure is minimally elevated but she is not sure whether she takes doxazosin at home she will call back and then we can adjust the medications. ICD is functioning normally but needs to be interrogated again. Lipids are well controlled. Echo has shown mild reduction in LV function. Dilated biatrial sizes. Medical treatment to continue.

## 2021-09-27 NOTE — PROGRESS NOTES
1. Have you been to the ER, urgent care clinic since your last visit? Hospitalized since your last visit? No    2. Have you seen or consulted any other health care providers outside of the [address] [place] [address] since your last visit? Include any pap smears or colon screening. No     3. Since your last visit, have you had any of the following symptoms? NO        4. Have you had any blood work, X-rays or cardiac testing? Yes Where: [facility]           5. Where do you normally have your labs drawn? [facility]    6. Do you need any refills today?    No

## 2021-11-09 DIAGNOSIS — I10 ESSENTIAL HYPERTENSION, BENIGN: ICD-10-CM

## 2021-11-09 DIAGNOSIS — I25.10 ATHEROSCLEROSIS OF NATIVE CORONARY ARTERY OF NATIVE HEART WITHOUT ANGINA PECTORIS: ICD-10-CM

## 2021-11-09 RX ORDER — METOPROLOL TARTRATE 100 MG/1
TABLET ORAL
Qty: 180 TABLET | Refills: 3 | Status: ON HOLD | OUTPATIENT
Start: 2021-11-09 | End: 2022-04-05 | Stop reason: SDUPTHER

## 2021-12-09 ENCOUNTER — TELEPHONE (OUTPATIENT)
Dept: CARDIOLOGY CLINIC | Age: 71
End: 2021-12-09

## 2021-12-09 NOTE — TELEPHONE ENCOUNTER
[facility] is trying to schedule colonoscopy and patient states per you patient can't have any sedation d/t her last cardiac procedure.  Please advise

## 2021-12-09 NOTE — TELEPHONE ENCOUNTER
Why should I say that she cannot be sedated.   Ask patient if she has any specific reason and recollection

## 2021-12-09 NOTE — TELEPHONE ENCOUNTER
Patient states when she went in to have battery change they ended up doing stents and when you came out to talk to her that she needed another procedure but d/t to procedure that if she was put to sleep she probably wouldn't wake up she don't remember all that really happen just that if she was put sleep she may not wake up.

## 2021-12-10 NOTE — TELEPHONE ENCOUNTER
Okay to use moderate sedation from my standpoint. I do not have any documentation of any significant side effects from previous procedures.

## 2021-12-10 NOTE — TELEPHONE ENCOUNTER
Please get her discharge summary as [facility] does not have it so far.   We need to see what was done in the hospital

## 2021-12-13 NOTE — TELEPHONE ENCOUNTER
Spoke with Doug this morning per Dr. BRIGITTE PALOMO [facility] okay to use moderate sedation from my standpoint. I don't have any documentation of any significant side effects from previous procedures. She voices understanding and acceptance of this advice and will call back if any further questions or concerns.

## 2022-03-07 DIAGNOSIS — I10 ESSENTIAL HYPERTENSION, BENIGN: ICD-10-CM

## 2022-03-07 RX ORDER — DOXAZOSIN 8 MG/1
8 TABLET ORAL 2 TIMES DAILY
Qty: 90 TABLET | Refills: 3 | Status: ON HOLD | OUTPATIENT
Start: 2022-03-07 | End: 2022-04-05 | Stop reason: SDUPTHER

## 2022-03-18 PROBLEM — I25.708 CORONARY ARTERY DISEASE OF BYPASS GRAFT OF NATIVE HEART WITH STABLE ANGINA PECTORIS (HCC): Status: ACTIVE | Noted: 2021-03-19

## 2022-03-18 PROBLEM — Z45.02 AICD AT END OF BATTERY LIFE: Status: ACTIVE | Noted: 2017-03-15

## 2022-03-18 PROBLEM — I50.33 ACUTE ON CHRONIC DIASTOLIC CONGESTIVE HEART FAILURE (HCC): Status: ACTIVE | Noted: 2021-06-21

## 2022-03-19 PROBLEM — Z01.810 PREOP CARDIOVASCULAR EXAM: Status: ACTIVE | Noted: 2017-10-02

## 2022-03-19 PROBLEM — Z71.6 TOBACCO ABUSE COUNSELING: Status: ACTIVE | Noted: 2019-02-08

## 2022-03-19 PROBLEM — I48.0 PAROXYSMAL ATRIAL FIBRILLATION (HCC): Status: ACTIVE | Noted: 2018-04-06

## 2022-04-01 ENCOUNTER — OFFICE VISIT (OUTPATIENT)
Dept: CARDIOLOGY CLINIC | Age: 72
End: 2022-04-01

## 2022-04-01 ENCOUNTER — OFFICE VISIT (OUTPATIENT)
Dept: CARDIOLOGY CLINIC | Age: 72
End: 2022-04-01
Payer: MEDICARE

## 2022-04-01 VITALS
HEIGHT: 70 IN | SYSTOLIC BLOOD PRESSURE: 155 MMHG | BODY MASS INDEX: 24.91 KG/M2 | HEART RATE: 59 BPM | DIASTOLIC BLOOD PRESSURE: 53 MMHG | OXYGEN SATURATION: 98 % | WEIGHT: 174 LBS

## 2022-04-01 DIAGNOSIS — I10 ESSENTIAL HYPERTENSION, BENIGN: ICD-10-CM

## 2022-04-01 DIAGNOSIS — Z95.1 POSTSURGICAL AORTOCORONARY BYPASS STATUS: ICD-10-CM

## 2022-04-01 DIAGNOSIS — Z71.6 TOBACCO ABUSE COUNSELING: ICD-10-CM

## 2022-04-01 DIAGNOSIS — I50.32 CHRONIC DIASTOLIC CONGESTIVE HEART FAILURE (HCC): ICD-10-CM

## 2022-04-01 DIAGNOSIS — I48.0 PAROXYSMAL ATRIAL FIBRILLATION (HCC): ICD-10-CM

## 2022-04-01 DIAGNOSIS — I25.10 ATHEROSCLEROSIS OF NATIVE CORONARY ARTERY OF NATIVE HEART WITHOUT ANGINA PECTORIS: Primary | ICD-10-CM

## 2022-04-01 DIAGNOSIS — Z95.810 ICD (IMPLANTABLE CARDIOVERTER-DEFIBRILLATOR) IN PLACE: ICD-10-CM

## 2022-04-01 PROCEDURE — 99214 OFFICE O/P EST MOD 30 MIN: CPT | Performed by: INTERNAL MEDICINE

## 2022-04-01 PROCEDURE — 93000 ELECTROCARDIOGRAM COMPLETE: CPT | Performed by: INTERNAL MEDICINE

## 2022-04-01 PROCEDURE — 93284 PRGRMG EVAL IMPLANTABLE DFB: CPT | Performed by: INTERNAL MEDICINE

## 2022-04-01 PROCEDURE — 93290 INTERROG DEV EVAL ICPMS IP: CPT | Performed by: INTERNAL MEDICINE

## 2022-04-01 RX ORDER — SODIUM CHLORIDE 0.9 % (FLUSH) 0.9 %
5-40 SYRINGE (ML) INJECTION AS NEEDED
Status: CANCELLED | OUTPATIENT
Start: 2022-04-01

## 2022-04-01 RX ORDER — SODIUM CHLORIDE 0.9 % (FLUSH) 0.9 %
5-40 SYRINGE (ML) INJECTION EVERY 8 HOURS
Status: CANCELLED | OUTPATIENT
Start: 2022-04-01

## 2022-04-01 RX ORDER — AMLODIPINE BESYLATE 10 MG/1
10 TABLET ORAL DAILY
Qty: 90 TABLET | Refills: 3 | Status: SHIPPED | OUTPATIENT
Start: 2022-04-01

## 2022-04-01 NOTE — PROGRESS NOTES
HISTORY OF PRESENT ILLNESS  Jt Gaitan is a 66 y.o. female. Follow-up of CAD, status post CABG, hypertension, paroxysmal A. fib, status post ICD, hyperlipidemia. 6/20 seen for abnormal stress test which was done in [facility] in March 2024 preop testing for vascular surgery and was found to be abnormal.  She was recently seen 3 weeks ago when she did not mention anything about stress test being done in an outside hospital.    3/2021 patient presents to follow-up for hypertension and lab results      Palpitations   The history is provided by the medical records (PAF on ICD interrogation). Associated symptoms include lower extremity edema and shortness of breath. Pertinent negatives include no fever, no malaise/fatigue, no chest pain, no claudication, no orthopnea, no PND, no nausea, no vomiting, no headaches, no dizziness and no cough. Her past medical history is significant for hypertension. Follow-up  Associated symptoms include shortness of breath. Pertinent negatives include no chest pain and no headaches. Review of Systems   Constitutional: Negative for chills, fever, malaise/fatigue and weight loss. HENT: Negative for nosebleeds. Eyes: Negative for discharge. Respiratory: Positive for shortness of breath. Negative for cough and wheezing. Cardiovascular: Positive for palpitations and leg swelling. Negative for chest pain, orthopnea, claudication and PND. Gastrointestinal: Negative for diarrhea, nausea and vomiting. Genitourinary: Negative for dysuria and hematuria. Musculoskeletal: Negative for joint pain. Skin: Negative for rash. Neurological: Negative for dizziness, seizures, loss of consciousness and headaches. Endo/Heme/Allergies: Negative for polydipsia. Does not bruise/bleed easily. Psychiatric/Behavioral: Negative for depression and substance abuse. The patient does not have insomnia.       Allergies   Allergen Reactions    Losartan Angioedema     Tongue swelling but not on lips       Past Medical History:   Diagnosis Date    Arthritis     Automatic implantable cardiac defibrillator in situ     Coronary atherosclerosis of native coronary artery     CABG 10/02 with LIMA to LAD and SVG to RCA    Depression     Essential hypertension     Headache(784.0)     Other and unspecified hyperlipidemia     9/12 Low density lipoproteins are at goal, triglycerides are at goal, high density lipoproteins are at goal.    Postsurgical aortocoronary bypass status 2002    Presence of permanent cardiac pacemaker-DDD 11/6/2015    OFFICE CHECK : 8/14 nl function, +AHR rare(changed detection to over 150/mt); 11/15 nl function     Unspecified arthropathy, lower leg     S/P Left knee replacement. Had repeat surgery's for her left knee.  UTI (urinary tract infection)        Family History   Problem Relation Age of Onset    OSTEOARTHRITIS Other         not specified    Hypertension Other         not specified    Sudden Death Neg Hx     Heart Attack Neg Hx        Social History     Tobacco Use    Smoking status: Current Every Day Smoker     Packs/day: 0.05     Years: 6.00     Pack years: 0.30     Start date: 2015    Smokeless tobacco: Never Used    Tobacco comment: pt states she do not smoke every day   Substance Use Topics    Alcohol use: No    Drug use: Not on file        Current Outpatient Medications   Medication Sig    doxazosin (CARDURA) 8 mg tablet Take 1 Tablet by mouth two (2) times a day.  metoprolol tartrate (LOPRESSOR) 100 mg IR tablet take 1 tablet by mouth twice a day    albuterol (PROVENTIL HFA, VENTOLIN HFA, PROAIR HFA) 90 mcg/actuation inhaler Take 2 Puffs by inhalation.  amLODIPine (Norvasc) 5 mg tablet Take 1 Tablet by mouth daily.     apixaban (Eliquis) 5 mg tablet take 1 tablet by mouth twice a day    furosemide (LASIX) 20 mg tablet take 1 tablet by mouth once daily if needed for edema    multivitamin (ONE A DAY) tablet Take 1 Tab by mouth daily.    cyanocobalamin (VITAMIN B-12) 1,000 mcg sublingual tablet Take 1,000 mcg by mouth. Every other friday    acetaminophen (TYLENOL) 500 mg tablet Take  by mouth every six (6) hours as needed for Pain.  ezetimibe (ZETIA) 10 mg tablet Take  by mouth daily. (Patient not taking: Reported on 9/27/2021)    rosuvastatin (CRESTOR) 40 mg tablet Take 1 Tab by mouth nightly. No current facility-administered medications for this visit. Past Surgical History:   Procedure Laterality Date    HX CORONARY ARTERY BYPASS GRAFT  10/2002    HX ORTHOPAEDIC  700644    Reduced two component revision using the Sigma System. Lateral release. Suture anchor repair of medial collateral ligament. [facility] ORTHOPAEDIC  [id number]    Left knee arthrofibrosis, status post significant scarring, status post left total knee replacement    SD CARDIAC SURG PROCEDURE UNLIST  11/09    gen change-[facility]    VASCULAR SURGERY PROCEDURE UNLIST  2005    Aorto biffem.   Dr. Agrawal Given       Diagnostic Studies:  CARDIOLOGY STUDIES 10/5/2017   Pharmacological Nuclear Stress Test Result mild small infbasal ischemia, 73%EF   Some recent data might be hidden     3/20 nuclear stress test  MARIANN REST/STRESS MULTIPLE [facility]  Component Name Value Ref Range   EF Nuc 52     Result Impression    THIS MYOCARDIAL PERFUSION STUDY IS ABNORMAL   THIS IS A HIGH RISK STUDY   ABNORMAL NUCLEAR STUDY SUGGESTIVE OF TWO-VESSEL DISEASE     ON INITIAL STRESS IMAGES THERE IS A MODERATE TO LARGE AREA OF DIMINISHED UPTAKE IN THE ISOTOPE   THROUGHOUT THE ANTERIOR SEPTAL AND THE LATERAL WALL     ON THE REST IMAGES THERE IS ENHANCED UPTAKE THROUGHOUT THE ANTERIOR SEPTAL AND LATERAL WALL     THE GATED ACQUISITION SHOWS LATERAL WALL HYPOKINESIS AND AN EJECTION FRACTION ESTIMATED AT   50%     THE ABOVE FINDINGS ARE CONSISTENT WITH TWO-VESSEL ISCHEMIA INVOLVING THE PROXIMAL LAD AND   [address] ARTERY   8/21 echo  Interpretation Summary    · LV: Estimated LVEF is 40 - 45%. Normal cavity size. Moderate concentric hypertrophy. Mild hypokinesis of the mid inferior and mid inferoseptal wall(s). Severe hypokinesis of the basal inferior and basal inferoseptal wall(s). Severe (grade 3) left ventricular diastolic dysfunction. · LA: Moderately dilated left atrium. Left Atrium volume index is 48.02 mL/m2. · RV: Pacer/ICD present. · RA: Mildly dilated right atrium. · MV: Mitral valve thickening. Mitral annular calcification. · TV: Mild tricuspid valve regurgitation is present. Right Ventricular Arterial Pressure (RVSP) is 35 mmHg. Pulmonary hypertension not suggested by Doppler findings. · PV: Mild pulmonic valve regurgitation is present. Comparison Study Information    Prior Study    When compared to the previous study from 1/27/09, the overall left ventricular ejection fraction now appears mildly reduced, there is now moderate left ventricular hypertrophy, the wall motion abnormalities appear to be new, and there is now bi atrial enlargement. Visit Vitals  Ht 5' 10\" (1.778 m)   BMI 24.51 kg/m²       Ms. Shane Wallace has a reminder for a \"due or due soon\" health maintenance. I have asked that she contact her primary care provider for follow-up on this health maintenance. Physical Exam  Vitals and nursing note reviewed. Constitutional:       General: She is not in acute distress. Appearance: She is well-developed. Comments: kyphosis   HENT:      Head: Normocephalic and atraumatic. Eyes:      General: No scleral icterus. Right eye: No discharge. Left eye: No discharge. Neck:      Thyroid: No thyromegaly. Vascular: No carotid bruit or JVD. Cardiovascular:      Rate and Rhythm: Normal rate and regular rhythm. Pulses: Intact distal pulses. Heart sounds: Normal heart sounds, S1 normal and S2 normal. No murmur heard. No friction rub. No gallop.     Pulmonary:      Effort: Pulmonary effort is normal.      Breath sounds: Normal breath sounds. No wheezing or rales. Abdominal:      Palpations: Abdomen is soft. There is no mass. Tenderness: There is no abdominal tenderness. Musculoskeletal:         General: Deformity: 1-2. Normal range of motion. Cervical back: Neck supple. Right lower leg: No edema. Left lower leg: No edema. Comments: Cant put legs straight   Skin:     General: Skin is warm and dry. Findings: No rash. Neurological:      Mental Status: She is alert and oriented to person, place, and time. Psychiatric:         Behavior: Behavior normal.     wounds healing well left subclavicular area     ASSESSMENT and PLAN      AFib / Flutter seen on interrogation of ICD  Started Eliquis 5 mg / BID  HLD: 5/17 LDL69; 2/16 LDL81;   Results for Navneet Alarcon (MRN [id number]) as of 9/27/2021 10:45   Ref. Range 7/7/2021 00:00   Triglyceride Latest Ref Range: 0 - 149 mg/dL 62   Cholesterol, total Latest Ref Range: 100 - 199 mg/dL 125   HDL Cholesterol Latest Ref Range: >39 mg/dL 49   VLDL, calculated Latest Ref Range: 5 - 40 mg/dL 13   LDL, calculated Latest Ref Range: 0 - 99 mg/dL 63         BiV ICD: gen change 11/09; 3/17  CARE LINK 10/10; 8/12; 4/13; 8/13; 2/16; 5/16; 8/16  nl function, nl volume; 2/17 nl function; DONNY; 8/17; 1/19; 11/19 nl function, nl volume; 2/20 normal function, normal volume, PAF; 6/20 normal function, recent increase in volume; 3/21 normal function, high-volume, frequent PAF; 6/21 normal function, nearly persistent A. fib, intermittent high-volume;  OFFICE CHECK : 8/14 nl function, +AHR rare(changed detection to over 150/mt); 11/15; 11/16; 3/17 nl function, nl vol; 4/18 nl function, frequent AHR(likely AFlutter), nl volume; Normal function, normal volume, AT/AF episodes are far field sensing events;  4/22 normal function, normal volume, permanent AF for many months now. Delete that;      6/1/2020 blood pressure is elevated. Increase Cardura to 8 mg at bedtime.   Patient cannot check her blood pressures at home even though she has a machine. Continue Eliquis due to frequent AT/A. fib on ICD interrogation. Follow-up [facility] every 3 months. Office check in 6 months  Stable CAD. Continue present medications. She has developed mild edema on the right leg but recently had a lot of stress and likely dietary indiscretion due to sister in the hospital and eventually dying in the hospital.  Patient trying to stop smoking and she was congratulated for that. Hopefully she will discontinue very soon. She has cut down the smoking significantly. Lasix helping well but she does not like to take it daily. 2/19/2021 CAD stable. Continue same medications  BP is elevated. Increase doxazosin to 8 mg twice daily. Follow home chart and visit with nurse practitioner 1 week approximately to check on the pressure. Check [facility] ASAP, preferably through today. Continue Eliquis for paroxysmal A. fib. Routine labs to check liver and lipids  Tobacco cessation discussed and reinforced again. She says she smokes 3 cigarettes a day and does not inhale. 3/25/2021  Blood pressure has improved with recent increase in doxazosin. Home chart reviewed blood pressure consistently 130s over 80s. CAD is stable with no symptoms presently, continue current medications. Recent LFT and lipids reviewed and discussed with patient lipids at goal.  She reports stop smoking cigarettes on March 19 and for this was congratulated     6/21/2021 has CHF exacerbation again like last year. Was in ER and improved with Lasix. Still has edema and needs to continue. Check echo. Reduce amlodipine so she can have better diuresis. CAD stable. She is cutting down her tobacco and was congratulated. Hopefully she can just let it go now. Labs as ordered. 9/27/2021 CHF is compensated now NYHA class II. CAD stable without any significant angina. She has almost quit smoking and smokes 1 cigarette a week as she says. She was congratulated for that. Blood pressure is minimally elevated but she is not sure whether she takes doxazosin at home she will call back and then we can adjust the medications. ICD is functioning normally but needs to be interrogated again. Lipids are well controlled. Echo has shown mild reduction in LV function. Dilated biatrial sizes. Medical treatment to continue. Diagnoses and all orders for this visit:    1. Atherosclerosis of native coronary artery of native heart without angina pectoris    2. Essential hypertension, benign  -     amLODIPine (NORVASC) 10 mg tablet; Take 1 Tablet by mouth daily. 3. Chronic diastolic congestive heart failure ([address]    4. Postsurgical aortocoronary bypass status    5. Tobacco abuse counseling    6. ICD (implantable cardioverter-defibrillator) in place-DDD ICD  -     GRAM EVAL (IN PERSON) IMPLANT DEVICE,CARDVERT/DEFIB,MULT LEAD  -     IMPLANTABLE CARDIOVASULAR DANIEL SYST    7. Paroxysmal atrial fibrillation (HCC)  -     AMB POC EKG ROUTINE W/ 12 LEADS, INTER & REP        Pertinent laboratory and test data reviewed and discussed with patient. See patient instructions also for other medical advice given    Medications Discontinued During This Encounter   Medication Reason    amLODIPine (Norvasc) 5 mg tablet        Follow-up and Dispositions    · Return in about 3 months (around 7/1/2022), or if symptoms worsen or fail to improve, for with pacer/ICD check, BP log x 4-5 days post med changes, with ekg. 4/1/2022 CAD stable. CHF is worsening delete that CHF is stable. Blood pressure is elevated. Increase Norvasc and follow home chart. Patient seems to be in permanent A. fib for few months now. Discussed with her and options will be to start sotalol as inpatient or amiodarone as outpatient. My first choice will be sotalol. She will think about it and let us know early next week. Tobacco cessation reinforced.      Addendum: While sitting in the office she change her mind and is ready to get admitted. We will admit her on Sunday with elective cardioversion Tuesday.

## 2022-04-01 NOTE — PROGRESS NOTES
1. Have you been to the ER, urgent care clinic since your last visit? Hospitalized since your last visit? No    2. Have you seen or consulted any other health care providers outside of the [address] [place] [address] since your last visit? Include any pap smears or colon screening. Yes Where: Colonoscopy and a thyroid biopsy @ [facility]     3. Since your last visit, have you had any of the following symptoms?     no    4. Have you had any blood work, X-rays or cardiac testing? Yes Where: [facility] for blood work     Requested: YES         5. Where do you normally have your labs drawn? [facility]    6. Do you need any refills today?    no

## 2022-04-01 NOTE — PATIENT INSTRUCTIONS
There are no discontinued medications. After the recommended changes have been made in blood pressure medicines, patient advised to keep BP/HR(pulse rate) chart twice daily and bring us results in next 4 to 5 days. Patient may send the results via \"My Chart\" if desired. Please rest for 5-10 minutes before checking blood pressure. Sit on a comfortable chair without crossing the legs and put your arm on a table. We recommend that you use an upper arm cuff. Check the blood pressure 3 times each time you check the blood pressure and record the lowest reading. If you check the blood pressure in both arms, use the higher reading. Learning About Benefits From Quitting Smoking  How does quitting smoking make you healthier? If you're thinking about quitting smoking, you may have a few reasons to be smoke-free. Your health may be one of them. · When you quit smoking, you lower your risks for cancer, lung disease, heart attack, stroke, blood vessel disease, and blindness from macular degeneration. · When you're smoke-free, you get sick less often, and you heal faster. You are less likely to get colds, flu, bronchitis, and pneumonia. · As a nonsmoker, you may find that your mood is better and you are less stressed. When and how will you feel healthier? Quitting has real health benefits that start from day 1 of being smoke-free. And the longer you stay smoke-free, the healthier you get and the better you feel. The first hours  · After just 20 minutes, your blood pressure and heart rate go down. That means there's less stress on your heart and blood vessels. · Within 12 hours, the level of carbon monoxide in your blood drops back to normal. That makes room for more oxygen. With more oxygen in your body, you may notice that you have more energy than when you smoked. After 2 weeks  · Your lungs start to work better. · Your risk of heart attack starts to drop.   After 1 month  · When your lungs are clear, you cough less and breathe deeper, so it's easier to be active. · Your sense of taste and smell return. That means you can enjoy food more than you have since you started smoking. Over the years  · Over the years, your risks of heart disease, heart attack, and stroke are lower. · After 10 years, your risk of dying from lung cancer is cut by about half. And your risk for many other types of cancer is lower too. How would quitting help others in your life? When you quit smoking, you improve the health of everyone who now breathes in your smoke. · Their heart, lung, and cancer risks drop, much like yours. · They are sick less. For babies and small children, living smoke-free means they're less likely to have ear infections, pneumonia, and bronchitis. · If you're a woman who is or will be pregnant someday, quitting smoking means a healthier . · Children who are close to you are less likely to become adult smokers. Where can you learn more? Go to http://www.gray.com/  Enter O319 in the search box to learn more about \"Learning About Benefits From Quitting Smoking. \"  Current as of: 2021               Content Version: 13.2  © 1327-9781 [employer]. Care instructions adapted under license by [employer] (which disclaims liability or warranty for this information). If you have questions about a medical condition or this instruction, always ask your healthcare professional. [place] [address] any warranty or liability for your use of this information.

## 2022-04-04 ENCOUNTER — ANESTHESIA EVENT (OUTPATIENT)
Dept: CARDIAC CATH/INVASIVE PROCEDURES | Age: 72
DRG: 309 | End: 2022-04-04
Payer: MEDICARE

## 2022-04-04 ENCOUNTER — HOSPITAL ENCOUNTER (INPATIENT)
Age: 72
LOS: 2 days | Discharge: HOME | DRG: 309 | End: 2022-04-06
Attending: INTERNAL MEDICINE | Admitting: INTERNAL MEDICINE
Payer: MEDICARE

## 2022-04-04 DIAGNOSIS — I48.19 PERSISTENT ATRIAL FIBRILLATION (HCC): ICD-10-CM

## 2022-04-04 DIAGNOSIS — I48.91 AFIB (HCC): ICD-10-CM

## 2022-04-04 LAB
ANION GAP SERPL CALC-SCNC: 6 MMOL/L (ref 3–18)
BUN SERPL-MCNC: 14 MG/DL (ref 7–18)
BUN/CREAT SERPL: 13 (ref 12–20)
CALCIUM SERPL-MCNC: 8.7 MG/DL (ref 8.5–10.1)
CHLORIDE SERPL-SCNC: 111 MMOL/L (ref 100–111)
CO2 SERPL-SCNC: 28 MMOL/L (ref 21–32)
CREAT SERPL-MCNC: 1.05 MG/DL (ref 0.6–1.3)
GLUCOSE SERPL-MCNC: 112 MG/DL (ref 74–99)
MAGNESIUM SERPL-MCNC: 2.1 MG/DL (ref 1.6–2.6)
POTASSIUM SERPL-SCNC: 3.5 MMOL/L (ref 3.5–5.5)
SODIUM SERPL-SCNC: 145 MMOL/L (ref 136–145)

## 2022-04-04 PROCEDURE — 36415 COLL VENOUS BLD VENIPUNCTURE: CPT

## 2022-04-04 PROCEDURE — 74011000250 HC RX REV CODE- 250: Performed by: PHYSICIAN ASSISTANT

## 2022-04-04 PROCEDURE — 65270000029 HC RM PRIVATE

## 2022-04-04 PROCEDURE — 93005 ELECTROCARDIOGRAM TRACING: CPT

## 2022-04-04 PROCEDURE — 80048 BASIC METABOLIC PNL TOTAL CA: CPT

## 2022-04-04 PROCEDURE — 74011250637 HC RX REV CODE- 250/637: Performed by: PHYSICIAN ASSISTANT

## 2022-04-04 PROCEDURE — 83735 ASSAY OF MAGNESIUM: CPT

## 2022-04-04 RX ORDER — SODIUM CHLORIDE 0.9 % (FLUSH) 0.9 %
5-40 SYRINGE (ML) INJECTION AS NEEDED
Status: DISCONTINUED | OUTPATIENT
Start: 2022-04-04 | End: 2022-04-06 | Stop reason: HOSPADM

## 2022-04-04 RX ORDER — POLYETHYLENE GLYCOL 3350 17 G/17G
17 POWDER, FOR SOLUTION ORAL DAILY PRN
Status: DISCONTINUED | OUTPATIENT
Start: 2022-04-04 | End: 2022-04-06 | Stop reason: HOSPADM

## 2022-04-04 RX ORDER — DOXAZOSIN 4 MG/1
8 TABLET ORAL DAILY
Status: DISCONTINUED | OUTPATIENT
Start: 2022-04-05 | End: 2022-04-06 | Stop reason: HOSPADM

## 2022-04-04 RX ORDER — ROSUVASTATIN CALCIUM 20 MG/1
40 TABLET, COATED ORAL
Status: DISCONTINUED | OUTPATIENT
Start: 2022-04-04 | End: 2022-04-06 | Stop reason: HOSPADM

## 2022-04-04 RX ORDER — AMLODIPINE BESYLATE 10 MG/1
10 TABLET ORAL DAILY
Status: DISCONTINUED | OUTPATIENT
Start: 2022-04-05 | End: 2022-04-06 | Stop reason: HOSPADM

## 2022-04-04 RX ORDER — ACETAMINOPHEN 325 MG/1
650 TABLET ORAL
Status: DISCONTINUED | OUTPATIENT
Start: 2022-04-04 | End: 2022-04-06 | Stop reason: HOSPADM

## 2022-04-04 RX ORDER — EZETIMIBE 10 MG/1
10 TABLET ORAL DAILY
Status: DISCONTINUED | OUTPATIENT
Start: 2022-04-05 | End: 2022-04-06 | Stop reason: HOSPADM

## 2022-04-04 RX ORDER — SODIUM CHLORIDE 0.9 % (FLUSH) 0.9 %
5-40 SYRINGE (ML) INJECTION EVERY 8 HOURS
Status: DISCONTINUED | OUTPATIENT
Start: 2022-04-04 | End: 2022-04-06 | Stop reason: HOSPADM

## 2022-04-04 RX ORDER — FUROSEMIDE 20 MG/1
20 TABLET ORAL DAILY PRN
Status: DISCONTINUED | OUTPATIENT
Start: 2022-04-04 | End: 2022-04-06 | Stop reason: HOSPADM

## 2022-04-04 RX ORDER — SOTALOL HYDROCHLORIDE 80 MG/1
80 TABLET ORAL 2 TIMES DAILY
Status: DISCONTINUED | OUTPATIENT
Start: 2022-04-04 | End: 2022-04-05

## 2022-04-04 RX ORDER — METOPROLOL TARTRATE 50 MG/1
100 TABLET ORAL 2 TIMES DAILY
Status: DISCONTINUED | OUTPATIENT
Start: 2022-04-04 | End: 2022-04-04

## 2022-04-04 RX ORDER — ACETAMINOPHEN 650 MG/1
650 SUPPOSITORY RECTAL
Status: DISCONTINUED | OUTPATIENT
Start: 2022-04-04 | End: 2022-04-06 | Stop reason: HOSPADM

## 2022-04-04 RX ADMIN — SOTALOL HYDROCHLORIDE 80 MG: 80 TABLET ORAL at 22:28

## 2022-04-04 RX ADMIN — ROSUVASTATIN CALCIUM 40 MG: 20 TABLET, FILM COATED ORAL at 22:28

## 2022-04-04 RX ADMIN — SODIUM CHLORIDE, PRESERVATIVE FREE 10 ML: 5 INJECTION INTRAVENOUS at 22:29

## 2022-04-04 RX ADMIN — APIXABAN 5 MG: 5 TABLET, FILM COATED ORAL at 22:28

## 2022-04-04 NOTE — PROGRESS NOTES
[id number]:  Received pt via wheelchair into CVT SD room 2301 Direct Admission per Dr Richy Whitaker order. Settled into room. Complete admit assessment performed. AAOx4. Denies CP, pressure, SOB or discomforts. Changed into hospital gown. Cardiac monitoring applied. Controlled Atrial Fib. VSS. #22 gauge PIV started left forearm, saline lock. Oriented to room and plan of care. Full fall precautions in effect. Chart reviewed. No admit orders. Cardiology provider on call paged; CORINNE Sanchez updated to pt's arrival, she spoke with cardiologist, new admit orders received including diet and medications. Per Ap G[place], [address] pt will have cardioversion Wed, not tomorrow Tues so does not need to be NPO after midnight. Patient informed this info. Pt states she will notify her family of this info. Dinner food and drink obtained, served to patient. 1900:  Shift change report given to Freddy Penaloza RN with bedside rounds.

## 2022-04-05 ENCOUNTER — ANESTHESIA (OUTPATIENT)
Dept: CARDIAC CATH/INVASIVE PROCEDURES | Age: 72
DRG: 309 | End: 2022-04-05
Payer: MEDICARE

## 2022-04-05 DIAGNOSIS — I25.10 ATHEROSCLEROSIS OF NATIVE CORONARY ARTERY OF NATIVE HEART WITHOUT ANGINA PECTORIS: ICD-10-CM

## 2022-04-05 DIAGNOSIS — I10 ESSENTIAL HYPERTENSION, BENIGN: ICD-10-CM

## 2022-04-05 DIAGNOSIS — I48.0 PAROXYSMAL ATRIAL FIBRILLATION (HCC): Primary | ICD-10-CM

## 2022-04-05 LAB
ANION GAP SERPL CALC-SCNC: 5 MMOL/L (ref 3–18)
ATRIAL RATE: 288 BPM
ATRIAL RATE: 288 BPM
ATRIAL RATE: 40 BPM
ATRIAL RATE: 441 BPM
BUN SERPL-MCNC: 11 MG/DL (ref 7–18)
BUN/CREAT SERPL: 12 (ref 12–20)
CALCIUM SERPL-MCNC: 8.8 MG/DL (ref 8.5–10.1)
CALCULATED R AXIS, ECG10: 34 DEGREES
CALCULATED R AXIS, ECG10: 44 DEGREES
CALCULATED R AXIS, ECG10: 62 DEGREES
CALCULATED R AXIS, ECG10: 68 DEGREES
CALCULATED T AXIS, ECG11: -37 DEGREES
CALCULATED T AXIS, ECG11: -48 DEGREES
CALCULATED T AXIS, ECG11: -50 DEGREES
CALCULATED T AXIS, ECG11: -63 DEGREES
CHLORIDE SERPL-SCNC: 112 MMOL/L (ref 100–111)
CO2 SERPL-SCNC: 26 MMOL/L (ref 21–32)
CREAT SERPL-MCNC: 0.94 MG/DL (ref 0.6–1.3)
DIAGNOSIS, 93000: NORMAL
GLUCOSE SERPL-MCNC: 80 MG/DL (ref 74–99)
MAGNESIUM SERPL-MCNC: 2 MG/DL (ref 1.6–2.6)
POTASSIUM SERPL-SCNC: 3.3 MMOL/L (ref 3.5–5.5)
Q-T INTERVAL, ECG07: 440 MS
Q-T INTERVAL, ECG07: 458 MS
Q-T INTERVAL, ECG07: 464 MS
Q-T INTERVAL, ECG07: 464 MS
QRS DURATION, ECG06: 86 MS
QRS DURATION, ECG06: 90 MS
QRS DURATION, ECG06: 92 MS
QRS DURATION, ECG06: 94 MS
QTC CALCULATION (BEZET), ECG08: 428 MS
QTC CALCULATION (BEZET), ECG08: 438 MS
QTC CALCULATION (BEZET), ECG08: 451 MS
QTC CALCULATION (BEZET), ECG08: 451 MS
SODIUM SERPL-SCNC: 143 MMOL/L (ref 136–145)
VENTRICULAR RATE, ECG03: 55 BPM
VENTRICULAR RATE, ECG03: 57 BPM

## 2022-04-05 PROCEDURE — 93005 ELECTROCARDIOGRAM TRACING: CPT

## 2022-04-05 PROCEDURE — 99222 1ST HOSP IP/OBS MODERATE 55: CPT | Performed by: INTERNAL MEDICINE

## 2022-04-05 PROCEDURE — 65270000029 HC RM PRIVATE

## 2022-04-05 PROCEDURE — 74011000250 HC RX REV CODE- 250: Performed by: PHYSICIAN ASSISTANT

## 2022-04-05 PROCEDURE — 36415 COLL VENOUS BLD VENIPUNCTURE: CPT

## 2022-04-05 PROCEDURE — 80048 BASIC METABOLIC PNL TOTAL CA: CPT

## 2022-04-05 PROCEDURE — 83735 ASSAY OF MAGNESIUM: CPT

## 2022-04-05 PROCEDURE — 2709999900 HC NON-CHARGEABLE SUPPLY

## 2022-04-05 PROCEDURE — 74011250637 HC RX REV CODE- 250/637: Performed by: PHYSICIAN ASSISTANT

## 2022-04-05 PROCEDURE — 74011250637 HC RX REV CODE- 250/637: Performed by: INTERNAL MEDICINE

## 2022-04-05 RX ORDER — DOXAZOSIN 8 MG/1
8 TABLET ORAL 2 TIMES DAILY
Qty: 180 TABLET | Refills: 3 | Status: SHIPPED | OUTPATIENT
Start: 2022-04-05

## 2022-04-05 RX ORDER — HYDRALAZINE HYDROCHLORIDE 20 MG/ML
20 INJECTION INTRAMUSCULAR; INTRAVENOUS
Status: DISCONTINUED | OUTPATIENT
Start: 2022-04-05 | End: 2022-04-06 | Stop reason: HOSPADM

## 2022-04-05 RX ORDER — METOPROLOL TARTRATE 100 MG/1
100 TABLET ORAL 2 TIMES DAILY
Qty: 180 TABLET | Refills: 3 | Status: SHIPPED | OUTPATIENT
Start: 2022-04-05 | End: 2022-04-06

## 2022-04-05 RX ORDER — EZETIMIBE 10 MG/1
10 TABLET ORAL DAILY
Qty: 90 TABLET | Refills: 2 | Status: SHIPPED | OUTPATIENT
Start: 2022-04-05

## 2022-04-05 RX ORDER — POTASSIUM CHLORIDE 7.45 MG/ML
10 INJECTION INTRAVENOUS
Status: DISCONTINUED | OUTPATIENT
Start: 2022-04-05 | End: 2022-04-05

## 2022-04-05 RX ORDER — SOTALOL HYDROCHLORIDE 80 MG/1
80 TABLET ORAL ONCE
Status: COMPLETED | OUTPATIENT
Start: 2022-04-05 | End: 2022-04-05

## 2022-04-05 RX ORDER — ROSUVASTATIN CALCIUM 40 MG/1
40 TABLET, COATED ORAL
Qty: 30 TABLET | Refills: 4 | Status: SHIPPED | OUTPATIENT
Start: 2022-04-05 | End: 2022-07-18

## 2022-04-05 RX ORDER — POTASSIUM CHLORIDE 20 MEQ/1
40 TABLET, EXTENDED RELEASE ORAL
Status: COMPLETED | OUTPATIENT
Start: 2022-04-05 | End: 2022-04-05

## 2022-04-05 RX ORDER — SOTALOL HYDROCHLORIDE 80 MG/1
80 TABLET ORAL 2 TIMES DAILY
Status: DISCONTINUED | OUTPATIENT
Start: 2022-04-06 | End: 2022-04-06 | Stop reason: HOSPADM

## 2022-04-05 RX ADMIN — SOTALOL HYDROCHLORIDE 80 MG: 80 TABLET ORAL at 09:38

## 2022-04-05 RX ADMIN — SODIUM CHLORIDE, PRESERVATIVE FREE 10 ML: 5 INJECTION INTRAVENOUS at 13:44

## 2022-04-05 RX ADMIN — APIXABAN 5 MG: 5 TABLET, FILM COATED ORAL at 09:38

## 2022-04-05 RX ADMIN — APIXABAN 5 MG: 5 TABLET, FILM COATED ORAL at 21:10

## 2022-04-05 RX ADMIN — AMLODIPINE BESYLATE 10 MG: 10 TABLET ORAL at 09:38

## 2022-04-05 RX ADMIN — DOXAZOSIN 8 MG: 4 TABLET ORAL at 09:38

## 2022-04-05 RX ADMIN — EZETIMIBE 10 MG: 10 TABLET ORAL at 09:37

## 2022-04-05 RX ADMIN — SODIUM CHLORIDE, PRESERVATIVE FREE 10 ML: 5 INJECTION INTRAVENOUS at 21:15

## 2022-04-05 RX ADMIN — POTASSIUM CHLORIDE 40 MEQ: 20 TABLET, EXTENDED RELEASE ORAL at 10:16

## 2022-04-05 RX ADMIN — SODIUM CHLORIDE, PRESERVATIVE FREE 10 ML: 5 INJECTION INTRAVENOUS at 05:32

## 2022-04-05 RX ADMIN — ROSUVASTATIN CALCIUM 40 MG: 20 TABLET, FILM COATED ORAL at 21:10

## 2022-04-05 RX ADMIN — SOTALOL HYDROCHLORIDE 80 MG: 80 TABLET ORAL at 21:10

## 2022-04-05 NOTE — PROGRESS NOTES
Reason for Admission:  A-fib ([facility] [address] [I48.91]                 RUR Score:    9            Plan for utilizing home health:    No                       Likelihood of Readmission:   LOW                         Transition of Care Plan:              Initial assessment completed with patient. Cognitive status of patient: oriented to time, place, person and situation. Face sheet information confirmed:  yes. The patient designates her cousin Munir Ortegas  to participate in her discharge plan and to receive any needed information. This patient lives in a  home with patient. Patient is able to navigate steps as needed. Prior to hospitalization, patient was considered to be independent with ADLs/IADLS : yes . Patient has a current ACP document on file: no      Healthcare Decision Maker:     Click here to complete [address] including selection of the Healthcare Decision Maker Relationship (ie \"Primary\")    The cousin will be available to transport patient home upon discharge. The patient already has [employer], W/C, Shower chair, [facility] medical equipment available in the home. Patient is not currently active with home health. Patient has stayed in a skilled nursing facility or rehab. Was  stay within last 60 days : no. This patient is on dialysis :no    Currently, the discharge plan is Home. The patient states that she can obtain her medications from the pharmacy, and take her medications as directed. Patient's current insurance is [facility] Complete       Care Management Interventions  PCP Verified by CM: Yes  Palliative Care Criteria Met (RRAT>21 & CHF Dx)?: No  Mode of Transport at Discharge:  Other (see comment) (family)  Transition of Care Consult (CM Consult): Discharge Planning  Support Systems: Other Family Member(s)  Confirm Follow Up Transport: Family  Discharge Location  Patient Expects to be Discharged to[de-identified] Home with family assistance        AYANNA Sorenson RN  Care Management  Pager: [phone]

## 2022-04-05 NOTE — PROGRESS NOTES
Bedside and Verbal shift change report given to Frederick Rod RN (oncoming nurse) by Annamarie Bustamante RN (offgoing nurse). Report included the following information SBAR, Kardex, Intake/Output, MAR and Cardiac Rhythm Afib.

## 2022-04-05 NOTE — PROGRESS NOTES
Bedside and Verbal shift change report given to Lakesha Aguilar (oncoming nurse) by Quang Medina RN (offgoing nurse). Report included the following information SBAR, Procedure Summary, MAR and Cardiac Rhythm Afibwith LVR. 1000- Dr. Shubahm Ulrich in room. Said procedure for cardiovert will be tomorrow. Verbal orders for PO potassium 40mEq (instead of the IV potassium.) Also verbal orders for ECG daily. Dr. Jalloh Cons also said to relay to the nurse to give sotalol tomorrow at 0600.      1145- ECG complete and filed.

## 2022-04-05 NOTE — H&P
Pt seen and examined, chart reviewed prior to procedure  No changes since last office visit. See office visit from 4/1/2022 patient was admitted yesterday on 4/4/2022 instead of Sunday which was planned. Cardioversion that was scheduled for today on 4/5/2022 had to be postponed for tomorrow so patient could have adequate sotalol levels in her body. I discussed this with patient and she is in agreement. Office Visit    4/1/2022  [facility] Parisa Castro MD    Cardiology  Atherosclerosis of native coronary artery of native heart without angina pectoris +6 more    Dx  Follow-up ; Referred by Darlene Winston MD    Reason for Visit       Progress Notes  Eamon Duckworth MD (Physician) [facility]     HISTORY OF PRESENT ILLNESS  Christy Ying is a 66 y.o. female.     Follow-up of CAD, status post CABG, hypertension, paroxysmal A. fib, status post ICD, hyperlipidemia.     6/20 seen for abnormal stress test which was done in [facility] hospital in March 2024 preop testing for vascular surgery and was found to be abnormal.  She was recently seen 3 weeks ago when she did not mention anything about stress test being done in an outside hospital.     3/2021 patient presents to follow-up for hypertension and lab results        Palpitations   The history is provided by the medical records (PAF on ICD interrogation). Associated symptoms include lower extremity edema and shortness of breath. Pertinent negatives include no fever, no malaise/fatigue, no chest pain, no claudication, no orthopnea, no PND, no nausea, no vomiting, no headaches, no dizziness and no cough. Her past medical history is significant for hypertension. Follow-up  Associated symptoms include shortness of breath. Pertinent negatives include no chest pain and no headaches.         Review of Systems   Constitutional: Negative for chills, fever, malaise/fatigue and weight loss. HENT: Negative for nosebleeds.     Eyes: Negative for discharge. Respiratory: Positive for shortness of breath. Negative for cough and wheezing. Cardiovascular: Positive for palpitations and leg swelling. Negative for chest pain, orthopnea, claudication and PND. Gastrointestinal: Negative for diarrhea, nausea and vomiting. Genitourinary: Negative for dysuria and hematuria. Musculoskeletal: Negative for joint pain. Skin: Negative for rash. Neurological: Negative for dizziness, seizures, loss of consciousness and headaches. Endo/Heme/Allergies: Negative for polydipsia. Does not bruise/bleed easily. Psychiatric/Behavioral: Negative for depression and substance abuse. The patient does not have insomnia.             Allergies   Allergen Reactions    Losartan Angioedema       Tongue swelling but not on lips              Past Medical History:   Diagnosis Date    Arthritis      Automatic implantable cardiac defibrillator in situ      Coronary atherosclerosis of native coronary artery       CABG 10/02 with LIMA to LAD and SVG to RCA    Depression      Essential hypertension      Headache(784.0)      Other and unspecified hyperlipidemia       9/12 Low density lipoproteins are at goal, triglycerides are at goal, high density lipoproteins are at goal.    Postsurgical aortocoronary bypass status 2002    Presence of permanent cardiac pacemaker-DDD 11/6/2015     OFFICE CHECK : 8/14 nl function, +AHR rare(changed detection to over 150/mt); 11/15 nl function     Unspecified arthropathy, lower leg       S/P Left knee replacement. Had repeat surgery's for her left knee.     UTI (urinary tract infection)                 Family History   Problem Relation Age of Onset    OSTEOARTHRITIS Other           not specified    Hypertension Other           not specified    Sudden Death Neg Hx      Heart Attack Neg Hx           Social History            Tobacco Use    Smoking status: Current Every Day Smoker       Packs/day: 0.05       Years: 6.00       Pack years: 0.30       Start date: 2015    Smokeless tobacco: Never Used    Tobacco comment: pt states she do not smoke every day   Substance Use Topics    Alcohol use: No    Drug use: Not on file              Current Outpatient Medications   Medication Sig    doxazosin (CARDURA) 8 mg tablet Take 1 Tablet by mouth two (2) times a day.  metoprolol tartrate (LOPRESSOR) 100 mg IR tablet take 1 tablet by mouth twice a day    albuterol (PROVENTIL HFA, VENTOLIN HFA, PROAIR HFA) 90 mcg/actuation inhaler Take 2 Puffs by inhalation.  amLODIPine (Norvasc) 5 mg tablet Take 1 Tablet by mouth daily.  apixaban (Eliquis) 5 mg tablet take 1 tablet by mouth twice a day    furosemide (LASIX) 20 mg tablet take 1 tablet by mouth once daily if needed for edema    multivitamin (ONE A DAY) tablet Take 1 Tab by mouth daily.  cyanocobalamin (VITAMIN B-12) 1,000 mcg sublingual tablet Take 1,000 mcg by mouth. Every other friday    acetaminophen (TYLENOL) 500 mg tablet Take  by mouth every six (6) hours as needed for Pain.  ezetimibe (ZETIA) 10 mg tablet Take  by mouth daily. (Patient not taking: Reported on 9/27/2021)    rosuvastatin (CRESTOR) 40 mg tablet Take 1 Tab by mouth nightly.      No current facility-administered medications for this visit.               Past Surgical History:   Procedure Laterality Date    HX CORONARY ARTERY BYPASS GRAFT   10/2002   [facility] ORTHOPAEDIC   [id number]     Reduced two component revision using the Sigma System. Lateral release. Suture anchor repair of medial collateral ligament. [facility]   [id number]     Left knee arthrofibrosis, status post significant scarring, status post left total knee replacement    DE CARDIAC SURG PROCEDURE UNLIST   11/09     gen change-obici    VASCULAR SURGERY PROCEDURE UNLIST   2005     Aorto biffem.   Dr. Juan Mcnair         Diagnostic Studies:  CARDIOLOGY STUDIES 10/5/2017   Pharmacological Nuclear Stress Test Result mild small infbasal ischemia, 73%EF   Some recent data might be hidden      3/20 nuclear stress test  MARIANN REST/STRESS MULTIPLE [facility]  Component Name Value Ref Range   EF Nuc 52     Result Impression    THIS MYOCARDIAL PERFUSION STUDY IS ABNORMAL   THIS IS A HIGH RISK STUDY   ABNORMAL NUCLEAR STUDY SUGGESTIVE OF TWO-VESSEL DISEASE     ON INITIAL STRESS IMAGES THERE IS A MODERATE TO LARGE AREA OF DIMINISHED UPTAKE IN THE ISOTOPE   THROUGHOUT THE ANTERIOR SEPTAL AND THE LATERAL WALL     ON THE REST IMAGES THERE IS ENHANCED UPTAKE THROUGHOUT THE ANTERIOR SEPTAL AND LATERAL WALL     THE GATED ACQUISITION SHOWS LATERAL WALL HYPOKINESIS AND AN EJECTION FRACTION ESTIMATED AT   50%     THE ABOVE FINDINGS ARE CONSISTENT WITH TWO-VESSEL ISCHEMIA INVOLVING THE PROXIMAL LAD AND   [facility] ARTERY   8/21 echo  Interpretation Summary     · LV: Estimated LVEF is 40 - 45%. Normal cavity size. Moderate concentric hypertrophy. Mild hypokinesis of the mid inferior and mid inferoseptal wall(s). Severe hypokinesis of the basal inferior and basal inferoseptal wall(s). Severe (grade 3) left ventricular diastolic dysfunction. · LA: Moderately dilated left atrium. Left Atrium volume index is 48.02 mL/m2. · RV: Pacer/ICD present. · RA: Mildly dilated right atrium. · MV: Mitral valve thickening. Mitral annular calcification. · TV: Mild tricuspid valve regurgitation is present. Right Ventricular Arterial Pressure (RVSP) is 35 mmHg. Pulmonary hypertension not suggested by Doppler findings. · PV: Mild pulmonic valve regurgitation is present.      Comparison Study Information     Prior Study     When compared to the previous study from 1/27/09, the overall left ventricular ejection fraction now appears mildly reduced, there is now moderate left ventricular hypertrophy, the wall motion abnormalities appear to be new, and there is now bi atrial enlargement.            Visit Vitals  Ht 5' 10\" (1.778 m)   BMI 24.51 kg/m²         Ms.  Rick Trino has a reminder for a \"due or due soon\" health maintenance. I have asked that she contact her primary care provider for follow-up on this health maintenance.        Physical Exam  Vitals and nursing note reviewed. Constitutional:       General: She is not in acute distress. Appearance: She is well-developed. Comments: kyphosis   HENT:      Head: Normocephalic and atraumatic. Eyes:      General: No scleral icterus. Right eye: No discharge. Left eye: No discharge. Neck:      Thyroid: No thyromegaly. Vascular: No carotid bruit or JVD. Cardiovascular:      Rate and Rhythm: Normal rate and regular rhythm. Pulses: Intact distal pulses. Heart sounds: Normal heart sounds, S1 normal and S2 normal. No murmur heard. No friction rub. No gallop. Pulmonary:      Effort: Pulmonary effort is normal.      Breath sounds: Normal breath sounds. No wheezing or rales. Abdominal:      Palpations: Abdomen is soft. There is no mass. Tenderness: There is no abdominal tenderness. Musculoskeletal:         General: Deformity: 1-2. Normal range of motion. Cervical back: Neck supple. Right lower leg: No edema. Left lower leg: No edema. Comments: Cant put legs straight   Skin:     General: Skin is warm and dry. Findings: No rash. Neurological:      Mental Status: She is alert and oriented to person, place, and time. Psychiatric:         Behavior: Behavior normal.      wounds healing well left subclavicular area      ASSESSMENT and PLAN        AFib / Flutter seen on interrogation of ICD  Started Eliquis 5 mg / BID  HLD: 5/17 LDL69; 2/16 LDL81;   Results for Erika Key (MRN [id number]) as of 9/27/2021 10:45    Ref.  Range 7/7/2021 00:00   Triglyceride Latest Ref Range: 0 - 149 mg/dL 62   Cholesterol, total Latest Ref Range: 100 - 199 mg/dL 125   HDL Cholesterol Latest Ref Range: >39 mg/dL 49   VLDL, calculated Latest Ref Range: 5 - 40 mg/dL 13   LDL, calculated Latest Ref Range: 0 - 99 mg/dL 63            BiV ICD: gen change 11/09; 3/17  CARE LINK 10/10; 8/12; 4/13; 8/13; 2/16; 5/16; 8/16  nl function, nl volume; 2/17 nl function; DONNY; 8/17; 1/19; 11/19 nl function, nl volume; 2/20 normal function, normal volume, PAF; 6/20 normal function, recent increase in volume; 3/21 normal function, high-volume, frequent PAF; 6/21 normal function, nearly persistent A. fib, intermittent high-volume;  OFFICE CHECK : 8/14 nl function, +AHR rare(changed detection to over 150/mt); 11/15; 11/16; 3/17 nl function, nl vol; 4/18 nl function, frequent AHR(likely AFlutter), nl volume; Normal function, normal volume, AT/AF episodes are far field sensing events;  4/22 normal function, normal volume, permanent AF for many months now. Delete that;        6/1/2020 blood pressure is elevated. Increase Cardura to 8 mg at bedtime. Patient cannot check her blood pressures at home even though she has a machine. Continue Eliquis due to frequent AT/A. fib on ICD interrogation. Follow-up [facility] every 3 months. Office check in 6 months  Stable CAD. Continue present medications. She has developed mild edema on the right leg but recently had a lot of stress and likely dietary indiscretion due to sister in the hospital and eventually dying in the hospital.  Patient trying to stop smoking and she was congratulated for that. Hopefully she will discontinue very soon. She has cut down the smoking significantly.     Lasix helping well but she does not like to take it daily.     2/19/2021 CAD stable. Continue same medications  BP is elevated. Increase doxazosin to 8 mg twice daily. Follow home chart and visit with nurse practitioner 1 week approximately to check on the pressure. Check [facility] ASAP, preferably through today. Continue Eliquis for paroxysmal A. fib. Routine labs to check liver and lipids  Tobacco cessation discussed and reinforced again.   She says she smokes 3 cigarettes a day and does not inhale.     3/25/2021  Blood pressure has improved with recent increase in doxazosin. Home chart reviewed blood pressure consistently 130s over 80s. CAD is stable with no symptoms presently, continue current medications. Recent LFT and lipids reviewed and discussed with patient lipids at goal.  She reports stop smoking cigarettes on March 19 and for this was congratulated   6/21/2021 has CHF exacerbation again like last year. Was in ER and improved with Lasix. Still has edema and needs to continue. Check echo. Reduce amlodipine so she can have better diuresis. CAD stable. She is cutting down her tobacco and was congratulated. Hopefully she can just let it go now. Labs as ordered. 9/27/2021 CHF is compensated now NYHA class II. CAD stable without any significant angina. She has almost quit smoking and smokes 1 cigarette a week as she says. She was congratulated for that. Blood pressure is minimally elevated but she is not sure whether she takes doxazosin at home she will call back and then we can adjust the medications. ICD is functioning normally but needs to be interrogated again. Lipids are well controlled. Echo has shown mild reduction in LV function. Dilated biatrial sizes. Medical treatment to continue.        Diagnoses and all orders for this visit:     1. Atherosclerosis of native coronary artery of native heart without angina pectoris     2. Essential hypertension, benign  -     amLODIPine (NORVASC) 10 mg tablet; Take 1 Tablet by mouth daily.     3. Chronic diastolic congestive heart failure (HCC)     4. Postsurgical aortocoronary bypass status     5. Tobacco abuse counseling     6.  ICD (implantable cardioverter-defibrillator) in place-DDD ICD  -     GRAM EVAL (IN PERSON) IMPLANT DEVICE,CARDVERT/DEFIB,MULT LEAD  -     IMPLANTABLE CARDIOVASULAR DANIEL SYST     7. Paroxysmal atrial fibrillation (HCC)  -     AMB POC EKG ROUTINE W/ 12 LEADS, INTER & REP           Pertinent laboratory and test data reviewed and discussed with patient. See patient instructions also for other medical advice given          Medications Discontinued During This Encounter   Medication Reason    amLODIPine (Norvasc) 5 mg tablet           Follow-up and Dispositions  ·   Return in about 3 months (around 7/1/2022), or if symptoms worsen or fail to improve, for with pacer/ICD check, BP log x 4-5 days post med changes, with ekg.         4/1/2022 CAD stable. CHF is worsening delete that CHF is stable. Blood pressure is elevated. Increase Norvasc and follow home chart. Patient seems to be in permanent A. fib for few months now. Discussed with her and options will be to start sotalol as inpatient or amiodarone as outpatient. My first choice will be sotalol. She will think about it and let us know early next week. Tobacco cessation reinforced.      Addendum: While sitting in the office she change her mind and is ready to get admitted.   We will admit her on Sunday with elective cardioversion Tuesday.

## 2022-04-05 NOTE — CONSULTS
Cardiology Consult Note    Consultation request by Mirela Hills MD for advice/opinion related to evaluating sotalol loading. Date of  Admission: 4/4/2022  6:08 PM   Primary Care Physician:  Deborah Bowden MD       Assessment:     Hospital Problems  Date Reviewed: 4/1/2022          Codes Class Noted POA    A-fib [facility]) ICD-10-CM: I48.91  ICD-9-CM: 427.31  4/4/2022 Unknown            -Sotalol loading with elective electrical cardioversion scheduled for 4/6/22. -AFib. Rates well controlled. On Eliquis and Lopressor as outpatient.   -Chronic diastolic dysfunction. Volume status stable. On PRN lasix.   -HTN. Elevated, on amlodipine as outpatient. -s/p ICD   -HLD, on statin.   -Tobacco Abuse, cessation advised. Primary Cardiologist: Dr. Randol Essex. Plan:     NPO after MN for electrical cardioversion tomorrow am, unless patient converts to SR prior to. Continue sotalol BID with daily ECGs to monitor. Hold Sotalol for qtc > 500. Continued on Eliquis for stroke prophylaxis. Pressures elevated prior to receiving Amlodipine. Will place orders for IV hydralazine as needed for elevated pressures. Plan as above. Discussed with patient and nurses. History of Present Illness: This is a 66 y.o. female admitted for A-fib ([facility] [address] [I48.91]. Patient complains of: Rizwana Menjivar is a 66 y.o. female, pmhx as stated above, who is admitted for sotalol loading. She will be monitored closely while on Sotalol for 48 hrs. She is scheduled for elective, electrical cardioversion on 4/6/22, unless she converts to SR prior to the procedure. Currently she is stable w/o any complaints. Denies CP, SOB, palpitations, N/V/D, dizziness, orthopnea.      Cardiac risk factors: smoking/ tobacco exposure, dyslipidemia, hypertension, post-menopausal    Review of Symptoms:  Except as stated above include:  Constitutional:  negative  Respiratory:  negative  Cardiovascular:  negative  Gastrointestinal: negative  Genitourinary:  negative  Musculoskeletal:  Negative  Neurological:  Negative  Dermatological:  Negative  Endocrinological: Negative  Psychological:  Negative    A comprehensive review of systems was negative except for that written in the HPI. Past Medical History:     Past Medical History:   Diagnosis Date    Arthritis     Automatic implantable cardiac defibrillator in situ     Coronary atherosclerosis of native coronary artery     CABG 10/02 with LIMA to LAD and SVG to RCA    Depression     Essential hypertension     Headache(784.0)     Other and unspecified hyperlipidemia     9/12 Low density lipoproteins are at goal, triglycerides are at goal, high density lipoproteins are at goal.    Postsurgical aortocoronary bypass status 2002    Presence of permanent cardiac pacemaker-DDD 11/6/2015    OFFICE CHECK : 8/14 nl function, +AHR rare(changed detection to over 150/mt); 11/15 nl function     Unspecified arthropathy, lower leg     S/P Left knee replacement. Had repeat surgery's for her left knee.  UTI (urinary tract infection)          Social History:     Social History     Socioeconomic History    Marital status:    Tobacco Use    Smoking status: Current Every Day Smoker     Packs/day: 0.05     Years: 6.00     Pack years: 0.30     Start date: 2015    Smokeless tobacco: Never Used    Tobacco comment: pt states she do not smoke every day   Substance and Sexual Activity    Alcohol use: No        Family History:     Family History   Problem Relation Age of Onset    OSTEOARTHRITIS Other         not specified    Hypertension Other         not specified    Sudden Death Neg Hx     Heart Attack Neg Hx         Medications:      Allergies   Allergen Reactions    Losartan Angioedema     Tongue swelling but not on lips        Current Facility-Administered Medications   Medication Dose Route Frequency    potassium chloride (K-DUR, KLOR-CON M20) SR tablet 40 mEq  40 mEq Oral NOW    acetaminophen (TYLENOL) tablet 650 mg  650 mg Oral Q6H PRN    amLODIPine (NORVASC) tablet 10 mg  10 mg Oral DAILY    apixaban (ELIQUIS) tablet 5 mg  5 mg Oral BID    doxazosin (CARDURA) tablet 8 mg  8 mg Oral DAILY    ezetimibe (ZETIA) tablet 10 mg  10 mg Oral DAILY    furosemide (LASIX) tablet 20 mg  20 mg Oral DAILY PRN    rosuvastatin (CRESTOR) tablet 40 mg  40 mg Oral QHS    sodium chloride (NS) flush 5-40 mL  5-40 mL IntraVENous Q8H    sodium chloride (NS) flush 5-40 mL  5-40 mL IntraVENous PRN    sotaloL (BETAPACE) tablet 80 mg  80 mg Oral BID    sodium chloride (NS) flush 5-40 mL  5-40 mL IntraVENous Q8H    sodium chloride (NS) flush 5-40 mL  5-40 mL IntraVENous PRN    acetaminophen (TYLENOL) tablet 650 mg  650 mg Oral Q6H PRN    Or    acetaminophen (TYLENOL) suppository 650 mg  650 mg Rectal Q6H PRN    polyethylene glycol (MIRALAX) packet 17 g  17 g Oral DAILY PRN    ECG Reminder Note  1 Each Other Q12H    ELECTROLYTE REPLACEMENT PROTOCOL - Potassium   1 Each Other PRN    ELECTROLYTE REPLACEMENT PROTOCOL - Magnesium  1 Each Other PRN         Physical Exam:     Visit Vitals  BP (!) 181/83   Pulse 78   Temp 98.2 °F (36.8 °C)   Resp 18   Wt 79.6 kg (175 lb 8 oz)   SpO2 98%   BMI 25.18 kg/m²       TELE: AFIB    BP Readings from Last 3 Encounters:   04/05/22 (!) 181/83   04/01/22 (!) 155/53   09/27/21 (!) 143/68     Pulse Readings from Last 3 Encounters:   04/05/22 78   04/01/22 (!) 59   09/27/21 (!) 49     Wt Readings from Last 3 Encounters:   04/05/22 79.6 kg (175 lb 8 oz)   04/01/22 78.9 kg (174 lb)   09/27/21 77.5 kg (170 lb 12.8 oz)       General:  alert, cooperative, no distress, appears stated age  Neck:  no JVD  Lungs:  clear to auscultation bilaterally  Heart:  irregularly irregular rhythm  Abdomen:  abdomen is soft without significant tenderness, masses, organomegaly or guarding  Extremities:  extremities normal, atraumatic, no cyanosis or edema  Skin: Warm and dry.  no hyperpigmentation, vitiligo, or suspicious lesions  Neuro: alert, oriented x3, affect appropriate  Psych: non focal     Data Review:     No results for input(s): WBC, HGB, HCT, PLT, HGBEXT, HCTEXT, PLTEXT in the last 72 hours. Recent Labs     04/05/22 0717 04/04/22 2020    145   K 3.3* 3.5   * 111   CO2 26 28   GLU 80 112*   BUN 11 14   CREA 0.94 1.05   CA 8.8 8.7   MG 2.0 2.1       Results for orders placed or performed during the hospital encounter of 04/04/22   EKG, 12 LEAD, INITIAL   Result Value Ref Range    Ventricular Rate 57 BPM    Atrial Rate 288 BPM    QRS Duration 86 ms    Q-T Interval 464 ms    QTC Calculation (Bezet) 451 ms    Calculated R Axis 34 degrees    Calculated T Axis -50 degrees    Diagnosis       Atrial fibrillation with slow ventricular response with occasional   ventricular-paced complexes and with premature ventricular or aberrantly   conducted  complexes  T wave abnormality, consider inferior ischemia  T wave abnormality, consider anterolateral ischemia  Abnormal ECG  When compared with ECG of 05-APR-2022 00:06,  Vent.  rate has increased BY   2 BPM         All Cardiac Markers in the last 24 hours:  No results found for: CPK, CK, CKMMB, CKMB, RCK3, CKMBT, CKNDX, CKND1, MARIANN, TROPT, TROIQ, PAUL, TROPT, TNIPOC, BNP, BNPP    Last Lipid:    Lab Results   Component Value Date/Time    Cholesterol, total 125 07/07/2021 12:00 AM    HDL Cholesterol 49 07/07/2021 12:00 AM    LDL, calculated 63 07/07/2021 12:00 AM    LDL, calculated 69 05/24/2017 09:20 AM    Triglyceride 62 07/07/2021 12:00 AM       Cardiographics:     EKG Results     Procedure 720 Value Units Date/Time    EKG, 12 LEAD, INITIAL [[id number]] Collected: 04/05/22 0614    Order Status: Completed Updated: 04/05/22 0728     Ventricular Rate 57 BPM      Atrial Rate 288 BPM      QRS Duration 86 ms      Q-T Interval 464 ms      QTC Calculation (Bezet) 451 ms      Calculated R Axis 34 degrees      Calculated T Axis -50 degrees Diagnosis --     Atrial fibrillation with slow ventricular response with occasional   ventricular-paced complexes and with premature ventricular or aberrantly   conducted  complexes  T wave abnormality, consider inferior ischemia  T wave abnormality, consider anterolateral ischemia  Abnormal ECG  When compared with ECG of 05-APR-2022 00:06,  Vent. rate has increased BY   2 BPM      EKG, 12 LEAD, INITIAL [[id number]] Collected: 04/05/22 0006    Order Status: Completed Updated: 04/05/22 0728     Ventricular Rate 55 BPM      Atrial Rate 40 BPM      QRS Duration 90 ms      Q-T Interval 458 ms      QTC Calculation (Bezet) 438 ms      Calculated R Axis 62 degrees      Calculated T Axis -37 degrees      Diagnosis --     Atrial fibrillation with slow ventricular response with occasional   ventricular-paced complexes  T wave abnormality, consider inferior ischemia  Abnormal ECG  When compared with ECG of 04-APR-2022 20:34,  Vent. rate has decreased BY   2 BPM      EKG, 12 LEAD, INITIAL [[id number]] Collected: 04/04/22 2034    Order Status: Completed Updated: 04/05/22 0727     Ventricular Rate 57 BPM      Atrial Rate 441 BPM      QRS Duration 92 ms      Q-T Interval 440 ms      QTC Calculation (Bezet) 428 ms      Calculated R Axis 68 degrees      Calculated T Axis -48 degrees      Diagnosis --     Atrial fibrillation with slow ventricular response with occasional   ventricular-paced complexes  T wave abnormality, consider inferolateral ischemia  Abnormal ECG  No previous ECGs available      EKG, 12 LEAD, INITIAL [[id number]]     Order Status: Canceled     EKG, 12 LEAD, INITIAL [[id number]]     Order Status: Canceled     EKG, 12 LEAD, INITIAL [[id number]]     Order Status: Canceled     EKG, 12 LEAD, INITIAL [[id number]]     Order Status: Canceled         08/16/21    ECHO ADULT COMPLETE 08/16/2021 8/16/2021    Interpretation Summary  · LV: Estimated LVEF is 40 - 45%. Normal cavity size. Moderate concentric hypertrophy.  Mild hypokinesis of the mid inferior and mid inferoseptal wall(s). Severe hypokinesis of the basal inferior and basal inferoseptal wall(s). Severe (grade 3) left ventricular diastolic dysfunction. · LA: Moderately dilated left atrium. Left Atrium volume index is 48.02 mL/m2. · RV: Pacer/ICD present. · RA: Mildly dilated right atrium. · MV: Mitral valve thickening. Mitral annular calcification. · TV: Mild tricuspid valve regurgitation is present. Right Ventricular Arterial Pressure (RVSP) is 35 mmHg. Pulmonary hypertension not suggested by Doppler findings. · PV: Mild pulmonic valve regurgitation is present. Signed by: Carole Cates MD on 8/16/2021  4:01 PM      03/25/20    NM CARDIAC SPECT W STRS/REST SNGL  3/25/2020      06/16/20    CARDIAC PROCEDURE 06/16/2020 6/16/2020    Conclusion  · Procedure done via right femoral artery using 4 [place] catheters. · Patent LIMA to LAD. · 100% ostial occlusion of SVG to RCA. · 100% ostial left main stenosis. · 90% mid LAD and in a retrograde fashion the flow is noted to a large diagonal and native circumflex artery. · 80% ostial right coronary, 90% mid RCA and 100% distal RCA stenosis with faint left-to-right collaterals to right PDA. · Elevated LVEDP at 16 mmHg. Patient is essentially living from LIMA which supplies the entire left-sided circulation as the left main is totally occluded. Native RCA has severe disease in multiple segments but there are no bypassable distal vessels noted on the right side. However two-vessel bypass to large diagonal and native circumflex may be possible. We will take a surgical opinion and then make a final decision. In the meantime medical treatment and aggressive risk factor modification. Addendum by: Carole Cates MD on 6/16/2020 10:46 AM    Signed by: Carole Cates MD on 6/16/2020 10:44 AM      XR Results (most recent):  No results found for this or any previous visit.         Signed By: Terrance Amos PA-C April 5, 2022    I have personally and independently evaluated and examined the patient. All relevant labs and testing data are reviewed. I have personally reviewed all the diagnostic labs, available EKG imaging x-rays and other diagnostic data and incorporated them into my care. I am referencing APC's note. I participated in medical decision making. Care plan discussed and updated after review. More than 50% time spent reviewing data, examining patient and recommending assessment and plan.   Kashif Blake MD

## 2022-04-05 NOTE — PROGRESS NOTES
Problem: Falls - Risk of  Goal: *Absence of Falls  Description: Document Shannon Mcburney Fall Risk and appropriate interventions in the flowsheet.   Outcome: Progressing Towards Goal  Note: Fall Risk Interventions:  Mobility Interventions: Patient to call before getting OOB         Medication Interventions: Evaluate medications/consider consulting pharmacy,Patient to call before getting OOB,Teach patient to arise slowly    Elimination Interventions: Call light in reach,Patient to call for help with toileting needs,Toileting schedule/hourly rounds              Problem: Patient Education: Go to Patient Education Activity  Goal: Patient/Family Education  Outcome: Progressing Towards Goal     Problem: Pain  Goal: *Control of Pain  Outcome: Progressing Towards Goal     Problem: Patient Education: Go to Patient Education Activity  Goal: Patient/Family Education  Outcome: Progressing Towards Goal

## 2022-04-05 NOTE — PROGRESS NOTES
Problem: Falls - Risk of  Goal: *Absence of Falls  Description: Document Claire Ellis Fall Risk and appropriate interventions in the flowsheet.   Outcome: Progressing Towards Goal  Note: Fall Risk Interventions:  Mobility Interventions: Bed/chair exit alarm         Medication Interventions: Evaluate medications/consider consulting pharmacy    Elimination Interventions: Bed/chair exit alarm              Problem: Patient Education: Go to Patient Education Activity  Goal: Patient/Family Education  Outcome: Progressing Towards Goal     Problem: Pain  Goal: *Control of Pain  Outcome: Progressing Towards Goal     Problem: Patient Education: Go to Patient Education Activity  Goal: Patient/Family Education  Outcome: Progressing Towards Goal

## 2022-04-06 VITALS
SYSTOLIC BLOOD PRESSURE: 147 MMHG | RESPIRATION RATE: 16 BRPM | BODY MASS INDEX: 25.64 KG/M2 | HEART RATE: 52 BPM | OXYGEN SATURATION: 99 % | DIASTOLIC BLOOD PRESSURE: 68 MMHG | TEMPERATURE: 97.8 F | WEIGHT: 178.7 LBS

## 2022-04-06 LAB
ATRIAL RATE: 250 BPM
ATRIAL RATE: 50 BPM
ATRIAL RATE: 60 BPM
CALCULATED R AXIS, ECG10: 48 DEGREES
CALCULATED R AXIS, ECG10: 53 DEGREES
CALCULATED R AXIS, ECG10: 54 DEGREES
CALCULATED T AXIS, ECG11: -50 DEGREES
CALCULATED T AXIS, ECG11: -56 DEGREES
CALCULATED T AXIS, ECG11: -91 DEGREES
DIAGNOSIS, 93000: NORMAL
P-R INTERVAL, ECG05: 182 MS
Q-T INTERVAL, ECG07: 480 MS
Q-T INTERVAL, ECG07: 494 MS
Q-T INTERVAL, ECG07: 500 MS
QRS DURATION, ECG06: 86 MS
QRS DURATION, ECG06: 88 MS
QRS DURATION, ECG06: 94 MS
QTC CALCULATION (BEZET), ECG08: 437 MS
QTC CALCULATION (BEZET), ECG08: 446 MS
QTC CALCULATION (BEZET), ECG08: 482 MS
VENTRICULAR RATE, ECG03: 49 BPM
VENTRICULAR RATE, ECG03: 50 BPM
VENTRICULAR RATE, ECG03: 56 BPM

## 2022-04-06 PROCEDURE — 00410 ANES INTEG SYS CONV ARRHYT: CPT | Performed by: NURSE ANESTHETIST, CERTIFIED REGISTERED

## 2022-04-06 PROCEDURE — 99100 ANES PT EXTEME AGE<1 YR&>70: CPT | Performed by: NURSE ANESTHETIST, CERTIFIED REGISTERED

## 2022-04-06 PROCEDURE — 92960 CARDIOVERSION ELECTRIC EXT: CPT | Performed by: INTERNAL MEDICINE

## 2022-04-06 PROCEDURE — 5A2204Z RESTORATION OF CARDIAC RHYTHM, SINGLE: ICD-10-PCS | Performed by: INTERNAL MEDICINE

## 2022-04-06 PROCEDURE — 74011250637 HC RX REV CODE- 250/637: Performed by: INTERNAL MEDICINE

## 2022-04-06 PROCEDURE — 99024 POSTOP FOLLOW-UP VISIT: CPT | Performed by: PHYSICIAN ASSISTANT

## 2022-04-06 PROCEDURE — 74011000250 HC RX REV CODE- 250: Performed by: PHYSICIAN ASSISTANT

## 2022-04-06 PROCEDURE — 93005 ELECTROCARDIOGRAM TRACING: CPT

## 2022-04-06 PROCEDURE — 99100 ANES PT EXTEME AGE<1 YR&>70: CPT | Performed by: ANESTHESIOLOGY

## 2022-04-06 PROCEDURE — 00410 ANES INTEG SYS CONV ARRHYT: CPT | Performed by: ANESTHESIOLOGY

## 2022-04-06 PROCEDURE — 2709999900 HC NON-CHARGEABLE SUPPLY

## 2022-04-06 PROCEDURE — 74011250636 HC RX REV CODE- 250/636: Performed by: NURSE ANESTHETIST, CERTIFIED REGISTERED

## 2022-04-06 PROCEDURE — 76060000031 HC ANESTHESIA FIRST 0.5 HR: Performed by: INTERNAL MEDICINE

## 2022-04-06 PROCEDURE — 74011000258 HC RX REV CODE- 258: Performed by: NURSE ANESTHETIST, CERTIFIED REGISTERED

## 2022-04-06 PROCEDURE — 74011000250 HC RX REV CODE- 250: Performed by: NURSE ANESTHETIST, CERTIFIED REGISTERED

## 2022-04-06 PROCEDURE — 74011250637 HC RX REV CODE- 250/637: Performed by: PHYSICIAN ASSISTANT

## 2022-04-06 PROCEDURE — 92960 CARDIOVERSION ELECTRIC EXT: CPT

## 2022-04-06 RX ORDER — HYDRALAZINE HYDROCHLORIDE 25 MG/1
25 TABLET, FILM COATED ORAL 3 TIMES DAILY
Status: DISCONTINUED | OUTPATIENT
Start: 2022-04-06 | End: 2022-04-06 | Stop reason: HOSPADM

## 2022-04-06 RX ORDER — SODIUM CHLORIDE 9 MG/ML
INJECTION, SOLUTION INTRAVENOUS
Status: DISCONTINUED | OUTPATIENT
Start: 2022-04-06 | End: 2022-04-06 | Stop reason: HOSPADM

## 2022-04-06 RX ORDER — HYDRALAZINE HYDROCHLORIDE 25 MG/1
25 TABLET, FILM COATED ORAL 3 TIMES DAILY
Qty: 90 TABLET | Refills: 0 | Status: SHIPPED | OUTPATIENT
Start: 2022-04-06 | End: 2022-08-22 | Stop reason: SDUPTHER

## 2022-04-06 RX ORDER — LIDOCAINE HYDROCHLORIDE 20 MG/ML
INJECTION, SOLUTION EPIDURAL; INFILTRATION; INTRACAUDAL; PERINEURAL AS NEEDED
Status: DISCONTINUED | OUTPATIENT
Start: 2022-04-06 | End: 2022-04-06 | Stop reason: HOSPADM

## 2022-04-06 RX ORDER — PROPOFOL 10 MG/ML
INJECTION, EMULSION INTRAVENOUS AS NEEDED
Status: DISCONTINUED | OUTPATIENT
Start: 2022-04-06 | End: 2022-04-06 | Stop reason: HOSPADM

## 2022-04-06 RX ORDER — SOTALOL HYDROCHLORIDE 80 MG/1
80 TABLET ORAL 2 TIMES DAILY
Qty: 60 TABLET | Refills: 2 | Status: SHIPPED | OUTPATIENT
Start: 2022-04-06

## 2022-04-06 RX ADMIN — SODIUM CHLORIDE, PRESERVATIVE FREE 10 ML: 5 INJECTION INTRAVENOUS at 05:57

## 2022-04-06 RX ADMIN — SOTALOL HYDROCHLORIDE 80 MG: 80 TABLET ORAL at 05:55

## 2022-04-06 RX ADMIN — EZETIMIBE 10 MG: 10 TABLET ORAL at 08:50

## 2022-04-06 RX ADMIN — LIDOCAINE HYDROCHLORIDE 50 MG: 20 INJECTION, SOLUTION EPIDURAL; INFILTRATION; INTRACAUDAL; PERINEURAL at 10:55

## 2022-04-06 RX ADMIN — HYDRALAZINE HYDROCHLORIDE 25 MG: 25 TABLET, FILM COATED ORAL at 12:48

## 2022-04-06 RX ADMIN — SODIUM CHLORIDE: 900 INJECTION, SOLUTION INTRAVENOUS at 10:48

## 2022-04-06 RX ADMIN — SODIUM CHLORIDE, PRESERVATIVE FREE 10 ML: 5 INJECTION INTRAVENOUS at 05:56

## 2022-04-06 RX ADMIN — HYDRALAZINE HYDROCHLORIDE 25 MG: 25 TABLET, FILM COATED ORAL at 15:58

## 2022-04-06 RX ADMIN — DOXAZOSIN 8 MG: 4 TABLET ORAL at 08:50

## 2022-04-06 RX ADMIN — AMLODIPINE BESYLATE 10 MG: 10 TABLET ORAL at 08:50

## 2022-04-06 RX ADMIN — APIXABAN 5 MG: 5 TABLET, FILM COATED ORAL at 08:50

## 2022-04-06 RX ADMIN — PROPOFOL 100 MG: 10 INJECTION, EMULSION INTRAVENOUS at 10:55

## 2022-04-06 NOTE — ROUTINE PROCESS
TRANSFER - OUT REPORT:    Verbal report given to LION Gooden(name) on Emeka Diane  being transferred to 2301(unit) for routine progression of care       Report consisted of patients Situation, Background, Assessment and   Recommendations(SBAR). Information from the following report(s) SBAR was reviewed with the receiving nurse. Lines:   Peripheral IV 04/04/22 Anterior; Left Forearm (Active)   Site Assessment Clean, dry, & intact 04/06/22 0808   Phlebitis Assessment 0 04/06/22 0808   Infiltration Assessment 0 04/06/22 0808   Dressing Status Clean, dry, & intact 04/06/22 0808   Dressing Type Transparent 04/06/22 0808   Hub Color/Line Status Patent 04/06/22 0000   Action Taken Open ports on tubing capped 04/06/22 0000   Alcohol Cap Used Yes 04/06/22 9409        Opportunity for questions and clarification was provided.       Patient transported with:   Registered Nurse:as, RN

## 2022-04-06 NOTE — PROGRESS NOTES
3405: TRANSFER - IN REPORT:    Verbal report received from Richelle Bonilla RN(name) on [employer]  being received from [facility](unit) for ordered procedure      Report consisted of patients Situation, Background, Assessment and   Recommendations(SBAR). Information from the following report(s) SBAR, Kardex, Intake/Output, MAR, Recent Results and Cardiac Rhythm Sinus Domi Cool was reviewed with the receiving nurse. Opportunity for questions and clarification was provided. Assessment completed upon patients arrival to unit and care assumed.

## 2022-04-06 NOTE — PROGRESS NOTES
D/C order noted for today. Orders reviewed. No needs identified at this time.          ROCIO MinaN RN  Care Management  Pager: [phone]

## 2022-04-06 NOTE — DISCHARGE SUMMARY
Cardiology Discharge Summary      Discharge Summary     Patient: Torin Ken MRN: [id number]  SSN: [id number]    YOB: 1950  Age: 66 y.o.   Sex: female       Admit Date: 4/4/2022    Discharge Date: 4/6/2022      Admission Diagnoses: [facility]) [I48.91]    Discharge Diagnoses:   Problem List as of 4/6/2022 Date Reviewed: 4/6/2022          Codes Class Noted - Resolved    [facility]) ICD-10-CM: I48.91  ICD-9-CM: 427.31  4/4/2022 - Present        Acute on chronic diastolic congestive heart [facility] ([facility] ICD-10-CM: I50.33  ICD-9-CM: 428.33, 428.0  6/21/2021 - Present        Coronary artery disease of bypass graft of native heart with stable angina pectoris ([facility] ICD-10-CM: I25.708  ICD-9-CM: 414.05, 413.9  3/19/2021 - Present        Tobacco abuse counseling ICD-10-CM: Z71.6  ICD-9-CM: V65.42, 305.1  2/8/2019 - Present        Paroxysmal atrial fibrillation ([facility] ICD-10-CM: I48.0  ICD-9-CM: 427.31  4/6/2018 - Present    Overview Signed 4/6/2018  1:17 PM by Shar Hernandez NP     AFib / Flutter seen on interrogation of ICD  Started Eliquis 5 mg / BID             Preop cardiovascular exam ICD-10-CM: Z01.810  ICD-9-CM: V72.81  10/2/2017 - Present    Overview Addendum 10/5/2017 12:37 PM by Patrick Stuart MD     Vascular surgery  Cleared from cardiac view with the understanding that patient will have mild risk for this surgery,              AICD at end of battery life ICD-10-CM: Z45.02  ICD-9-CM: V53.32  3/15/2017 - Present    Overview Signed 3/15/2017  8:52 AM by Nate Benjamin MD     [employer] aicd changeout 3/15/17             ICD (implantable cardioverter-defibrillator) in place-DDD ICD ICD-10-CM: Z95.810  ICD-9-CM: V45.02  8/26/2016 - Present    Overview Addendum 2/8/2019 11:19 AM by Patrick Stuart MD     gen change 11/09; 3/17  CARE LINK 10/10; 8/12; 4/13; 8/13; 2/16; 5/16; 8/16  nl function, nl volume; 2/17 nl function; DONNY; 8/17; 1/19 nl function, nl volume  OFFICE CHECK : 8/14 nl function, +AHR rare(changed detection to over 150/mt); 11/15; 11/16; 3/17 nl function, nl vol; 4/18 nl function, frequent AHR(likely AFlutter), nl volume             Coronary atherosclerosis of native coronary artery ICD-10-CM: I25.10  ICD-9-CM: 414.01  Unknown - Present    Overview Addendum 6/16/2020  9:14 AM by Sonia Gil MD     6/20 for card cath due to past abnormal stress test; preop for vascular surgery  10/17; 9/17; 3/17; 8/16 stable Symptoms; CABG 10/02 with LIMA to LAD and SVG to RCA             Postsurgical aortocoronary bypass status ICD-10-CM: Z95.1  ICD-9-CM: V45.81  Unknown - Present    Overview Signed 3/24/2017 10:02 AM by Sonia Gil MD     3/17 stable symptoms             Unspecified arthropathy, lower leg ICD-10-CM: M17.10  ICD-9-CM: 716.96  Unknown - Present    Overview Signed 4/3/2013  5:36 PM by Deisy Berger     S/P Left knee replacement. Had repeat surgery's for her left knee. Essential hypertension, benign ICD-10-CM: I10  ICD-9-CM: 401.1  Unknown - Present    Overview Addendum 10/5/2017 12:39 PM by Sonia Gil MD     controlled but high before meds today             Hyperlipemia ICD-10-CM: E78.5  ICD-9-CM: 272.4  Unknown - Present    Overview Addendum 10/2/2017 10:37 AM by Sonia Gil MD     5/17 LDL69; 2/16 MBJ48; 10/15, 9/14 Low density lipoproteins are at goal, triglycerides are at goal, high density lipoproteins are at goal.             S/P TKR (total knee replacement)[employer]ison ICD-10-CM: C80.617  ICD-9-CM: V43.65  Unknown - Present    Overview Signed 10/7/2010 10:58 AM by Margaret Moncada     She initially had her knee replacement done elsewhere and ended up with a very severe fixed-flexion deformity of approximately 45-50 degrees. We did revise her knee. We were able to get her knee almost perfectly straight after surgery. Discharge Condition: Good    Hospital Course:  Yasmany Lynch is a 66 y.o. female, PMHx HTN and AFib, who presented for elective outpatient Sotalol loading and electrical cardioversion. Patient successfully converted to SR s/p electrical cardioversion using ICD. Will d/c BB indefinitely. Will continue Sotalol at discharge. Plan for patient to follow up with Dr. Perfecto Verdugo in one week. Consults: None    Significant Diagnostic Studies:     Disposition: home    Discharge Medications:      My Medications      START taking these medications      Instructions Each Dose to Equal Morning Noon Evening Bedtime   hydrALAZINE 25 mg tablet  Commonly known as: APRESOLINE    Your last dose was: Your next dose is: Take 1 Tablet by mouth three (3) times daily. Indications: high blood pressure   25 mg                 sotaloL 80 mg tablet  Commonly known as: BETAPACE    Your last dose was: Your next dose is: Take 1 Tablet by mouth two (2) times a day. 80 mg                    CHANGE how you take these medications      Instructions Each Dose to Equal Morning Noon Evening Bedtime   ezetimibe 10 mg tablet  Commonly known as: Cynthia Tucker  What changed: how much to take    Your last dose was: Your next dose is: Take 1 Tablet by mouth daily. 10 mg                    CONTINUE taking these medications      Instructions Each Dose to Equal Morning Noon Evening Bedtime   acetaminophen 500 mg tablet  Commonly known as: TYLENOL    Your last dose was: Your next dose is: Take  by mouth every six (6) hours as needed for Pain. albuterol 90 mcg/actuation inhaler  Commonly known as: PROVENTIL HFA, VENTOLIN HFA, PROAIR HFA    Your last dose was: Your next dose is: Take 2 Puffs by inhalation. 2 Puff                 amLODIPine 10 mg tablet  Commonly known as: NORVASC    Your last dose was: Your next dose is: Take 1 Tablet by mouth daily. 10 mg                 apixaban 5 mg tablet  Commonly known as: Eliquis    Your last dose was:      Your next dose is:         take 1 tablet by mouth twice a day                  cyanocobalamin 1,000 mcg sublingual tablet  Commonly known as: VITAMIN B-12    Your last dose was: Your next dose is: Take 1,000 mcg by mouth. Every other friday   1,000 mcg                 doxazosin 8 mg tablet  Commonly known as: CARDURA    Your last dose was: Your next dose is: Take 1 Tablet by mouth two (2) times a day. 8 mg                 furosemide 20 mg tablet  Commonly known as: LASIX    Your last dose was: Your next dose is:         take 1 tablet by mouth once daily if needed for edema                  multivitamin tablet  Commonly known as: ONE A DAY    Your last dose was: Your next dose is: Take 1 Tab by mouth daily. 1 Tablet                 rosuvastatin 40 mg tablet  Commonly known as: Crestor    Your last dose was: Your next dose is: Take 1 Tablet by mouth nightly. 40 mg                    STOP taking these medications    metoprolol tartrate 100 mg IR tablet  Commonly known as: LOPRESSOR              Where to Get Your Medications      These medications were sent to [facility], [address] [place] [address], [address] [facility], [address] [address]    Phone: [phone]   · apixaban 5 mg tablet  · doxazosin 8 mg tablet  · ezetimibe 10 mg tablet  · rosuvastatin 40 mg tablet     These medications were sent to [facility], [address] [facility], [address] [facility]    Phone: [phone]   · hydrALAZINE 25 mg tablet  · sotaloL 80 mg tablet         Activity: Activity as tolerated  Diet: Regular Diet  Wound Care: None needed    No orders of the defined types were placed in this encounter.       Signed By: Junior Alexander PA-C     April 6, 2022

## 2022-04-06 NOTE — PROGRESS NOTES
Cardiology Progress Note    Admit Date: 4/4/2022  Attending Cardiologist: Dr. Bryson Reasons:     Hospital Problems  Date Reviewed: 4/1/2022          Codes Class Noted POA    A-fib [facility]) ICD-10-CM: I48.91  ICD-9-CM: 427.31  4/4/2022 Unknown            -Sotalol loading with elective electrical cardioversion scheduled for 4/6/22. -AFib. Rates well controlled. On Eliquis and Lopressor as outpatient.   -Chronic diastolic dysfunction. Volume status stable. On PRN lasix.   -HTN. Elevated, on amlodipine as outpatient. -s/p ICD   -HLD, on statin.   -Tobacco Abuse, cessation advised.         Primary Cardiologist: Dr. Ayden Tam. Plan:       I saw, evaluated, interviewed and examined the patient personally. Patient without ungina or heart failure. Hemodynamically stable. A.fib on exam. No obvious fluid overload or rales  Labs stable  Still a.fib  Will proceed with Cardioversion without MIKHAIL. Patinet on Apixaban for 2 years. DW patient about rate control strategy vs rhythm control strategy. Risk , benefit and alternatives and complication of both discussed  Complication assocaited cardioversion discussed in detail including but not limited to emergent thoracic surgery, pacemaker need, cardiac arrest and stroke. .. Patient agreeable forcardioversion. Significant time spent in reviewing the case, multiple EMR databases, physician notes, reviewing pertinent labs and imaging studies  I spent significant amount of time for medical decision making and updated history, and other providers assessments as well. I personally agree with the findings as stated and the plan as documented. I saw, examined, and evaluated this patient and performed the substantive portion of the encounter for > 50% of the time including extensive history, physical exam, assessment and plan    Willis Estrada MD       Remains in AFib with sotalol loading.  QTc stable on ECG from this am.   Will proceed with electrical cardioversion this am.   Reports taking Eliquis as prescribed for ~ 2 years. Further recommendations pending. Subjective:     No new complaints.  NPO for cardioversion this am.     Objective:      Patient Vitals for the past 8 hrs:   Temp Pulse Resp BP SpO2   04/06/22 0759 98.1 °F (36.7 °C) 60 18 (!) 152/79 96 %   04/06/22 0350 97.4 °F (36.3 °C) (!) 52 16 (!) 149/69 99 %         Patient Vitals for the past 96 hrs:   Weight   04/06/22 0350 81.1 kg (178 lb 11.2 oz)   04/05/22 0400 79.6 kg (175 lb 8 oz)       TELE: AF               Current Facility-Administered Medications   Medication Dose Route Frequency Last Admin    hydrALAZINE (APRESOLINE) 20 mg/mL injection 20 mg  20 mg IntraVENous Q6H PRN      sotaloL (BETAPACE) tablet 80 mg  80 mg Oral BID 80 mg at 04/06/22 0555    acetaminophen (TYLENOL) tablet 650 mg  650 mg Oral Q6H PRN      amLODIPine (NORVASC) tablet 10 mg  10 mg Oral DAILY 10 mg at 04/05/22 6092    apixaban (ELIQUIS) tablet 5 mg  5 mg Oral BID 5 mg at 04/05/22 2110    doxazosin (CARDURA) tablet 8 mg  8 mg Oral DAILY 8 mg at 04/05/22 2142    ezetimibe (ZETIA) tablet 10 mg  10 mg Oral DAILY 10 mg at 04/05/22 7963    furosemide (LASIX) tablet 20 mg  20 mg Oral DAILY PRN      rosuvastatin (CRESTOR) tablet 40 mg  40 mg Oral QHS 40 mg at 04/05/22 2110    sodium chloride (NS) flush 5-40 mL  5-40 mL IntraVENous Q8H 10 mL at 04/06/22 0556    sodium chloride (NS) flush 5-40 mL  5-40 mL IntraVENous PRN      sodium chloride (NS) flush 5-40 mL  5-40 mL IntraVENous Q8H 10 mL at 04/06/22 0557    sodium chloride (NS) flush 5-40 mL  5-40 mL IntraVENous PRN      acetaminophen (TYLENOL) tablet 650 mg  650 mg Oral Q6H PRN      Or    acetaminophen (TYLENOL) suppository 650 mg  650 mg Rectal Q6H PRN      polyethylene glycol (MIRALAX) packet 17 g  17 g Oral DAILY PRN      ECG Reminder Note  1 Each Other Q12H 1 Each at 04/06/22 0000    ELECTROLYTE REPLACEMENT PROTOCOL - Potassium   1 Each Other PRN      ELECTROLYTE REPLACEMENT PROTOCOL - Magnesium  1 Each Other PRN           Intake/Output Summary (Last 24 hours) at 4/6/2022 0831  Last data filed at 4/5/2022 1823  Gross per 24 hour   Intake 240 ml   Output --   Net 240 ml       Physical Exam:  General:  alert, cooperative, no distress, appears stated age  Neck:  no JVD  Lungs:  clear to auscultation bilaterally  Heart:  irregularly irregular rhythm  Abdomen:  abdomen is soft without significant tenderness, masses, organomegaly or guarding  Extremities:  extremities normal, atraumatic, no cyanosis or edema    Visit Vitals  BP (!) 152/79   Pulse 60   Temp 98.1 °F (36.7 °C)   Resp 18   Wt 81.1 kg (178 lb 11.2 oz)   SpO2 96%   BMI 25.64 kg/m²       Data Review:     Labs: Results:       Chemistry Recent Labs     04/05/22 0717 04/04/22 2020   GLU 80 112*    145   K 3.3* 3.5   * 111   CO2 26 28   BUN 11 14   CREA 0.94 1.05   CA 8.8 8.7   MG 2.0 2.1   AGAP 5 6   BUCR 12 13      CBC w/Diff No results for input(s): WBC, RBC, HGB, HCT, PLT, GRANS, LYMPH, EOS, HGBEXT, HCTEXT, PLTEXT in the last 72 hours. Cardiac Enzymes No results found for: CPK, CK, CKMMB, CKMB, RCK3, CKMBT, CKNDX, CKND1, MARIANN, TROPT, TROIQ, PAUL, TROPT, TNIPOC, BNP, BNPP   Coagulation No results for input(s): PTP, INR, APTT, INREXT in the last 72 hours. Lipid Panel Lab Results   Component Value Date/Time    Cholesterol, total 125 07/07/2021 12:00 AM    HDL Cholesterol 49 07/07/2021 12:00 AM    LDL, calculated 63 07/07/2021 12:00 AM    LDL, calculated 69 05/24/2017 09:20 AM    VLDL, calculated 13 07/07/2021 12:00 AM    VLDL, calculated 15 05/24/2017 09:20 AM    Triglyceride 62 07/07/2021 12:00 AM      BNP No results found for: BNP, BNPP, XBNPT   Liver Enzymes No results for input(s): TP, ALB, TBIL, AP in the last 72 hours.     No lab exists for component: SGOT, GPT, DBIL   Thyroid Studies No results found for: T4, T3U, TSH, TSHEXT       Signed By: Junior Alexander PA-C     April 6, 2022

## 2022-04-06 NOTE — PROGRESS NOTES
Problem: Patient Education: Go to Patient Education Activity  Goal: Patient/Family Education  Outcome: Progressing Towards Goal     Problem: Pain  Goal: *Control of Pain  Outcome: Progressing Towards Goal     Problem: Patient Education: Go to Patient Education Activity  Goal: Patient/Family Education  Outcome: Progressing Towards Goal     Problem: Falls - Risk of  Goal: *Absence of Falls  Description: Document Komal Locket Fall Risk and appropriate interventions in the flowsheet.   Note: Fall Risk Interventions:  Mobility Interventions: Patient to call before getting OOB         Medication Interventions: Evaluate medications/consider consulting pharmacy    Elimination Interventions: Call light in reach

## 2022-04-06 NOTE — ANESTHESIA PREPROCEDURE EVALUATION
Relevant Problems   CARDIOVASCULAR   (+) A-fib (HCC)   (+) Acute on chronic diastolic congestive heart failure (HCC)   (+) Coronary artery disease of bypass graft of native heart with stable angina pectoris (HCC)   (+) Coronary atherosclerosis of native coronary artery   (+) Essential hypertension, benign   (+) Paroxysmal atrial fibrillation (HCC)   (+) Postsurgical aortocoronary bypass status       Anesthetic History   No history of anesthetic complications            Review of Systems / Medical History  Patient summary reviewed and pertinent labs reviewed    Pulmonary                   Neuro/Psych         Psychiatric history     Cardiovascular    Hypertension        Dysrhythmias : atrial fibrillation  Pacemaker and CAD         GI/Hepatic/Renal                Endo/Other        Arthritis     Other Findings              Physical Exam    Airway  Mallampati: II  TM Distance: 4 - 6 cm  Neck ROM: normal range of motion   Mouth opening: Normal     Cardiovascular    Rhythm: irregular  Rate: abnormal         Dental    Dentition: Poor dentition     Pulmonary      Decreased breath sounds: bilateral           Abdominal  GI exam deferred       Other Findings            Anesthetic Plan    ASA: 4  Anesthesia type: MAC            Anesthetic plan and risks discussed with: Patient

## 2022-04-06 NOTE — ANESTHESIA POSTPROCEDURE EVALUATION
Procedure(s):  EP CARDIOVERSION. MAC    Anesthesia Post Evaluation      Multimodal analgesia: multimodal analgesia used between 6 hours prior to anesthesia start to PACU discharge  Patient location during evaluation: bedside  Patient participation: complete - patient participated  Level of consciousness: awake  Pain management: adequate  Airway patency: patent  Anesthetic complications: no  Cardiovascular status: stable  Respiratory status: acceptable  Hydration status: acceptable  Post anesthesia nausea and vomiting:  controlled      INITIAL Post-op Vital signs: No vitals data found for the desired time range.

## 2022-04-06 NOTE — PROGRESS NOTES
Problem: Falls - Risk of  Goal: *Absence of Falls  Description: Document Stu Fang Fall Risk and appropriate interventions in the flowsheet.   Outcome: Progressing Towards Goal  Note: Fall Risk Interventions:  Mobility Interventions: Patient to call before getting OOB         Medication Interventions: Evaluate medications/consider consulting pharmacy    Elimination Interventions: Call light in reach

## 2022-04-07 LAB
ATRIAL RATE: 50 BPM
CALCULATED R AXIS, ECG10: 58 DEGREES
CALCULATED T AXIS, ECG11: -30 DEGREES
DIAGNOSIS, 93000: NORMAL
P-R INTERVAL, ECG05: 180 MS
Q-T INTERVAL, ECG07: 512 MS
QRS DURATION, ECG06: 82 MS
QTC CALCULATION (BEZET), ECG08: 466 MS
VENTRICULAR RATE, ECG03: 50 BPM

## 2022-04-08 ENCOUNTER — OFFICE VISIT (OUTPATIENT)
Dept: CARDIOLOGY CLINIC | Age: 72
End: 2022-04-08
Payer: MEDICARE

## 2022-04-08 VITALS
BODY MASS INDEX: 24.2 KG/M2 | HEART RATE: 55 BPM | DIASTOLIC BLOOD PRESSURE: 50 MMHG | OXYGEN SATURATION: 100 % | SYSTOLIC BLOOD PRESSURE: 116 MMHG | WEIGHT: 169 LBS | HEIGHT: 70 IN

## 2022-04-08 DIAGNOSIS — I50.32 CHRONIC DIASTOLIC CONGESTIVE HEART FAILURE (HCC): ICD-10-CM

## 2022-04-08 DIAGNOSIS — I48.0 PAROXYSMAL ATRIAL FIBRILLATION (HCC): Primary | ICD-10-CM

## 2022-04-08 DIAGNOSIS — I10 ESSENTIAL HYPERTENSION, BENIGN: ICD-10-CM

## 2022-04-08 DIAGNOSIS — I25.10 ATHEROSCLEROSIS OF NATIVE CORONARY ARTERY OF NATIVE HEART WITHOUT ANGINA PECTORIS: ICD-10-CM

## 2022-04-08 DIAGNOSIS — Z71.6 TOBACCO ABUSE COUNSELING: ICD-10-CM

## 2022-04-08 LAB — ABO + RH BLD: NORMAL

## 2022-04-08 PROCEDURE — 1111F DSCHRG MED/CURRENT MED MERGE: CPT | Performed by: INTERNAL MEDICINE

## 2022-04-08 PROCEDURE — G8427 DOCREV CUR MEDS BY ELIG CLIN: HCPCS | Performed by: INTERNAL MEDICINE

## 2022-04-08 PROCEDURE — G8432 DEP SCR NOT DOC, RNG: HCPCS | Performed by: INTERNAL MEDICINE

## 2022-04-08 PROCEDURE — G8752 SYS BP LESS 140: HCPCS | Performed by: INTERNAL MEDICINE

## 2022-04-08 PROCEDURE — 3017F COLORECTAL CA SCREEN DOC REV: CPT | Performed by: INTERNAL MEDICINE

## 2022-04-08 PROCEDURE — G8536 NO DOC ELDER MAL SCRN: HCPCS | Performed by: INTERNAL MEDICINE

## 2022-04-08 PROCEDURE — G8400 PT W/DXA NO RESULTS DOC: HCPCS | Performed by: INTERNAL MEDICINE

## 2022-04-08 PROCEDURE — 1101F PT FALLS ASSESS-DOCD LE1/YR: CPT | Performed by: INTERNAL MEDICINE

## 2022-04-08 PROCEDURE — 99213 OFFICE O/P EST LOW 20 MIN: CPT | Performed by: INTERNAL MEDICINE

## 2022-04-08 PROCEDURE — G8420 CALC BMI NORM PARAMETERS: HCPCS | Performed by: INTERNAL MEDICINE

## 2022-04-08 PROCEDURE — G8754 DIAS BP LESS 90: HCPCS | Performed by: INTERNAL MEDICINE

## 2022-04-08 PROCEDURE — 93000 ELECTROCARDIOGRAM COMPLETE: CPT | Performed by: INTERNAL MEDICINE

## 2022-04-08 PROCEDURE — 1090F PRES/ABSN URINE INCON ASSESS: CPT | Performed by: INTERNAL MEDICINE

## 2022-04-08 NOTE — PROGRESS NOTES
HISTORY OF PRESENT ILLNESS  Holden Montoya is a 66 y.o. female. Follow-up of CAD, status post CABG, hypertension, paroxysmal A. fib, status post ICD, hyperlipidemia. 6/20 seen for abnormal stress test which was done in [facility] in March 2024 preop testing for vascular surgery and was found to be abnormal.  She was recently seen 3 weeks ago when she did not mention anything about stress test being done in an outside hospital.    3/2021 patient presents to follow-up for hypertension and lab results  4/22 seen after cardioversion. Staying in sinus rhythm with demand atrial pacing. Feeling well without any new symptoms. Follow-up  Associated symptoms include shortness of breath. Pertinent negatives include no chest pain and no headaches. Palpitations   The history is provided by the medical records (PAF on ICD interrogation). Associated symptoms include lower extremity edema and shortness of breath. Pertinent negatives include no fever, no malaise/fatigue, no chest pain, no claudication, no orthopnea, no PND, no nausea, no vomiting, no headaches, no dizziness and no cough. Her past medical history is significant for hypertension. Review of Systems   Constitutional: Negative for chills, fever, malaise/fatigue and weight loss. HENT: Negative for nosebleeds. Eyes: Negative for discharge. Respiratory: Positive for shortness of breath. Negative for cough and wheezing. Cardiovascular: Positive for palpitations and leg swelling. Negative for chest pain, orthopnea, claudication and PND. Gastrointestinal: Negative for diarrhea, nausea and vomiting. Genitourinary: Negative for dysuria and hematuria. Musculoskeletal: Negative for joint pain. Skin: Negative for rash. Neurological: Negative for dizziness, seizures, loss of consciousness and headaches. Endo/Heme/Allergies: Negative for polydipsia. Does not bruise/bleed easily.    Psychiatric/Behavioral: Negative for depression and substance abuse. The patient does not have insomnia. Allergies   Allergen Reactions    Losartan Angioedema     Tongue swelling but not on lips       Past Medical History:   Diagnosis Date    Arthritis     Automatic implantable cardiac defibrillator in situ     Coronary atherosclerosis of native coronary artery     CABG 10/02 with LIMA to LAD and SVG to RCA    Depression     Essential hypertension     Headache(784.0)     Other and unspecified hyperlipidemia     9/12 Low density lipoproteins are at goal, triglycerides are at goal, high density lipoproteins are at goal.    Postsurgical aortocoronary bypass status 2002    Presence of permanent cardiac pacemaker-DDD 11/6/2015    OFFICE CHECK : 8/14 nl function, +AHR rare(changed detection to over 150/mt); 11/15 nl function     Unspecified arthropathy, lower leg     S/P Left knee replacement. Had repeat surgery's for her left knee.  UTI (urinary tract infection)        Family History   Problem Relation Age of Onset    OSTEOARTHRITIS Other         not specified    Hypertension Other         not specified    Sudden Death Neg Hx     Heart Attack Neg Hx        Social History     Tobacco Use    Smoking status: Current Every Day Smoker     Packs/day: 0.05     Years: 6.00     Pack years: 0.30     Types: Cigarettes     Start date: 2015    Smokeless tobacco: Never Used    Tobacco comment: pt states she do not smoke every day   Substance Use Topics    Alcohol use: No    Drug use: Never        Current Outpatient Medications   Medication Sig    hydrALAZINE (APRESOLINE) 25 mg tablet Take 1 Tablet by mouth three (3) times daily. Indications: high blood pressure    sotaloL (BETAPACE) 80 mg tablet Take 1 Tablet by mouth two (2) times a day.  doxazosin (CARDURA) 8 mg tablet Take 1 Tablet by mouth two (2) times a day.  apixaban (Eliquis) 5 mg tablet take 1 tablet by mouth twice a day    ezetimibe (ZETIA) 10 mg tablet Take 1 Tablet by mouth daily.     rosuvastatin (Crestor) 40 mg tablet Take 1 Tablet by mouth nightly.  amLODIPine (NORVASC) 10 mg tablet Take 1 Tablet by mouth daily.  albuterol (PROVENTIL HFA, VENTOLIN HFA, PROAIR HFA) 90 mcg/actuation inhaler Take 2 Puffs by inhalation as needed.  furosemide (LASIX) 20 mg tablet take 1 tablet by mouth once daily if needed for edema    multivitamin (ONE A DAY) tablet Take 1 Tab by mouth daily.  cyanocobalamin (VITAMIN B-12) 1,000 mcg sublingual tablet Take 1,000 mcg by mouth. Every other friday    acetaminophen (TYLENOL) 500 mg tablet Take  by mouth every six (6) hours as needed for Pain. No current facility-administered medications for this visit. Past Surgical History:   Procedure Laterality Date    HX CORONARY ARTERY BYPASS GRAFT  10/2002    HX ORTHOPAEDIC  214672    Reduced two component revision using the Sigma System. Lateral release. Suture anchor repair of medial collateral ligament. [facility] ORTHOPAEDIC  [id number]    Left knee arthrofibrosis, status post significant scarring, status post left total knee replacement    [place] CARDIAC SURG PROCEDURE UNLIST  11/09    gen change-obici    VASCULAR SURGERY PROCEDURE UNLIST  2005    Aorto biffem.   Dr. Claudette Mayhew       Diagnostic Studies:  CARDIOLOGY STUDIES 10/5/2017   Pharmacological Nuclear Stress Test Result mild small infbasal ischemia, 73%EF   Some recent data might be hidden     3/20 nuclear stress test  MARIANN REST/STRESS MULTIPLE [facility]  Component Name Value Ref Range   EF Nuc 52     Result Impression    THIS MYOCARDIAL PERFUSION STUDY IS ABNORMAL   THIS IS A HIGH RISK STUDY   ABNORMAL NUCLEAR STUDY SUGGESTIVE OF TWO-VESSEL DISEASE     ON INITIAL STRESS IMAGES THERE IS A MODERATE TO LARGE AREA OF DIMINISHED UPTAKE IN THE ISOTOPE   THROUGHOUT THE ANTERIOR SEPTAL AND THE LATERAL WALL     ON THE REST IMAGES THERE IS ENHANCED UPTAKE THROUGHOUT THE ANTERIOR SEPTAL AND LATERAL WALL     THE GATED ACQUISITION SHOWS LATERAL WALL HYPOKINESIS AND AN EJECTION FRACTION ESTIMATED AT   50%     THE ABOVE FINDINGS ARE CONSISTENT WITH TWO-VESSEL ISCHEMIA INVOLVING THE PROXIMAL LAD AND   CIRCUMFLEX ARTERY   8/21 echo  Interpretation Summary    · LV: Estimated LVEF is 40 - 45%. Normal cavity size. Moderate concentric hypertrophy. Mild hypokinesis of the mid inferior and mid inferoseptal wall(s). Severe hypokinesis of the basal inferior and basal inferoseptal wall(s). Severe (grade 3) left ventricular diastolic dysfunction. · LA: Moderately dilated left atrium. Left Atrium volume index is 48.02 mL/m2. · RV: Pacer/ICD present. · RA: Mildly dilated right atrium. · MV: Mitral valve thickening. Mitral annular calcification. · TV: Mild tricuspid valve regurgitation is present. Right Ventricular Arterial Pressure (RVSP) is 35 mmHg. Pulmonary hypertension not suggested by Doppler findings. · PV: Mild pulmonic valve regurgitation is present. Comparison Study Information    Prior Study    When compared to the previous study from 1/27/09, the overall left ventricular ejection fraction now appears mildly reduced, there is now moderate left ventricular hypertrophy, the wall motion abnormalities appear to be new, and there is now bi atrial enlargement. Visit Vitals  BP (!) 116/50 (BP 1 Location: Left upper arm, BP Patient Position: Sitting, BP Cuff Size: Adult)   Pulse (!) 55   Ht 5' 10\" (1.778 m)   Wt 76.7 kg (169 lb)   SpO2 100%   BMI 24.25 kg/m²       Ms. Nakul Juarez has a reminder for a \"due or due soon\" health maintenance. I have asked that she contact her primary care provider for follow-up on this health maintenance. Physical Exam  Vitals and nursing note reviewed. Constitutional:       General: She is not in acute distress. Appearance: She is well-developed. Comments: kyphosis   HENT:      Head: Normocephalic and atraumatic. Eyes:      General: No scleral icterus. Right eye: No discharge.          Left eye: No discharge. Neck:      Thyroid: No thyromegaly. Vascular: No carotid bruit or JVD. Cardiovascular:      Rate and Rhythm: Normal rate and regular rhythm. Pulses: Intact distal pulses. Heart sounds: Normal heart sounds, S1 normal and S2 normal. No murmur heard. No friction rub. No gallop. Pulmonary:      Effort: Pulmonary effort is normal.      Breath sounds: Normal breath sounds. No wheezing or rales. Abdominal:      Palpations: Abdomen is soft. There is no mass. Tenderness: There is no abdominal tenderness. Musculoskeletal:         General: Deformity: 1-2. Normal range of motion. Cervical back: Neck supple. Right lower leg: No edema. Left lower leg: No edema. Comments: Cant put legs straight   Skin:     General: Skin is warm and dry. Findings: No rash. Neurological:      Mental Status: She is alert and oriented to person, place, and time. Psychiatric:         Behavior: Behavior normal.     wounds healing well left subclavicular area     ASSESSMENT and PLAN      AFib / Flutter seen on interrogation of ICD  Started Eliquis 5 mg / BID  HLD: 5/17 LDL69; 2/16 LDL81;   Results for Guicho Arana (MRN [id number]) as of 9/27/2021 10:45   Ref.  Range 7/7/2021 00:00   Triglyceride Latest Ref Range: 0 - 149 mg/dL 62   Cholesterol, total Latest Ref Range: 100 - 199 mg/dL 125   HDL Cholesterol Latest Ref Range: >39 mg/dL 49   VLDL, calculated Latest Ref Range: 5 - 40 mg/dL 13   LDL, calculated Latest Ref Range: 0 - 99 mg/dL 63         BiV ICD: gen change 11/09; 3/17  CARE LINK 10/10; 8/12; 4/13; 8/13; 2/16; 5/16; 8/16  nl function, nl volume; 2/17 nl function; DONNY; 8/17; 1/19; 11/19 nl function, nl volume; 2/20 normal function, normal volume, PAF; 6/20 normal function, recent increase in volume; 3/21 normal function, high-volume, frequent PAF; 6/21 normal function, nearly persistent A. fib, intermittent high-volume;  OFFICE CHECK : 8/14 nl function, +AHR rare(changed detection to over 150/mt); 11/15; 11/16; 3/17 nl function, nl vol; 4/18 nl function, frequent AHR(likely AFlutter), nl volume; Normal function, normal volume, AT/AF episodes are far field sensing events;  4/22 normal function, normal volume, permanent AF for many months now. Delete that;      6/1/2020 blood pressure is elevated. Increase Cardura to 8 mg at bedtime. Patient cannot check her blood pressures at home even though she has a machine. Continue Eliquis due to frequent AT/A. fib on ICD interrogation. Follow-up [facility] every 3 months. Office check in 6 months  Stable CAD. Continue present medications. She has developed mild edema on the right leg but recently had a lot of stress and likely dietary indiscretion due to sister in the hospital and eventually dying in the hospital.  Patient trying to stop smoking and she was congratulated for that. Hopefully she will discontinue very soon. She has cut down the smoking significantly. Lasix helping well but she does not like to take it daily. 2/19/2021 CAD stable. Continue same medications  BP is elevated. Increase doxazosin to 8 mg twice daily. Follow home chart and visit with nurse practitioner 1 week approximately to check on the pressure. Check [facility] ASAP, preferably through today. Continue Eliquis for paroxysmal A. fib. Routine labs to check liver and lipids  Tobacco cessation discussed and reinforced again. She says she smokes 3 cigarettes a day and does not inhale. 3/25/2021  Blood pressure has improved with recent increase in doxazosin. Home chart reviewed blood pressure consistently 130s over 80s. CAD is stable with no symptoms presently, continue current medications. Recent LFT and lipids reviewed and discussed with patient lipids at goal.  She reports stop smoking cigarettes on March 19 and for this was congratulated     6/21/2021 has CHF exacerbation again like last year. Was in ER and improved with Lasix.   Still has edema and needs to continue. Check echo. Reduce amlodipine so she can have better diuresis. CAD stable. She is cutting down her tobacco and was congratulated. Hopefully she can just let it go now. Labs as ordered. 9/27/2021 CHF is compensated now NYHA class II. CAD stable without any significant angina. She has almost quit smoking and smokes 1 cigarette a week as she says. She was congratulated for that. Blood pressure is minimally elevated but she is not sure whether she takes doxazosin at home she will call back and then we can adjust the medications. ICD is functioning normally but needs to be interrogated again. Lipids are well controlled. Echo has shown mild reduction in LV function. Dilated biatrial sizes. Medical treatment to continue. 4/1/2022 CAD stable. CHF is worsening delete that CHF is stable. Blood pressure is elevated. Increase Norvasc and follow home chart. Patient seems to be in permanent A. fib for few months now. Discussed with her and options will be to start sotalol as inpatient or amiodarone as outpatient. My first choice will be sotalol. She will think about it and let us know early next week. Tobacco cessation reinforced. Addendum: While sitting in the office she change her mind and is ready to get admitted. We will admit her on Sunday with elective cardioversion Tuesday. Diagnoses and all orders for this visit:    1. Paroxysmal atrial fibrillation (HCC)  -     AMB POC EKG ROUTINE W/ 12 LEADS, INTER & REP  -     AMB POC EKG ROUTINE W/ 12 LEADS, INTER & REP; Future    2. Essential hypertension, benign    3. Atherosclerosis of native coronary artery of native heart without angina pectoris    4. Chronic diastolic congestive heart failure ([address]    5. Tobacco abuse counseling        Pertinent laboratory and test data reviewed and discussed with patient.   See patient instructions also for other medical advice given    There are no discontinued medications. Follow-up and Dispositions    · Return in about 3 months (around 7/8/2022) for with ekg, do a EKG next week Friday in [place] office and show me.       4/22 staying in sinus rhythm on sotalol. QT interval is increased but acceptable and will be followed closely. Next EKG in 1 week. Blood pressure is controlled well. CAD stable.

## 2022-04-08 NOTE — PATIENT INSTRUCTIONS
There are no discontinued medications. Learning About Benefits From Quitting Smoking  How does quitting smoking make you healthier? If you're thinking about quitting smoking, you may have a few reasons to be smoke-free. Your health may be one of them. · When you quit smoking, you lower your risks for cancer, lung disease, heart attack, stroke, blood vessel disease, and blindness from macular degeneration. · When you're smoke-free, you get sick less often, and you heal faster. You are less likely to get colds, flu, bronchitis, and pneumonia. · As a nonsmoker, you may find that your mood is better and you are less stressed. When and how will you feel healthier? Quitting has real health benefits that start from day 1 of being smoke-free. And the longer you stay smoke-free, the healthier you get and the better you feel. The first hours  · After just 20 minutes, your blood pressure and heart rate go down. That means there's less stress on your heart and blood vessels. · Within 12 hours, the level of carbon monoxide in your blood drops back to normal. That makes room for more oxygen. With more oxygen in your body, you may notice that you have more energy than when you smoked. After 2 weeks  · Your lungs start to work better. · Your risk of heart attack starts to drop. After 1 month  · When your lungs are clear, you cough less and breathe deeper, so it's easier to be active. · Your sense of taste and smell return. That means you can enjoy food more than you have since you started smoking. Over the years  · Over the years, your risks of heart disease, heart attack, and stroke are lower. · After 10 years, your risk of dying from lung cancer is cut by about half. And your risk for many other types of cancer is lower too. How would quitting help others in your life? When you quit smoking, you improve the health of everyone who now breathes in your smoke.   · Their heart, lung, and cancer risks drop, much like yours. · They are sick less. For babies and small children, living smoke-free means they're less likely to have ear infections, pneumonia, and bronchitis. · If you're a woman who is or will be pregnant someday, quitting smoking means a healthier . · Children who are close to you are less likely to become adult smokers. Where can you learn more? Go to http://www.gray.com/  Enter O319 in the search box to learn more about \"Learning About Benefits From Quitting Smoking. \"  Current as of: 2021               Content Version: 13.2  © 8764-3203 [employer], [employer]. Care instructions adapted under license by [employer] (which disclaims liability or warranty for this information). If you have questions about a medical condition or this instruction, always ask your healthcare professional. [address] any warranty or liability for your use of this information.

## 2022-04-08 NOTE — PROGRESS NOTES
1. Have you been to the ER, urgent care clinic since your last visit? Hospitalized since your last visit? Yes When: 4/2022 Where: [facility] Reason for visit: a-fib    2. Have you seen or consulted any other health care providers outside of the [address] [place] [address] since your last visit? Include any pap smears or colon screening. No    3. Since your last visit, have you had any of the following symptoms? No    4. Have you had any blood work, X-rays or cardiac testing? Yes When: 4/5/22 Where: [facility] Reason for visit: ER     Requested: NO     [facility]    5. Where do you normally have your labs drawn? [address] [place] [address]  6. Do you need any refills today?   No

## 2022-04-15 ENCOUNTER — CLINICAL SUPPORT (OUTPATIENT)
Dept: CARDIOLOGY CLINIC | Age: 72
End: 2022-04-15
Payer: MEDICARE

## 2022-04-15 DIAGNOSIS — I48.0 PAROXYSMAL ATRIAL FIBRILLATION (HCC): Primary | ICD-10-CM

## 2022-04-15 PROCEDURE — 93000 ELECTROCARDIOGRAM COMPLETE: CPT | Performed by: INTERNAL MEDICINE

## 2022-07-17 DIAGNOSIS — I10 ESSENTIAL HYPERTENSION, BENIGN: ICD-10-CM

## 2022-07-17 DIAGNOSIS — I25.10 ATHEROSCLEROSIS OF NATIVE CORONARY ARTERY OF NATIVE HEART WITHOUT ANGINA PECTORIS: ICD-10-CM

## 2022-07-17 DIAGNOSIS — I48.0 PAROXYSMAL ATRIAL FIBRILLATION (HCC): ICD-10-CM

## 2022-07-18 RX ORDER — ROSUVASTATIN CALCIUM 40 MG/1
TABLET, COATED ORAL
Qty: 90 TABLET | Refills: 3 | Status: SHIPPED | OUTPATIENT
Start: 2022-07-18

## 2022-08-05 ENCOUNTER — OFFICE VISIT (OUTPATIENT)
Dept: CARDIOLOGY CLINIC | Age: 72
End: 2022-08-05
Payer: MEDICARE

## 2022-08-05 VITALS
HEIGHT: 70 IN | OXYGEN SATURATION: 100 % | DIASTOLIC BLOOD PRESSURE: 64 MMHG | WEIGHT: 165 LBS | BODY MASS INDEX: 23.62 KG/M2 | HEART RATE: 51 BPM | SYSTOLIC BLOOD PRESSURE: 169 MMHG

## 2022-08-05 DIAGNOSIS — E78.2 MIXED HYPERLIPIDEMIA: ICD-10-CM

## 2022-08-05 DIAGNOSIS — I48.0 PAROXYSMAL ATRIAL FIBRILLATION (HCC): ICD-10-CM

## 2022-08-05 DIAGNOSIS — I25.10 ATHEROSCLEROSIS OF NATIVE CORONARY ARTERY OF NATIVE HEART WITHOUT ANGINA PECTORIS: Primary | ICD-10-CM

## 2022-08-05 DIAGNOSIS — Z95.1 POSTSURGICAL AORTOCORONARY BYPASS STATUS: ICD-10-CM

## 2022-08-05 DIAGNOSIS — Z95.810 ICD (IMPLANTABLE CARDIOVERTER-DEFIBRILLATOR) IN PLACE: ICD-10-CM

## 2022-08-05 DIAGNOSIS — Z71.6 TOBACCO ABUSE COUNSELING: ICD-10-CM

## 2022-08-05 DIAGNOSIS — I10 ESSENTIAL HYPERTENSION, BENIGN: ICD-10-CM

## 2022-08-05 PROCEDURE — 3017F COLORECTAL CA SCREEN DOC REV: CPT | Performed by: INTERNAL MEDICINE

## 2022-08-05 PROCEDURE — G8420 CALC BMI NORM PARAMETERS: HCPCS | Performed by: INTERNAL MEDICINE

## 2022-08-05 PROCEDURE — 1101F PT FALLS ASSESS-DOCD LE1/YR: CPT | Performed by: INTERNAL MEDICINE

## 2022-08-05 PROCEDURE — 1090F PRES/ABSN URINE INCON ASSESS: CPT | Performed by: INTERNAL MEDICINE

## 2022-08-05 PROCEDURE — 1123F ACP DISCUSS/DSCN MKR DOCD: CPT | Performed by: INTERNAL MEDICINE

## 2022-08-05 PROCEDURE — 93290 INTERROG DEV EVAL ICPMS IP: CPT | Performed by: INTERNAL MEDICINE

## 2022-08-05 PROCEDURE — G8754 DIAS BP LESS 90: HCPCS | Performed by: INTERNAL MEDICINE

## 2022-08-05 PROCEDURE — 99214 OFFICE O/P EST MOD 30 MIN: CPT | Performed by: INTERNAL MEDICINE

## 2022-08-05 PROCEDURE — G8536 NO DOC ELDER MAL SCRN: HCPCS | Performed by: INTERNAL MEDICINE

## 2022-08-05 PROCEDURE — 93284 PRGRMG EVAL IMPLANTABLE DFB: CPT | Performed by: INTERNAL MEDICINE

## 2022-08-05 PROCEDURE — G8427 DOCREV CUR MEDS BY ELIG CLIN: HCPCS | Performed by: INTERNAL MEDICINE

## 2022-08-05 PROCEDURE — G8400 PT W/DXA NO RESULTS DOC: HCPCS | Performed by: INTERNAL MEDICINE

## 2022-08-05 PROCEDURE — G8753 SYS BP > OR = 140: HCPCS | Performed by: INTERNAL MEDICINE

## 2022-08-05 PROCEDURE — G8432 DEP SCR NOT DOC, RNG: HCPCS | Performed by: INTERNAL MEDICINE

## 2022-08-05 NOTE — PATIENT INSTRUCTIONS
There are no discontinued medications. High Blood Pressure: Care Instructions  Overview     It's normal for blood pressure to go up and down throughout the day. But if it stays up, you have high blood pressure. Another name for high blood pressure is hypertension. Despite what a lot of people think, high blood pressure usually doesn't cause headaches or make you feel dizzy or lightheaded. It usually has no symptoms. But it does increase your risk of stroke, heart attack, and other problems. You and your doctor will talk about your risks of these problems based on your blood pressure. Your doctor will give you a goal for your blood pressure. Your goal will be based on your health and your age. Lifestyle changes, such as eating healthy and being active, are always important to help lower blood pressure. You might also take medicine to reach your blood pressure goal.  Follow-up care is a key part of your treatment and safety. Be sure to make and go to all appointments, and call your doctor if you are having problems. It's also a good idea to know your test results and keep a list of the medicines you take. How can you care for yourself at home? Medical treatment  If you stop taking your medicine, your blood pressure will go back up. You may take one or more types of medicine to lower your blood pressure. Be safe with medicines. Take your medicine exactly as prescribed. Call your doctor if you think you are having a problem with your medicine. Talk to your doctor before you start taking aspirin every day. Aspirin can help certain people lower their risk of a heart attack or stroke. But taking aspirin isn't right for everyone, because it can cause serious bleeding. See your doctor regularly. You may need to see the doctor more often at first or until your blood pressure comes down.   If you are taking blood pressure medicine, talk to your doctor before you take decongestants or anti-inflammatory medicine, such as ibuprofen. Some of these medicines can raise blood pressure. Learn how to check your blood pressure at home. Lifestyle changes  Stay at a healthy weight. This is especially important if you put on weight around the waist. Losing even 10 pounds can help you lower your blood pressure. If your doctor recommends it, get more exercise. Walking is a good choice. Bit by bit, increase the amount you walk every day. Try for at least 30 minutes on most days of the week. You also may want to swim, bike, or do other activities. Avoid or limit alcohol. Talk to your doctor about whether you can drink any alcohol. Try to limit how much sodium you eat to less than 2,300 milligrams (mg) a day. Your doctor may ask you to try to eat less than 1,500 mg a day. Eat plenty of fruits (such as bananas and oranges), vegetables, legumes, whole grains, and low-fat dairy products. Lower the amount of saturated fat in your diet. Saturated fat is found in animal products such as milk, cheese, and meat. Limiting these foods may help you lose weight and also lower your risk for heart disease. Do not smoke. Smoking increases your risk for heart attack and stroke. If you need help quitting, talk to your doctor about stop-smoking programs and medicines. These can increase your chances of quitting for good. When should you call for help? Call 911  anytime you think you may need emergency care. This may mean having symptoms that suggest that your blood pressure is causing a serious heart or blood vessel problem. Your blood pressure may be over 180/120. For example, call 911 if:    You have symptoms of a heart attack. These may include:  Chest pain or pressure, or a strange feeling in the chest.  Sweating. Shortness of breath. Nausea or vomiting. Pain, pressure, or a strange feeling in the back, neck, jaw, or upper belly or in one or both shoulders or arms. Lightheadedness or sudden weakness. A fast or irregular heartbeat.      You have symptoms of a stroke. These may include:  Sudden numbness, tingling, weakness, or loss of movement in your face, arm, or leg, especially on only one side of your body. Sudden vision changes. Sudden trouble speaking. Sudden confusion or trouble understanding simple statements. Sudden problems with walking or balance. A sudden, severe headache that is different from past headaches. You have severe back or belly pain. Do not wait until your blood pressure comes down on its own. Get help right away. Call your doctor now or seek immediate care if:    Your blood pressure is much higher than normal (such as 180/120 or higher), but you don't have symptoms. You think high blood pressure is causing symptoms, such as:  Severe headache. Blurry vision. Watch closely for changes in your health, and be sure to contact your doctor if:    Your blood pressure measures higher than your doctor recommends at least 2 times. That means the top number is higher or the bottom number is higher, or both. You think you may be having side effects from your blood pressure medicine. Where can you learn more? Go to http://www.gray.com/  Enter [id number] in the search box to learn more about \"High Blood Pressure: Care Instructions. \"  Current as of: January 10, 2022               Content Version: 13.2  © 2006-2022 [employer]wise[employer]. Care instructions adapted under license by [employer] (which disclaims liability or warranty for this information). If you have questions about a medical condition or this instruction, always ask your healthcare professional. [place] [address] any warranty or liability for your use of this information.

## 2022-08-05 NOTE — PROGRESS NOTES
1. Have you been to the ER, urgent care clinic since your last visit? Hospitalized since your last visit? No    2. Have you seen or consulted any other health care providers outside of the [address] [place] [address] since your last visit? Include any pap smears or colon screening. No     3. Since your last visit, have you had any of the following symptoms? None    4. Have you had any blood work, X-rays or cardiac testing? No     Requested: NO     In [facility]: NO    5. Where do you normally have your labs drawn? [facility]    6. Do you need any refills today?    No

## 2022-08-05 NOTE — PROGRESS NOTES
HISTORY OF PRESENT ILLNESS  Yann Barbour is a 66 y.o. female. Follow-up of CAD, status post CABG, hypertension, paroxysmal A. fib, status post ICD, hyperlipidemia. 6/20 seen for abnormal stress test which was done in [facility] in March 2024 preop testing for vascular surgery and was found to be abnormal.  She was recently seen 3 weeks ago when she did not mention anything about stress test being done in an outside hospital.    3/2021 patient presents to follow-up for hypertension and lab results  4/22 seen after cardioversion. Staying in sinus rhythm with demand atrial pacing. Feeling well without any new symptoms. Follow-up  Associated symptoms include shortness of breath. Pertinent negatives include no chest pain and no headaches. Palpitations   The history is provided by the Medical records (PAF on ICD interrogation). Associated symptoms include lower extremity edema and shortness of breath. Pertinent negatives include no fever, no malaise/fatigue, no chest pain, no claudication, no orthopnea, no PND, no nausea, no vomiting, no headaches, no dizziness and no cough. Her past medical history is significant for hypertension. Review of Systems   Constitutional:  Negative for chills, fever, malaise/fatigue and weight loss. HENT:  Negative for nosebleeds. Eyes:  Negative for discharge. Respiratory:  Positive for shortness of breath. Negative for cough and wheezing. Cardiovascular:  Positive for palpitations and leg swelling. Negative for chest pain, orthopnea, claudication and PND. Gastrointestinal:  Negative for diarrhea, nausea and vomiting. Genitourinary:  Negative for dysuria and hematuria. Musculoskeletal:  Negative for joint pain. Skin:  Negative for rash. Neurological:  Negative for dizziness, seizures, loss of consciousness and headaches. Endo/Heme/Allergies:  Negative for polydipsia. Does not bruise/bleed easily.    Psychiatric/Behavioral:  Negative for depression and substance abuse. The patient does not have insomnia. Allergies   Allergen Reactions    Losartan Angioedema     Tongue swelling but not on lips       Past Medical History:   Diagnosis Date    Arthritis     Automatic implantable cardiac defibrillator in situ     Coronary atherosclerosis of native coronary artery     CABG 10/02 with LIMA to LAD and SVG to RCA    Depression     Essential hypertension     Headache(784.0)     Other and unspecified hyperlipidemia     9/12 Low density lipoproteins are at goal, triglycerides are at goal, high density lipoproteins are at goal.    Postsurgical aortocoronary bypass status 2002    Presence of permanent cardiac pacemaker-DDD 11/6/2015    OFFICE CHECK : 8/14 nl function, +AHR rare(changed detection to over 150/mt); 11/15 nl function     Unspecified arthropathy, lower leg     S/P Left knee replacement. Had repeat surgery's for her left knee. UTI (urinary tract infection)        Family History   Problem Relation Age of Onset    OSTEOARTHRITIS Other         not specified    Hypertension Other         not specified    Sudden Death Neg Hx     Heart Attack Neg Hx        Social History     Tobacco Use    Smoking status: Every Day     Packs/day: 0.05     Years: 6.00     Pack years: 0.30     Types: Cigarettes     Start date: 2015    Smokeless tobacco: Never    Tobacco comments:     pt states she do not smoke every day   Substance Use Topics    Alcohol use: No    Drug use: Never        Current Outpatient Medications   Medication Sig    rosuvastatin (CRESTOR) 40 mg tablet TAKE 1 TABLET BY MOUTH AT  NIGHT    hydrALAZINE (APRESOLINE) 25 mg tablet Take 1 Tablet by mouth three (3) times daily. Indications: high blood pressure    sotaloL (BETAPACE) 80 mg tablet Take 1 Tablet by mouth two (2) times a day. doxazosin (CARDURA) 8 mg tablet Take 1 Tablet by mouth two (2) times a day.  (Patient taking differently: Take 8 mg by mouth in the morning.)    apixaban (Eliquis) 5 mg tablet take 1 tablet by mouth twice a day    ezetimibe (ZETIA) 10 mg tablet Take 1 Tablet by mouth daily. amLODIPine (NORVASC) 10 mg tablet Take 1 Tablet by mouth daily. albuterol (PROVENTIL HFA, VENTOLIN HFA, PROAIR HFA) 90 mcg/actuation inhaler Take 2 Puffs by inhalation as needed. furosemide (LASIX) 20 mg tablet take 1 tablet by mouth once daily if needed for edema    multivitamin (ONE A DAY) tablet Take 1 Tab by mouth daily. cyanocobalamin (VITAMIN B-12) 1,000 mcg sublingual tablet Take 1,000 mcg by mouth. Every other friday    acetaminophen (TYLENOL) 500 mg tablet Take  by mouth every six (6) hours as needed for Pain. No current facility-administered medications for this visit. Past Surgical History:   Procedure Laterality Date    HX CORONARY ARTERY BYPASS GRAFT  10/2002    HX ORTHOPAEDIC  277455    Reduced two component revision using the Sigma System. Lateral release. Suture anchor repair of medial collateral ligament. HX ORTHOPAEDIC  403537    Left knee arthrofibrosis, status post significant scarring, status post left total knee replacement    MA CARDIAC SURG PROCEDURE UNLIST  11/09    gen change-obici    VASCULAR SURGERY PROCEDURE UNLIST  2005    Aorto biffem.   Dr. Mariah Salgado       Diagnostic Studies:  CARDIOLOGY STUDIES 10/5/2017   Pharmacological Nuclear Stress Test Result mild small infbasal ischemia, 73%EF   Some recent data might be hidden     3/20 nuclear stress test  MARIANN REST/STRESS MULTIPLE [facility]  Component Name Value Ref Range   EF Nuc 52     Result Impression    THIS MYOCARDIAL PERFUSION STUDY IS ABNORMAL   THIS IS A HIGH RISK STUDY   ABNORMAL NUCLEAR STUDY SUGGESTIVE OF TWO-VESSEL DISEASE     ON INITIAL STRESS IMAGES THERE IS A MODERATE TO LARGE AREA OF DIMINISHED UPTAKE IN THE ISOTOPE   THROUGHOUT THE ANTERIOR SEPTAL AND THE LATERAL WALL     ON THE REST IMAGES THERE IS ENHANCED UPTAKE THROUGHOUT THE ANTERIOR SEPTAL AND LATERAL WALL     THE GATED ACQUISITION SHOWS LATERAL WALL HYPOKINESIS AND AN EJECTION FRACTION ESTIMATED AT   50%     THE ABOVE FINDINGS ARE CONSISTENT WITH TWO-VESSEL ISCHEMIA INVOLVING THE PROXIMAL LAD AND   CIRCUMFLEX ARTERY   8/21 echo  Interpretation Summary    LV: Estimated LVEF is 40 - 45%. Normal cavity size. Moderate concentric hypertrophy. Mild hypokinesis of the mid inferior and mid inferoseptal wall(s). Severe hypokinesis of the basal inferior and basal inferoseptal wall(s). Severe (grade 3) left ventricular diastolic dysfunction. LA: Moderately dilated left atrium. Left Atrium volume index is 48.02 mL/m2. RV: Pacer/ICD present. RA: Mildly dilated right atrium. MV: Mitral valve thickening. Mitral annular calcification. TV: Mild tricuspid valve regurgitation is present. Right Ventricular Arterial Pressure (RVSP) is 35 mmHg. Pulmonary hypertension not suggested by Doppler findings. PV: Mild pulmonic valve regurgitation is present. Comparison Study Information    Prior Study    When compared to the previous study from 1/27/09, the overall left ventricular ejection fraction now appears mildly reduced, there is now moderate left ventricular hypertrophy, the wall motion abnormalities appear to be new, and there is now bi atrial enlargement. Visit Vitals  BP (!) 169/64 (BP 1 Location: Left upper arm, BP Patient Position: Sitting, BP Cuff Size: Small adult)   Pulse (!) 51   Ht 5' 10\" (1.778 m)   Wt 74.8 kg (165 lb)   SpO2 100%   BMI 23.68 kg/m²       Ms. Raquel Ramírez has a reminder for a \"due or due soon\" health maintenance. I have asked that she contact her primary care provider for follow-up on this health maintenance. Physical Exam  Vitals and nursing note reviewed. Constitutional:       General: She is not in acute distress. Appearance: She is well-developed. Comments: kyphosis   HENT:      Head: Normocephalic and atraumatic. Eyes:      General: No scleral icterus. Right eye: No discharge.          Left eye: No discharge. Neck:      Thyroid: No thyromegaly. Vascular: No carotid bruit or JVD. Cardiovascular:      Rate and Rhythm: Normal rate and regular rhythm. Pulses: Intact distal pulses. Heart sounds: Normal heart sounds, S1 normal and S2 normal. No murmur heard. No friction rub. No gallop. Pulmonary:      Effort: Pulmonary effort is normal.      Breath sounds: Normal breath sounds. No wheezing or rales. Abdominal:      Palpations: Abdomen is soft. There is no mass. Tenderness: There is no abdominal tenderness. Musculoskeletal:         General: Deformity: 1-2. Normal range of motion. Cervical back: Neck supple. Right lower leg: No edema. Left lower leg: No edema. Comments: Cant put legs straight   Skin:     General: Skin is warm and dry. Findings: No rash. Neurological:      Mental Status: She is alert and oriented to person, place, and time.    Psychiatric:         Behavior: Behavior normal.   wounds healing well left subclavicular area     ASSESSMENT and PLAN      AFib / Flutter seen on interrogation of ICD  Started Eliquis 5 mg / BID  HLD: 5/17 LDL69; 2/16 LDL81;    Latest Reference Range & Units 12/18/15 08:54 2/3/16 09:21 3/9/17 08:55 5/24/17 09:20 3/19/21 00:00 7/7/21 00:00   Triglyceride 0 - 149 mg/dL 79 74 86 74 69 62   Cholesterol, total 100 - 199 mg/dL 171 153 146 139 121 125   HDL Cholesterol >39 mg/dL 55 57 57 55 46 49   LDL, calculated 0 - 99 mg/dL 100 (H) 81 71 69 61 63   VLDL, calculated 5 - 40 mg/dL 16 15 17 15 14 13   (H): Data is abnormally high        BiV ICD: gen change 11/09; 3/17  CARE LINK 10/10; 8/12; 4/13; 8/13; 2/16; 5/16; 8/16  nl function, nl volume; 2/17 nl function; DONNY; 8/17; 1/19; 11/19 nl function, nl volume; 2/20 normal function, normal volume, PAF; 6/20 normal function, recent increase in volume; 3/21 normal function, high-volume, frequent PAF; 6/21 normal function, nearly persistent A. fib, intermittent high-volume;  OFFICE CHECK : 8/14 nl function, +AHR rare(changed detection to over 150/mt); 11/15; 11/16; 3/17 nl function, nl vol; 4/18 nl function, frequent AHR(likely AFlutter), nl volume; Normal function, normal volume, AT/AF episodes are far field sensing events;  4/22 normal function, normal volume, permanent AF for many months now. Delete that;      6/1/2020 blood pressure is elevated. Increase Cardura to 8 mg at bedtime. Patient cannot check her blood pressures at home even though she has a machine. Continue Eliquis due to frequent AT/A. fib on ICD interrogation. Follow-up [facility] every 3 months. Office check in 6 months  Stable CAD. Continue present medications. She has developed mild edema on the right leg but recently had a lot of stress and likely dietary indiscretion due to sister in the hospital and eventually dying in the hospital.  Patient trying to stop smoking and she was congratulated for that. Hopefully she will discontinue very soon. She has cut down the smoking significantly. Lasix helping well but she does not like to take it daily. 2/19/2021 CAD stable. Continue same medications  BP is elevated. Increase doxazosin to 8 mg twice daily. Follow home chart and visit with nurse practitioner 1 week approximately to check on the pressure. Check [facility] ASAP, preferably through today. Continue Eliquis for paroxysmal A. fib. Routine labs to check liver and lipids  Tobacco cessation discussed and reinforced again. She says she smokes 3 cigarettes a day and does not inhale. 3/25/2021  Blood pressure has improved with recent increase in doxazosin. Home chart reviewed blood pressure consistently 130s over 80s. CAD is stable with no symptoms presently, continue current medications. Recent LFT and lipids reviewed and discussed with patient lipids at goal.  She reports stop smoking cigarettes on March 19 and for this was congratulated     6/21/2021 has CHF exacerbation again like last year.   Was in ER and improved with Lasix. Still has edema and needs to continue. Check echo. Reduce amlodipine so she can have better diuresis. CAD stable. She is cutting down her tobacco and was congratulated. Hopefully she can just let it go now. Labs as ordered. 9/27/2021 CHF is compensated now NYHA class II. CAD stable without any significant angina. She has almost quit smoking and smokes 1 cigarette a week as she says. She was congratulated for that. Blood pressure is minimally elevated but she is not sure whether she takes doxazosin at home she will call back and then we can adjust the medications. ICD is functioning normally but needs to be interrogated again. Lipids are well controlled. Echo has shown mild reduction in LV function. Dilated biatrial sizes. Medical treatment to continue. 4/1/2022 CAD stable. CHF is worsening delete that CHF is stable. Blood pressure is elevated. Increase Norvasc and follow home chart. Patient seems to be in permanent A. fib for few months now. Discussed with her and options will be to start sotalol as inpatient or amiodarone as outpatient. My first choice will be sotalol. She will think about it and let us know early next week. Tobacco cessation reinforced. Addendum: While sitting in the office she change her mind and is ready to get admitted. We will admit her on Sunday with elective cardioversion Tuesday. 4/22 staying in sinus rhythm on sotalol. QT interval is increased but acceptable and will be followed closely. Next EKG in 1 week. Blood pressure is controlled well. CAD stable. Diagnoses and all orders for this visit:    1. Atherosclerosis of native coronary artery of native heart without angina pectoris    2. Essential hypertension, benign  -     METABOLIC PANEL, BASIC; Future  -     HEPATIC FUNCTION PANEL; Future  -     LIPID PANEL; Future    3. Paroxysmal atrial fibrillation (HCC)    4. Tobacco abuse counseling    5.  Postsurgical aortocoronary bypass status    6. ICD (implantable cardioverter-defibrillator) in place-DDD ICD  -     GRAM EVAL (IN PERSON) IMPLANT DEVICE,CARDVERT/DEFIB,MULT LEAD  -     IMPLANTABLE CARDIOVASULAR DANIEL SYST    7. Mixed hyperlipidemia  -     LIPID PANEL; Future        Pertinent laboratory and test data reviewed and discussed with patient. See patient instructions also for other medical advice given    There are no discontinued medications. Follow-up and Dispositions    Return in about 6 months (around 2/5/2023), or if symptoms worsen or fail to improve, for BP log x 4-5 days post med changes. 8/5/2022 CAD stable. Blood pressure is elevated. Patient is not taking hydralazine by misunderstanding and taking Cardura only once a day. We will continue Cardura once a day but explained to patient to take hydralazine 25 mg 3 times a day. Check BP chart at home. Tobacco cessation reinforced again. ICD function is normal but she continues to have paroxysmal short runs of A. fib. Continue anticoagulation. Lipids were controlled last year and will be followed again this year.

## 2022-08-22 DIAGNOSIS — I10 ESSENTIAL HYPERTENSION, BENIGN: Primary | ICD-10-CM

## 2022-08-22 RX ORDER — HYDRALAZINE HYDROCHLORIDE 25 MG/1
25 TABLET, FILM COATED ORAL 3 TIMES DAILY
Qty: 90 TABLET | Refills: 8 | Status: SHIPPED | OUTPATIENT
Start: 2022-08-22

## 2022-08-22 NOTE — TELEPHONE ENCOUNTER
Requested Prescriptions     Pending Prescriptions Disp Refills    hydrALAZINE (APRESOLINE) 25 mg tablet 90 Tablet 1     Sig: Take 1 Tablet by mouth three (3) times daily.  Indications: high blood pressure

## 2022-09-20 LAB
A-G RATIO,AGRAT: 1.1 RATIO (ref 1.1–2.6)
ALBUMIN SERPL-MCNC: 3.9 G/DL (ref 3.5–5)
ALP SERPL-CCNC: 56 U/L (ref 40–120)
ALT SERPL-CCNC: 14 U/L (ref 5–40)
ANION GAP SERPL CALC-SCNC: 9 MMOL/L (ref 3–15)
AST SERPL W P-5'-P-CCNC: 25 U/L (ref 10–37)
BILIRUB SERPL-MCNC: 0.6 MG/DL (ref 0.2–1.2)
BILIRUBIN, DIRECT,CBIL: <0.2 MG/DL (ref 0–0.3)
BUN SERPL-MCNC: 15 MG/DL (ref 6–22)
CALCIUM SERPL-MCNC: 8.9 MG/DL (ref 8.4–10.5)
CHLORIDE SERPL-SCNC: 110 MMOL/L (ref 98–110)
CHOLEST SERPL-MCNC: 131 MG/DL (ref 110–200)
CO2 SERPL-SCNC: 23 MMOL/L (ref 20–32)
CREAT SERPL-MCNC: 1 MG/DL (ref 0.8–1.4)
GLOBULIN,GLOB: 3.5 G/DL (ref 2–4)
GLOMERULAR FILTRATION RATE: 56.8 ML/MIN/1.73 SQ.M.
GLUCOSE SERPL-MCNC: 91 MG/DL (ref 70–99)
HDLC SERPL-MCNC: 2.3 MG/DL (ref 0–5)
HDLC SERPL-MCNC: 57 MG/DL
LDL/HDL RATIO,LDHD: 1.1
LDLC SERPL CALC-MCNC: 62 MG/DL (ref 50–99)
NON-HDL CHOLESTEROL, 011976: 74 MG/DL
POTASSIUM SERPL-SCNC: 4.2 MMOL/L (ref 3.5–5.5)
PROT SERPL-MCNC: 7.4 G/DL (ref 6.2–8.1)
SODIUM SERPL-SCNC: 142 MMOL/L (ref 133–145)
TRIGL SERPL-MCNC: 58 MG/DL (ref 40–149)
VLDLC SERPL CALC-MCNC: 12 MG/DL (ref 8–30)

## 2022-11-18 ENCOUNTER — OFFICE VISIT (OUTPATIENT)
Dept: CARDIOLOGY CLINIC | Age: 72
End: 2022-11-18
Payer: MEDICARE

## 2022-11-18 VITALS
SYSTOLIC BLOOD PRESSURE: 135 MMHG | HEIGHT: 70 IN | BODY MASS INDEX: 21.19 KG/M2 | OXYGEN SATURATION: 99 % | DIASTOLIC BLOOD PRESSURE: 60 MMHG | WEIGHT: 148 LBS | HEART RATE: 54 BPM

## 2022-11-18 DIAGNOSIS — Z71.6 TOBACCO ABUSE COUNSELING: ICD-10-CM

## 2022-11-18 DIAGNOSIS — E78.2 MIXED HYPERLIPIDEMIA: ICD-10-CM

## 2022-11-18 DIAGNOSIS — I50.32 CHRONIC DIASTOLIC CONGESTIVE HEART FAILURE (HCC): ICD-10-CM

## 2022-11-18 DIAGNOSIS — I10 ESSENTIAL HYPERTENSION, BENIGN: ICD-10-CM

## 2022-11-18 DIAGNOSIS — I48.0 PAROXYSMAL ATRIAL FIBRILLATION (HCC): ICD-10-CM

## 2022-11-18 DIAGNOSIS — Z95.810 ICD (IMPLANTABLE CARDIOVERTER-DEFIBRILLATOR) IN PLACE: ICD-10-CM

## 2022-11-18 DIAGNOSIS — I25.10 ATHEROSCLEROSIS OF NATIVE CORONARY ARTERY OF NATIVE HEART WITHOUT ANGINA PECTORIS: Primary | ICD-10-CM

## 2022-11-18 DIAGNOSIS — Z95.1 POSTSURGICAL AORTOCORONARY BYPASS STATUS: ICD-10-CM

## 2022-11-18 PROCEDURE — 3017F COLORECTAL CA SCREEN DOC REV: CPT | Performed by: NURSE PRACTITIONER

## 2022-11-18 PROCEDURE — G8427 DOCREV CUR MEDS BY ELIG CLIN: HCPCS | Performed by: NURSE PRACTITIONER

## 2022-11-18 PROCEDURE — G8432 DEP SCR NOT DOC, RNG: HCPCS | Performed by: NURSE PRACTITIONER

## 2022-11-18 PROCEDURE — 1090F PRES/ABSN URINE INCON ASSESS: CPT | Performed by: NURSE PRACTITIONER

## 2022-11-18 PROCEDURE — 1123F ACP DISCUSS/DSCN MKR DOCD: CPT | Performed by: NURSE PRACTITIONER

## 2022-11-18 PROCEDURE — 3074F SYST BP LT 130 MM HG: CPT | Performed by: NURSE PRACTITIONER

## 2022-11-18 PROCEDURE — G8400 PT W/DXA NO RESULTS DOC: HCPCS | Performed by: NURSE PRACTITIONER

## 2022-11-18 PROCEDURE — G8752 SYS BP LESS 140: HCPCS | Performed by: NURSE PRACTITIONER

## 2022-11-18 PROCEDURE — 99214 OFFICE O/P EST MOD 30 MIN: CPT | Performed by: NURSE PRACTITIONER

## 2022-11-18 PROCEDURE — 3078F DIAST BP <80 MM HG: CPT | Performed by: NURSE PRACTITIONER

## 2022-11-18 PROCEDURE — G8536 NO DOC ELDER MAL SCRN: HCPCS | Performed by: NURSE PRACTITIONER

## 2022-11-18 PROCEDURE — 1101F PT FALLS ASSESS-DOCD LE1/YR: CPT | Performed by: NURSE PRACTITIONER

## 2022-11-18 PROCEDURE — G8420 CALC BMI NORM PARAMETERS: HCPCS | Performed by: NURSE PRACTITIONER

## 2022-11-18 PROCEDURE — G8754 DIAS BP LESS 90: HCPCS | Performed by: NURSE PRACTITIONER

## 2022-11-18 RX ORDER — GUAIFENESIN 100 MG/5ML
LIQUID (ML) ORAL
COMMUNITY
Start: 2022-10-26

## 2022-11-18 RX ORDER — TORSEMIDE 20 MG/1
TABLET ORAL
COMMUNITY
Start: 2022-10-27

## 2022-11-18 NOTE — PROGRESS NOTES
HISTORY OF PRESENT ILLNESS  Sterling Perkins is a 66 y.o. female. Follow-up of CAD, status post CABG, hypertension, paroxysmal A. fib, status post ICD, hyperlipidemia. 6/20 seen for abnormal stress test which was done in [facility] in March 2024 preop testing for vascular surgery and was found to be abnormal.  She was recently seen 3 weeks ago when she did not mention anything about stress test being done in an outside hospital.    3/2021 patient presents to follow-up for hypertension and lab results  4/22 seen after cardioversion. Staying in sinus rhythm with demand atrial pacing. Feeling well without any new symptoms. 11/2022  Patient seen following admission to [facility] for acute on chronic systolic CHF. She was diuresed and ICD was interrogated with normal function, AFib episodes noted, elevated optivol. He medications were adjusted prior to d/c to home. Patient reports she has not smoked in 8 days. She denies chest pain, shortness of breath, palpitations or edema. Follow-up  Pertinent negatives include no chest pain, no headaches and no shortness of breath. Palpitations   The history is provided by the Medical records (PAF on ICD interrogation). Associated symptoms include lower extremity edema. Pertinent negatives include no fever, no malaise/fatigue, no chest pain, no claudication, no orthopnea, no PND, no nausea, no vomiting, no headaches, no dizziness, no cough and no shortness of breath. Her past medical history is significant for hypertension. Review of Systems   Constitutional:  Negative for chills, fever, malaise/fatigue and weight loss. HENT:  Negative for nosebleeds. Eyes:  Negative for discharge. Respiratory:  Negative for cough, shortness of breath and wheezing. Cardiovascular:  Negative for chest pain, palpitations, orthopnea, claudication, leg swelling and PND. Gastrointestinal:  Negative for diarrhea, nausea and vomiting.    Genitourinary:  Negative for dysuria and hematuria. Musculoskeletal:  Negative for joint pain. Skin:  Negative for rash. Neurological:  Negative for dizziness, seizures, loss of consciousness and headaches. Endo/Heme/Allergies:  Negative for polydipsia. Does not bruise/bleed easily. Psychiatric/Behavioral:  Negative for depression and substance abuse. The patient does not have insomnia. Allergies   Allergen Reactions    Losartan Angioedema     Tongue swelling but not on lips       Past Medical History:   Diagnosis Date    Arthritis     Automatic implantable cardiac defibrillator in situ     Coronary atherosclerosis of native coronary artery     CABG 10/02 with LIMA to LAD and SVG to RCA    Depression     Essential hypertension     Headache(784.0)     Other and unspecified hyperlipidemia     9/12 Low density lipoproteins are at goal, triglycerides are at goal, high density lipoproteins are at goal.    Postsurgical aortocoronary bypass status 2002    Presence of permanent cardiac pacemaker-DDD 11/6/2015    OFFICE CHECK : 8/14 nl function, +AHR rare(changed detection to over 150/mt); 11/15 nl function     Unspecified arthropathy, lower leg     S/P Left knee replacement. Had repeat surgery's for her left knee.     UTI (urinary tract infection)        Family History   Problem Relation Age of Onset    OSTEOARTHRITIS Other         not specified    Hypertension Other         not specified    Sudden Death Neg Hx     Heart Attack Neg Hx        Social History     Tobacco Use    Smoking status: Former     Packs/day: 0.05     Years: 6.00     Pack years: 0.30     Types: Cigarettes     Start date: 2015    Smokeless tobacco: Never    Tobacco comments:     pt states she do not smoke every day   Substance Use Topics    Alcohol use: No    Drug use: Never        Current Outpatient Medications   Medication Sig    aspirin 81 mg chewable tablet chew and swallow 1 tablet by mouth once daily    torsemide (DEMADEX) 20 mg tablet     rosuvastatin (CRESTOR) 40 mg tablet TAKE 1 TABLET BY MOUTH AT  NIGHT    sotaloL (BETAPACE) 80 mg tablet Take 1 Tablet by mouth two (2) times a day. apixaban (Eliquis) 5 mg tablet take 1 tablet by mouth twice a day    ezetimibe (ZETIA) 10 mg tablet Take 1 Tablet by mouth daily. amLODIPine (NORVASC) 10 mg tablet Take 1 Tablet by mouth daily. albuterol (PROVENTIL HFA, VENTOLIN HFA, PROAIR HFA) 90 mcg/actuation inhaler Take 2 Puffs by inhalation as needed. multivitamin (ONE A DAY) tablet Take 1 Tab by mouth daily. cyanocobalamin (VITAMIN B-12) 1,000 mcg sublingual tablet Take 1,000 mcg by mouth. Every other friday    acetaminophen (TYLENOL) 500 mg tablet Take  by mouth every six (6) hours as needed for Pain. doxazosin (CARDURA) 8 mg tablet Take 1 Tablet by mouth two (2) times a day. (Patient not taking: Reported on 11/18/2022)     No current facility-administered medications for this visit. Past Surgical History:   Procedure Laterality Date    HX CORONARY ARTERY BYPASS GRAFT  10/2002     ORTHOPAEDIC  132529    Reduced two component revision using the Sigma System. Lateral release. Suture anchor repair of medial collateral ligament. HX ORTHOPAEDIC  393177    Left knee arthrofibrosis, status post significant scarring, status post left total knee replacement    PA CARDIAC SURG PROCEDURE UNLIST  11/09    gen change-obici    VASCULAR SURGERY PROCEDURE UNLIST  2005    Aorto biffem.   Dr. Tomas Burns       Diagnostic Studies:  CARDIOLOGY STUDIES 10/5/2017   Pharmacological Nuclear Stress Test Result mild small infbasal ischemia, 73%EF   Some recent data might be hidden     3/20 nuclear stress test  MARIANN REST/STRESS MULTIPLE [facility]  Component Name Value Ref Range   EF Nuc 52     Result Impression    THIS MYOCARDIAL PERFUSION STUDY IS ABNORMAL   THIS IS A HIGH RISK STUDY   ABNORMAL NUCLEAR STUDY SUGGESTIVE OF TWO-VESSEL DISEASE     ON INITIAL STRESS IMAGES THERE IS A MODERATE TO LARGE AREA OF DIMINISHED UPTAKE IN THE ISOTOPE   THROUGHOUT THE ANTERIOR SEPTAL AND THE LATERAL WALL     ON THE REST IMAGES THERE IS ENHANCED UPTAKE THROUGHOUT THE ANTERIOR SEPTAL AND LATERAL WALL     THE GATED ACQUISITION SHOWS LATERAL WALL HYPOKINESIS AND AN EJECTION FRACTION ESTIMATED AT   50%     THE ABOVE FINDINGS ARE CONSISTENT WITH TWO-VESSEL ISCHEMIA INVOLVING THE PROXIMAL LAD AND   [address] ARTERY   8/21 echo  Interpretation Summary    LV: Estimated LVEF is 40 - 45%. Normal cavity size. Moderate concentric hypertrophy. Mild hypokinesis of the mid inferior and mid inferoseptal wall(s). Severe hypokinesis of the basal inferior and basal inferoseptal wall(s). Severe (grade 3) left ventricular diastolic dysfunction. LA: Moderately dilated left atrium. Left Atrium volume index is 48.02 mL/m2. RV: Pacer/ICD present. RA: Mildly dilated right atrium. MV: Mitral valve thickening. Mitral annular calcification. TV: Mild tricuspid valve regurgitation is present. Right Ventricular Arterial Pressure (RVSP) is 35 mmHg. Pulmonary hypertension not suggested by Doppler findings. PV: Mild pulmonic valve regurgitation is present. Comparison Study Information    Prior Study    When compared to the previous study from 1/27/09, the overall left ventricular ejection fraction now appears mildly reduced, there is now moderate left ventricular hypertrophy, the wall motion abnormalities appear to be new, and there is now bi atrial enlargement. 10/2022  Echo  Narrative    CONCLUSIONS     * Wall motion abnormalities present with a reduced biplane ejection fraction   of 37%. * Mildly dilated left ventricle with mild posterior hypertrophy. * Left ventricular diastolic function: Grade II diastolic dysfunction. * Mild pulmonary hypertension, estimated pulmonary arterial systolic   pressure is 45 mmHg. * Dilated right ventricular size with preserved systolic function.      * Left atrial chamber is mildly enlarged with a left atrial volume index of   36.62 ml/m^2. * There is mild tricuspid valve regurgitation. * No other hemodynamically significant valvular disease. * Dilated inferior vena cava with >50% collapse upon inspiration consistent   with elevated right atrial pressure, 8 mmHg. * No masses, shunts, or thrombi. Comparison     * Compared to prior study from 8/16/2021, the right atrium is no longer   dilated, and the pulmonary hypertension is a new finding. Visit Vitals  /60   Pulse (!) 54   Ht 5' 10\" (1.778 m)   Wt 67.1 kg (148 lb)   SpO2 99%   BMI 21.24 kg/m²         Ms. Haydee Jaramillo has a reminder for a \"due or due soon\" health maintenance. I have asked that she contact her primary care provider for follow-up on this health maintenance. Physical Exam  Vitals and nursing note reviewed. Constitutional:       General: She is not in acute distress. Appearance: She is well-developed. Comments: kyphosis   HENT:      Head: Normocephalic and atraumatic. Eyes:      General: No scleral icterus. Right eye: No discharge. Left eye: No discharge. Neck:      Thyroid: No thyromegaly. Vascular: No carotid bruit or JVD. Cardiovascular:      Rate and Rhythm: Normal rate and regular rhythm. Pulses: Intact distal pulses. Heart sounds: Normal heart sounds, S1 normal and S2 normal. No murmur heard. No friction rub. No gallop. Pulmonary:      Effort: Pulmonary effort is normal.      Breath sounds: Normal breath sounds. No wheezing or rales. Abdominal:      Palpations: Abdomen is soft. There is no mass. Tenderness: There is no abdominal tenderness. Musculoskeletal:         General: Deformity: 1-2. Normal range of motion. Cervical back: Neck supple. Right lower leg: No edema. Left lower leg: No edema. Comments: Cant put legs straight   Skin:     General: Skin is warm and dry. Findings: No rash.    Neurological:      Mental Status: She is alert and oriented to person, place, and time. Psychiatric:         Behavior: Behavior normal.   wounds healing well left subclavicular area     ASSESSMENT and PLAN      AFib / Flutter seen on interrogation of ICD  Started Eliquis 5 mg / BID  HLD: 5/17 LDL69; 2/16 LDL81;    Latest Reference Range & Units 12/18/15 08:54 2/3/16 09:21 3/9/17 08:55 5/24/17 09:20 3/19/21 00:00 7/7/21 00:00   Triglyceride 0 - 149 mg/dL 79 74 86 74 69 62   Cholesterol, total 100 - 199 mg/dL 171 153 146 139 121 125   HDL Cholesterol >39 mg/dL 55 57 57 55 46 49   LDL, calculated 0 - 99 mg/dL 100 (H) 81 71 69 61 63   VLDL, calculated 5 - 40 mg/dL 16 15 17 15 14 13   (H): Data is abnormally high        BiV ICD: gen change 11/09; 3/17  CARE LINK 10/10; 8/12; 4/13; 8/13; 2/16; 5/16; 8/16  nl function, nl volume; 2/17 nl function; DONNY; 8/17; 1/19; 11/19 nl function, nl volume; 2/20 normal function, normal volume, PAF; 6/20 normal function, recent increase in volume; 3/21 normal function, high-volume, frequent PAF; 6/21 normal function, nearly persistent A. fib, intermittent high-volume;  OFFICE CHECK : 8/14 nl function, +AHR rare(changed detection to over 150/mt); 11/15; 11/16; 3/17 nl function, nl vol; 4/18 nl function, frequent AHR(likely AFlutter), nl volume; Normal function, normal volume, AT/AF episodes are far field sensing events;  4/22 normal function, normal volume, permanent AF for many months now. Delete that;      6/1/2020 blood pressure is elevated. Increase Cardura to 8 mg at bedtime. Patient cannot check her blood pressures at home even though she has a machine. Continue Eliquis due to frequent AT/A. fib on ICD interrogation. Follow-up [facility] every 3 months. Office check in 6 months  Stable CAD. Continue present medications.   She has developed mild edema on the right leg but recently had a lot of stress and likely dietary indiscretion due to sister in the hospital and eventually dying in the hospital.  Patient trying to stop smoking and she was congratulated for that. Hopefully she will discontinue very soon. She has cut down the smoking significantly. Lasix helping well but she does not like to take it daily. Follow-up and Dispositions    Return in about 4 months (around 3/18/2023) for Follow up with Dr. Zoran Banerjee. 2/19/2021 CAD stable. Continue same medications  BP is elevated. Increase doxazosin to 8 mg twice daily. Follow home chart and visit with nurse practitioner 1 week approximately to check on the pressure. Check [facility] ASAP, preferably through today. Continue Eliquis for paroxysmal A. fib. Routine labs to check liver and lipids  Tobacco cessation discussed and reinforced again. She says she smokes 3 cigarettes a day and does not inhale. 3/25/2021  Blood pressure has improved with recent increase in doxazosin. Home chart reviewed blood pressure consistently 130s over 80s. CAD is stable with no symptoms presently, continue current medications. Recent LFT and lipids reviewed and discussed with patient lipids at goal.  She reports stop smoking cigarettes on March 19 and for this was congratulated   Follow-up and Dispositions    Return in about 4 months (around 3/18/2023) for Follow up with Dr. Zoran Banerjee. 6/21/2021 has CHF exacerbation again like last year. Was in ER and improved with Lasix. Still has edema and needs to continue. Check echo. Reduce amlodipine so she can have better diuresis. CAD stable. She is cutting down her tobacco and was congratulated. Hopefully she can just let it go now. Labs as ordered. Follow-up and Dispositions    Return in about 4 months (around 3/18/2023) for Follow up with Dr. Zoran Banerjee. 9/27/2021 CHF is compensated now NYHA class II. CAD stable without any significant angina. She has almost quit smoking and smokes 1 cigarette a week as she says. She was congratulated for that.   Blood pressure is minimally elevated but she is not sure whether she takes doxazosin at home she will call back and then we can adjust the medications. ICD is functioning normally but needs to be interrogated again. Lipids are well controlled. Echo has shown mild reduction in LV function. Dilated biatrial sizes. Medical treatment to continue. Follow-up and Dispositions    Return in about 4 months (around 3/18/2023) for Follow up with Dr. Charissa Love. 4/1/2022 CAD stable. CHF is worsening delete that CHF is stable. Blood pressure is elevated. Increase Norvasc and follow home chart. Patient seems to be in permanent A. fib for few months now. Discussed with her and options will be to start sotalol as inpatient or amiodarone as outpatient. My first choice will be sotalol. She will think about it and let us know early next week. Tobacco cessation reinforced. Addendum: While sitting in the office she change her mind and is ready to get admitted. We will admit her on Sunday with elective cardioversion Tuesday. 4/22 staying in sinus rhythm on sotalol. QT interval is increased but acceptable and will be followed closely. Next EKG in 1 week. Blood pressure is controlled well. CAD stable. Diagnoses and all orders for this visit:    1. Atherosclerosis of native coronary artery of native heart without angina pectoris    2. Postsurgical aortocoronary bypass status    3. ICD (implantable cardioverter-defibrillator) in place-DDD ICD    4. Paroxysmal atrial fibrillation (HCC)    5. Essential hypertension, benign    6. Tobacco abuse counseling    7. Mixed hyperlipidemia    8. Chronic diastolic congestive heart failure ([address]      Pertinent laboratory and test data reviewed and discussed with patient.   See patient instructions also for other medical advice given    Medications Discontinued During This Encounter   Medication Reason    furosemide (LASIX) 20 mg tablet DISCONTINUED BY ANOTHER CLINICIAN    hydrALAZINE (APRESOLINE) 25 mg tablet DISCONTINUED BY ANOTHER CLINICIAN       Follow-up and Dispositions    Return in about 4 months (around 3/18/2023) for Follow up with Dr. Kai Dail.     8/5/2022 CAD stable. Blood pressure is elevated. Patient is not taking hydralazine by misunderstanding and taking Cardura only once a day. We will continue Cardura once a day but explained to patient to take hydralazine 25 mg 3 times a day. Check BP chart at home. Tobacco cessation reinforced again. ICD function is normal but she continues to have paroxysmal short runs of A. fib. Continue anticoagulation. Lipids were controlled last year and will be followed again this year. 11/2022  Recent admission for acute on chronic HFrEF. Echocardiogram reviewed and discussed with patient EF 37% mild pulmonary hypertension TR no other hemodynamically significant valvular disease. Per notes ICD was interrogated with normal function episodes of A. fib noted with elevated OptiVol, patient was discharged with medications adjusted and she continues with torsemide and is followed by telehealth. She reports blood pressure and weight has been stable since discharge. We will continue current medications. Patient has not smoked in 8 days for which she was congratulated and encouraged to continue the good work.

## 2022-11-18 NOTE — PROGRESS NOTES
1. Have you been to the ER, urgent care clinic since your last visit? Hospitalized since your last visit? Yes Where: [facility]    2. Have you seen or consulted any other health care providers outside of the [address] [place] [address] since your last visit? Include any pap smears or colon screening.  No

## 2022-12-16 ENCOUNTER — OFFICE VISIT (OUTPATIENT)
Dept: ORTHOPEDIC SURGERY | Age: 72
End: 2022-12-16
Payer: MEDICARE

## 2022-12-16 VITALS
OXYGEN SATURATION: 99 % | WEIGHT: 143 LBS | TEMPERATURE: 97.2 F | BODY MASS INDEX: 20.47 KG/M2 | HEART RATE: 60 BPM | HEIGHT: 70 IN

## 2022-12-16 DIAGNOSIS — M50.30 DDD (DEGENERATIVE DISC DISEASE), CERVICAL: ICD-10-CM

## 2022-12-16 DIAGNOSIS — R20.2 PARESTHESIA OF HAND, BILATERAL: Primary | ICD-10-CM

## 2022-12-16 DIAGNOSIS — M47.812 CERVICAL SPONDYLOSIS WITHOUT MYELOPATHY: ICD-10-CM

## 2022-12-16 NOTE — LETTER
12/16/2022    Patient: Loida Coe   YOB: 1950   Date of Visit: 12/16/2022     Ja Chavez MD  [id number] [facility] [id number]  Via Fax: [phone]    Dear Ja Chavez MD,      Thank you for referring Ms. Matias Alatorre to [address] for evaluation. My notes for this consultation are attached. If you have questions, please do not hesitate to call me. I look forward to following your patient along with you.       Sincerely,    Naa Power MD

## 2022-12-16 NOTE — PROGRESS NOTES
[facility]  [address]  [place] [address] Darshan Stewart   Phone: [phone]  Fax: [phone]        INITIAL CONSULTATION      HISTORY OF PRESENT ILLNESS:  Sterling Perkins is a 74 y.o. female whom is referred from Dr. Mine Blackwell secondary to cervical radiculopathy. She rates her pain 0/10. Patient comes into the office today with bilateral Left>right) hand numbness and frequently dropping objects with the left hand for a month now. Patient denies neck pain. Patient denies left shoulder pain in the office today. Patient denies radicular symptoms down either upper extremity. Patient has had a walker since 2003. Patient fell 2 x last month, the first was when going up stairs, and when getting out of the car, but is unsure why she fell the second time. Patient does not feel like her balance is worse than usual. Patient denies cervical surgery, injection, PT, or chiropractic care. Patient says she has had 4 surgeries on her left knee. Patient has an EMG scheduled on 1/17/2023, doctor unknown. Patient has an appointment with Dr. Mickie Lockett scheduled on 12/28/2022. Patient reports she might have had a mini CVA in the past. Patient is taking eliquis. Patient had labs done on 10/26/2022 with a GFR 46. PmHx of CAD with Bypass graft, HTN, pace maker/ defibrillator placement, A-fib, CHF, depression, former smoker. Note from Dr. Mine Blackwell dated 12/1/2022 indicating patient was seen with c/o has an EMG scheduled soon. reffered to me for cervical radi and kami for rotator cuff. XR Left shoulder dated 11/4/2022 films not independently reviewed by me. Per report, No acute osseous abnormality. Osteoarthritic degenerative chnages involving the glenohumeral and acromioclavicular joints. narrowing of the acroihumeral joint space likely signified underlying rotaotor cuff degenerative ane/or tear. Cervical spine XR dated 11/4/2022 films independently reviewed by me.  Per report,straightening of the normal cervical lordosis. solid osseous fusion of C2-3. Diffuse cervical spondylosis, as above. large prominent anterior anterior flowing osteophytes/syndesmophytes throught the cervical spine, greatest at C3-4. The patient is RHD.  reviewed. Body mass index is 20.52 kg/m². PCP: Vitor Moran MD    Past Medical History:   Diagnosis Date    Arthritis     Automatic implantable cardiac defibrillator in situ     Coronary atherosclerosis of native coronary artery     CABG 10/02 with LIMA to LAD and SVG to RCA    Depression     Essential hypertension     Headache(784.0)     Other and unspecified hyperlipidemia     9/12 Low density lipoproteins are at goal, triglycerides are at goal, high density lipoproteins are at goal.    Postsurgical aortocoronary bypass status 2002    Presence of permanent cardiac pacemaker-DDD 11/6/2015    OFFICE CHECK : 8/14 nl function, +AHR rare(changed detection to over 150/mt); 11/15 nl function     Unspecified arthropathy, lower leg     S/P Left knee replacement. Had repeat surgery's for her left knee. UTI (urinary tract infection)           Past Surgical History:   Procedure Laterality Date    HX CORONARY ARTERY BYPASS GRAFT  10/2002    HX ORTHOPAEDIC  816424    Reduced two component revision using the Sigma System. Lateral release. Suture anchor repair of medial collateral ligament. HX ORTHOPAEDIC  [id number]    Left knee arthrofibrosis, status post significant scarring, status post left total knee replacement    ME CARDIAC SURG PROCEDURE UNLIST  11/09    gen change-obici    VASCULAR SURGERY PROCEDURE UNLIST  2005    Aorto biffem. Dr. Bobby Hein         Social History     Tobacco Use    Smoking status: Former     Packs/day: 0.05     Years: 6.00     Pack years: 0.30     Types: Cigarettes     Start date: 2015    Smokeless tobacco: Never    Tobacco comments:     pt states she do not smoke every day   Substance Use Topics    Alcohol use: No       Work status:  The patient is retired. Marital status: N/A. Current Outpatient Medications   Medication Sig Dispense Refill    aspirin 81 mg chewable tablet chew and swallow 1 tablet by mouth once daily      rosuvastatin (CRESTOR) 40 mg tablet TAKE 1 TABLET BY MOUTH AT  NIGHT 90 Tablet 3    apixaban (Eliquis) 5 mg tablet take 1 tablet by mouth twice a day 180 Tablet 3    ezetimibe (ZETIA) 10 mg tablet Take 1 Tablet by mouth daily. 90 Tablet 2    amLODIPine (NORVASC) 10 mg tablet Take 1 Tablet by mouth daily. 90 Tablet 3    albuterol (PROVENTIL HFA, VENTOLIN HFA, PROAIR HFA) 90 mcg/actuation inhaler Take 2 Puffs by inhalation as needed. multivitamin (ONE A DAY) tablet Take 1 Tab by mouth daily. cyanocobalamin (VITAMIN B-12) 1,000 mcg sublingual tablet Take 1,000 mcg by mouth. Every other friday      acetaminophen (TYLENOL) 500 mg tablet Take  by mouth every six (6) hours as needed for Pain.      torsemide (DEMADEX) 20 mg tablet       sotaloL (BETAPACE) 80 mg tablet Take 1 Tablet by mouth two (2) times a day. 60 Tablet 2    doxazosin (CARDURA) 8 mg tablet Take 1 Tablet by mouth two (2) times a day. (Patient not taking: No sig reported) 180 Tablet 3       Allergies   Allergen Reactions    Losartan Angioedema     Tongue swelling but not on lips            Family History   Problem Relation Age of Onset    OSTEOARTHRITIS Other         not specified    Hypertension Other         not specified    Sudden Death Neg Hx     Heart Attack Neg Hx          REVIEW OF SYSTEMS  Constitutional symptoms: Negative  Eyes: Negative  Ears, Nose, Throat, and Mouth: Negative  Cardiovascular: Negative  Respiratory: Negative  Genitourinary: Negative  Integumentary (Skin and/or breast): Negative  Musculoskeletal: Positive for bilateral hand numbness and tingling (left hand>right hand). Extremities: Negative for edema.   Endocrine/Rheumatologic: Negative  Hematologic/Lymphatic: Negative  Allergic/Immunologic: Negative  Psychiatric: Negative       PHYSICAL EXAMINATION  Visit Vitals  Pulse 60   Temp 97.2 °F (36.2 °C) (Temporal)   Ht 5' 10\" (1.778 m)   Wt 143 lb (64.9 kg)   SpO2 99%   BMI 20.52 kg/m²       CONSTITUTIONAL: NAD, A&O x 3  HEART: Regular rate and rhythm  GASTROINTESTINAL: Positive bowel sounds, soft, nontender, and nondistended  LUNGS: Clear to auscultation bilaterally. SKIN: Negative for rash. RANGE OF MOTION: The patient has full passive range of motion in all four extremities. SENSATION: Decreased sensation to light touch on the left hand all digits. Otherwise, sensation is intact to light touch throughout. MOTOR:   Straight Leg Raise: Negative, bilateral  Quick: Negative, bilateral  Tandem Gait:  not tested because patient ambulates with rolling walker    Deep tendon reflexes are 2 at the left biceps and trace at the right biceps, 2 at the right brachioradialis and trace at the left brachioradialis, and 2 at the right triceps and trace at the left triceps. Deep tendon reflexes are 0 at the right knee and 1 at the left knee, and 0 at the right knee and 1 at the left ankles. Ambulates with a standard rolling walker without seat. Shoulder AB/Flex Elbow Flex Wrist Ext Elbow Ext Wrist Flex Hand Intrin Tone   Right +4/5 +4/5 +4/5 +4/5 +4/5 +4/5 +4/5   Left +4/5 +4/5 +4/5 +4/5 +4/5 +4/5 +4/5              Hip Flex Knee Ext Knee Flex Ankle DF GTE Ankle PF Tone   Right +4/5 +4/5 +4/5 +4/5 +4/5 +4/5 +4/5   Left +4/5 +4/5 +4/5 +4/5 +4/5 +4/5 +4/5       ASSESSMENT   Diagnoses and all orders for this visit:    1. Paresthesia of hand, bilateral    2. Cervical spondylosis without myelopathy    3. DDD (degenerative disc disease), cervical         IMPRESSIONS/RECOMMENDATIONS:  Patient presents today with c/o bilateral Left>right) hand numbness and frequently dropping objects with the left hand for a month now.  I believe her symptoms are not consistent with cervical spine pathology, due to bilateral hand paresthesia that does not radiate down the arms to the hands, and no neck pain. Multiple treatment options were discussed. I recommended she not have any treatment done for her neck until the EMG on 1/17/2023. I asked her to asked the doctor that she sees for the EMG to send me a copy of the report. I asked her to bring her defibrillator card to the next office visit. I advised her to let me know if she has any worsening neck pain. Patient is neurologically intact. I will see the patient back after the EMG on 1/17/2022 or earlier if needed. Written by Dontrell Herrera, as dictated by Sultana Brian MD.  I, Dr. Sultana Brian, examined the patient, reviewed and agree with the note.

## 2023-01-03 DIAGNOSIS — I10 ESSENTIAL HYPERTENSION, BENIGN: ICD-10-CM

## 2023-01-03 DIAGNOSIS — I48.0 PAROXYSMAL ATRIAL FIBRILLATION (HCC): ICD-10-CM

## 2023-01-04 RX ORDER — EZETIMIBE 10 MG/1
10 TABLET ORAL DAILY
Qty: 90 TABLET | Refills: 3 | Status: SHIPPED | OUTPATIENT
Start: 2023-01-04

## 2023-01-25 ENCOUNTER — OFFICE VISIT (OUTPATIENT)
Dept: ORTHOPEDIC SURGERY | Age: 73
End: 2023-01-25
Payer: MEDICARE

## 2023-01-25 VITALS
OXYGEN SATURATION: 94 % | WEIGHT: 140 LBS | BODY MASS INDEX: 20.09 KG/M2 | RESPIRATION RATE: 17 BRPM | TEMPERATURE: 97 F | HEART RATE: 58 BPM

## 2023-01-25 DIAGNOSIS — M75.102 ROTATOR CUFF SYNDROME OF LEFT SHOULDER: ICD-10-CM

## 2023-01-25 DIAGNOSIS — M19.012 PRIMARY OSTEOARTHRITIS OF LEFT SHOULDER: Primary | ICD-10-CM

## 2023-01-25 NOTE — PROGRESS NOTES
Patient: Vesta Jarrell                MRN: [id number]       SSN: [id number]  YOB: 1950        AGE: 74 y.o. SEX: female  Body mass index is 20.09 kg/m². PCP: Arun Vieira MD  01/25/23    CHIEF COMPLAINT: Left shoulder pain     HPI: Vesta Jarrell is a 74 y.o. female patient who presents to the office today with left shoulder pain. However today she says she is having very minimal pain. She was in the hospital several months ago. She had x-rays of her left shoulder. She was referred in for left shoulder issues by her primary care doctor. She says her left shoulder is doing well. She does use a walker to ambulate. She mostly reports some numbness and tingling in her left hand occasionally. She has recently had an EMG of her right arm she said. Past Medical History:   Diagnosis Date    Arthritis     Automatic implantable cardiac defibrillator in situ     Coronary atherosclerosis of native coronary artery     CABG 10/02 with LIMA to LAD and SVG to RCA    Depression     Essential hypertension     Headache(784.0)     Other and unspecified hyperlipidemia     9/12 Low density lipoproteins are at goal, triglycerides are at goal, high density lipoproteins are at goal.    Postsurgical aortocoronary bypass status 2002    Presence of permanent cardiac pacemaker-DDD 11/6/2015    OFFICE CHECK : 8/14 nl function, +AHR rare(changed detection to over 150/mt); 11/15 nl function     Unspecified arthropathy, lower leg     S/P Left knee replacement. Had repeat surgery's for her left knee.     UTI (urinary tract infection)        Family History   Problem Relation Age of Onset    OSTEOARTHRITIS Other         not specified    Hypertension Other         not specified    Sudden Death Neg Hx     Heart Attack Neg Hx        Current Outpatient Medications   Medication Sig Dispense Refill    ezetimibe (ZETIA) 10 mg tablet TAKE 1 TABLET BY MOUTH  DAILY 90 Tablet 3    aspirin 81 mg chewable tablet chew and swallow 1 tablet by mouth once daily      torsemide (DEMADEX) 20 mg tablet       rosuvastatin (CRESTOR) 40 mg tablet TAKE 1 TABLET BY MOUTH AT  NIGHT 90 Tablet 3    sotaloL (BETAPACE) 80 mg tablet Take 1 Tablet by mouth two (2) times a day. 60 Tablet 2    apixaban (Eliquis) 5 mg tablet take 1 tablet by mouth twice a day 180 Tablet 3    amLODIPine (NORVASC) 10 mg tablet Take 1 Tablet by mouth daily. 90 Tablet 3    albuterol (PROVENTIL HFA, VENTOLIN HFA, PROAIR HFA) 90 mcg/actuation inhaler Take 2 Puffs by inhalation as needed. multivitamin (ONE A DAY) tablet Take 1 Tab by mouth daily. cyanocobalamin (VITAMIN B-12) 1,000 mcg sublingual tablet Take 1,000 mcg by mouth. Every other friday      acetaminophen (TYLENOL) 500 mg tablet Take  by mouth every six (6) hours as needed for Pain. doxazosin (CARDURA) 8 mg tablet Take 1 Tablet by mouth two (2) times a day. (Patient not taking: No sig reported) 180 Tablet 3       Allergies   Allergen Reactions    Losartan Angioedema     Tongue swelling but not on lips       Past Surgical History:   Procedure Laterality Date    HX CORONARY ARTERY BYPASS GRAFT  10/2002     ORTHOPAEDIC  543871    Reduced two component revision using the Sigma System. Lateral release. Suture anchor repair of medial collateral ligament.  ORTHOPAEDIC  [id number]    Left knee arthrofibrosis, status post significant scarring, status post left total knee replacement    CT UNLISTED PROCEDURE CARDIAC SURGERY  11/09    gen change-obici    CT UNLISTED PROCEDURE VASCULAR SURGERY  2005    Aorto biffem.   Dr. Mindy Osuna History    Marital status:      Spouse name: Not on file    Number of children: Not on file    Years of education: Not on file    Highest education level: Not on file   Occupational History    Not on file   Tobacco Use    Smoking status: Former     Packs/day: 0.05     Years: 6.00     Pack years: 0.30     Types: Cigarettes     Start date: 2015    Smokeless tobacco: Never    Tobacco comments:     pt states she do not smoke every day   Substance and Sexual Activity    Alcohol use: No    Drug use: Never    Sexual activity: Not on file   Other Topics Concern    Not on file   Social History Narrative    Not on file     Social Determinants of Health     Financial Resource Strain: Not on file   Food Insecurity: Not on file   Transportation Needs: Not on file   Physical Activity: Not on file   Stress: Not on file   Social Connections: Not on file   Intimate Partner Violence: Not on file   Housing Stability: Not on file       REVIEW OF SYSTEMS:    14 point review of systems on the intake form is negative except as noted in the HPI    PHYSICAL EXAMINATION:  Visit Vitals  Pulse (!) 58   Temp 97 °F (36.1 °C)   Resp 17   Wt 140 lb (63.5 kg)   SpO2 94%   BMI 20.09 kg/m²     Body mass index is 20.09 kg/m². GENERAL: Alert and oriented x3, in no acute distress, well-developed, well-nourished. HEENT: Normocephalic, atraumatic. Shoulder Examination     R   L  ROM   FF  Full   Full  ER  Full   Full   IR  Full   Full  Rotator Cuff Pain   Supra  -   +   Infra  -   -   Subscap -   -  Crepitus  -   -  Effusion  -   -  Warmth  -   -   Erythema  -   -  Instability  -   -  AC Joint TTP  -   -  Clavicle   Deformity -   -   TTP  -   -  Proximal Humerus   Deformity -   -   TTP  -   -  Deltoid Strength 5   5  Biceps Strength 5   5  Biceps Deformity -   -  Biceps Groove Pain -   -  Impingement Sign -   -       IMAGING:  Imaging read by myself and interpreted as follows:  Previously taken x-rays 2 views of the left shoulder were taken the office today. These appear to show some slight osteoarthritic change glenohumeral joint and possibly a slightly high riding humeral head. ASSESSMENT & PLAN  Diagnosis: Left shoulder early rotator cuff arthropathy    Edgard Hernandez has some left shoulder findings on her x-rays but her examination is completely benign.   She does have some slight weakness but no pain on rotator cuff strength testing. I think this is very normal for someone of her age. I would not recommend any further imaging and certainly would not recommend any surgery if she is not having any pain. If she does start to have some left shoulder issues I will see her back as needed. Prescription medication management discussed. Electronically signed by: Lucio March MD    Note: This note was completed using voice recognition software.   Any typographical/name errors or mistakes are unintentional.

## 2023-01-27 ENCOUNTER — OFFICE VISIT (OUTPATIENT)
Dept: ORTHOPEDIC SURGERY | Age: 73
End: 2023-01-27
Payer: MEDICARE

## 2023-01-27 VITALS — TEMPERATURE: 97.3 F | HEART RATE: 65 BPM | OXYGEN SATURATION: 96 % | RESPIRATION RATE: 17 BRPM

## 2023-01-27 DIAGNOSIS — M47.812 CERVICAL SPONDYLOSIS WITHOUT MYELOPATHY: ICD-10-CM

## 2023-01-27 DIAGNOSIS — M50.30 DDD (DEGENERATIVE DISC DISEASE), CERVICAL: ICD-10-CM

## 2023-01-27 DIAGNOSIS — M54.12 CERVICAL NEURITIS: ICD-10-CM

## 2023-01-27 DIAGNOSIS — R20.2 PARESTHESIA OF HAND, BILATERAL: Primary | ICD-10-CM

## 2023-01-27 DIAGNOSIS — R20.2 PARESTHESIA OF HAND, BILATERAL: ICD-10-CM

## 2023-01-27 DIAGNOSIS — G56.02 LEFT CARPAL TUNNEL SYNDROME: ICD-10-CM

## 2023-01-27 PROCEDURE — G8420 CALC BMI NORM PARAMETERS: HCPCS | Performed by: PHYSICAL MEDICINE & REHABILITATION

## 2023-01-27 PROCEDURE — G8432 DEP SCR NOT DOC, RNG: HCPCS | Performed by: PHYSICAL MEDICINE & REHABILITATION

## 2023-01-27 PROCEDURE — 1101F PT FALLS ASSESS-DOCD LE1/YR: CPT | Performed by: PHYSICAL MEDICINE & REHABILITATION

## 2023-01-27 PROCEDURE — 1123F ACP DISCUSS/DSCN MKR DOCD: CPT | Performed by: PHYSICAL MEDICINE & REHABILITATION

## 2023-01-27 PROCEDURE — G8427 DOCREV CUR MEDS BY ELIG CLIN: HCPCS | Performed by: PHYSICAL MEDICINE & REHABILITATION

## 2023-01-27 PROCEDURE — G8536 NO DOC ELDER MAL SCRN: HCPCS | Performed by: PHYSICAL MEDICINE & REHABILITATION

## 2023-01-27 PROCEDURE — 99213 OFFICE O/P EST LOW 20 MIN: CPT | Performed by: PHYSICAL MEDICINE & REHABILITATION

## 2023-01-27 PROCEDURE — 1090F PRES/ABSN URINE INCON ASSESS: CPT | Performed by: PHYSICAL MEDICINE & REHABILITATION

## 2023-01-27 PROCEDURE — 3017F COLORECTAL CA SCREEN DOC REV: CPT | Performed by: PHYSICAL MEDICINE & REHABILITATION

## 2023-01-27 PROCEDURE — G8400 PT W/DXA NO RESULTS DOC: HCPCS | Performed by: PHYSICAL MEDICINE & REHABILITATION

## 2023-01-27 NOTE — LETTER
1/27/2023    Patient: Hiram Alcantara   YOB: 1950   Date of Visit: 1/27/2023     Roel Soliz MD  [address] [facility] [address]  Via Fax: [phone]    Dear Roel Soliz MD,      Thank you for referring Ms. Emeli Rae to [address] for evaluation. My notes for this consultation are attached. If you have questions, please do not hesitate to call me. I look forward to following your patient along with you.       Sincerely,    Cherise Morfin MD

## 2023-01-27 NOTE — PROGRESS NOTES
[facility]  [address]  [facility], [facility] Darshan Stewart   Phone: [phone]  Fax: [phone]        PROGRESS NOTE      HISTORY OF PRESENT ILLNESS:  The patient is a 74 y.o. female and was seen today for follow up of bilateral Left>right) hand numbness and frequently dropping objects with the left hand for a month now. Patient denies neck pain. Patient denies left shoulder pain in the office today. Patient denies radicular symptoms down either upper extremity. Patient has had a walker since 2003. Patient fell 2 x last month, the first was when going up stairs, and when getting out of the car, but is unsure why she fell the second time. Patient does not feel like her balance is worse than usual. Patient denies cervical surgery, injection, PT, or chiropractic care. Patient says she has had 4 surgeries on her left knee. Patient has an EMG scheduled on 1/17/2023, doctor unknown. Patient has an appointment with Dr. Elaina Santos scheduled on 12/28/2022. Patient reports she might have had a mini CVA in the past. Patient is taking eliquis. Patient had labs done on 10/26/2022 with a GFR 46. The patient is RHD. PmHx of CAD with Bypass graft, HTN, pace maker/ defibrillator placement (reviewing the car it shows that it is not MRI compatible), A-fib, CHF, Stage 3 renal disease, depression, former smoker. Note from Dr. Gerardo Augustin dated 12/1/2022 indicating patient was seen with c/o has an EMG scheduled soon. reffered to me for cervical radi and kami for rotator cuff. XR Left shoulder dated 11/4/2022 films not independently reviewed by me. Per report, No acute osseous abnormality. Osteoarthritic degenerative chnages involving the glenohumeral and acromioclavicular joints. narrowing of the acroihumeral joint space likely signified underlying rotaotor cuff degenerative ane/or tear. Cervical spine XR dated 11/4/2022 films independently reviewed by me.  Per report,straightening of the normal cervical lordosis. solid osseous fusion of C2-3. Diffuse cervical spondylosis, as above. large prominent anterior anterior flowing osteophytes/syndesmophytes throught the cervical spine, greatest at C3-4. At her last clinical appointment, I believed her symptoms are not consistent with cervical spine pathology, due to bilateral hand paresthesia that does not radiate down the arms to the hands, and no neck pain. I recommended she not have any treatment done for her neck until the EMG on 1/17/2023. I asked her to bring her defibrillator card to the next office visit. The patient returns today with pain location and distribution remains unchanged. She rates her pain 0/10, previously 0/10. Patient wears a Carpal tunnel hand splint with some relief, but she still drops things. Patient had CTR, but is unsure how long ago it was. Patient denies recent falls. A LUE EMG dated 1/17/2023 by Dr. Ro Almanzar was suggestive of chronic C6 and C7 radiculopathy, as well as mild leftover signs of prior CTR.  reviewed. There is no height or weight on file to calculate BMI. PCP: Foreign Stoddard MD      Past Medical History:   Diagnosis Date    Arthritis     Automatic implantable cardiac defibrillator in situ     Coronary atherosclerosis of native coronary artery     CABG 10/02 with LIMA to LAD and SVG to RCA    Depression     Essential hypertension     Headache(784.0)     Other and unspecified hyperlipidemia     9/12 Low density lipoproteins are at goal, triglycerides are at goal, high density lipoproteins are at goal.    Postsurgical aortocoronary bypass status 2002    Presence of permanent cardiac pacemaker-DDD 11/6/2015    OFFICE CHECK : 8/14 nl function, +AHR rare(changed detection to over 150/mt); 11/15 nl function     Unspecified arthropathy, lower leg     S/P Left knee replacement. Had repeat surgery's for her left knee.     UTI (urinary tract infection)         Social History     Socioeconomic History    Marital status:      Spouse name: Not on file    Number of children: Not on file    Years of education: Not on file    Highest education level: Not on file   Occupational History    Not on file   Tobacco Use    Smoking status: Former     Packs/day: 0.05     Years: 6.00     Pack years: 0.30     Types: Cigarettes     Start date: 2015    Smokeless tobacco: Never    Tobacco comments:     pt states she do not smoke every day   Substance and Sexual Activity    Alcohol use: No    Drug use: Never    Sexual activity: Not on file   Other Topics Concern    Not on file   Social History Narrative    Not on file     Social Determinants of Health     Financial Resource Strain: Not on file   Food Insecurity: Not on file   Transportation Needs: Not on file   Physical Activity: Not on file   Stress: Not on file   Social Connections: Not on file   Intimate Partner Violence: Not on file   Housing Stability: Not on file       Current Outpatient Medications   Medication Sig Dispense Refill    ezetimibe (ZETIA) 10 mg tablet TAKE 1 TABLET BY MOUTH  DAILY 90 Tablet 3    aspirin 81 mg chewable tablet chew and swallow 1 tablet by mouth once daily      torsemide (DEMADEX) 20 mg tablet       rosuvastatin (CRESTOR) 40 mg tablet TAKE 1 TABLET BY MOUTH AT  NIGHT 90 Tablet 3    sotaloL (BETAPACE) 80 mg tablet Take 1 Tablet by mouth two (2) times a day. 60 Tablet 2    apixaban (Eliquis) 5 mg tablet take 1 tablet by mouth twice a day 180 Tablet 3    amLODIPine (NORVASC) 10 mg tablet Take 1 Tablet by mouth daily. 90 Tablet 3    albuterol (PROVENTIL HFA, VENTOLIN HFA, PROAIR HFA) 90 mcg/actuation inhaler Take 2 Puffs by inhalation as needed. multivitamin (ONE A DAY) tablet Take 1 Tab by mouth daily. cyanocobalamin (VITAMIN B-12) 1,000 mcg sublingual tablet Take 1,000 mcg by mouth. Every other friday      acetaminophen (TYLENOL) 500 mg tablet Take  by mouth every six (6) hours as needed for Pain.       doxazosin (CARDURA) 8 mg tablet Take 1 Tablet by mouth two (2) times a day. (Patient not taking: No sig reported) 180 Tablet 3       Allergies   Allergen Reactions    Losartan Angioedema     Tongue swelling but not on lips          PHYSICAL EXAMINATION    Visit Vitals  Pulse 65   Temp 97.3 °F (36.3 °C)   Resp 17   SpO2 96%       CONSTITUTIONAL: NAD, A&O x 3  SENSATION: Decreased sensation to light touch on the left hand digits 3 and 5 distribution. Otherwise, intact to light touch throughout  NEURO: Erin's is negative bilaterally. RANGE OF MOTION: The patient has full passive range of motion in all four extremities. MOTOR:      Ambulates with a standard rolling walker     Shoulder AB/Flex Elbow Flex Wrist Ext Elbow Ext Wrist Flex Hand Intrin Tone   Right +4/5 +4/5 +4/5 +4/5 +4/5 +4/5 +4/5   Left +4/5 +4/5 +4/5 +4/5 +4/5 +4/5 +4/5     ASSESSMENT   Diagnoses and all orders for this visit:    1. Paresthesia of hand, bilateral    2. DDD (degenerative disc disease), cervical    3. Cervical spondylosis without myelopathy    4. Cervical neuritis    5. Left carpal tunnel syndrome        IMPRESSION AND PLAN:  Patient returns to the office today with c/o bilateral Left>right) hand numbness and frequently dropping objects with the left hand. Multiple treatment options were discussed. I will order a CT cervical spine. I advised patient to bring copies of films to next visit. Patient is neurologically intact. I will see the patient back after CT or earlier if needed. Written by Alley Dumont, as dictated by Storm Schaumann, MD  I examined the patient, reviewed and agree with the note.

## 2023-01-28 DIAGNOSIS — I10 ESSENTIAL HYPERTENSION, BENIGN: ICD-10-CM

## 2023-01-30 RX ORDER — AMLODIPINE BESYLATE 10 MG/1
10 TABLET ORAL DAILY
Qty: 90 TABLET | Refills: 3 | Status: SHIPPED | OUTPATIENT
Start: 2023-01-30

## 2023-03-03 ENCOUNTER — OFFICE VISIT (OUTPATIENT)
Age: 73
End: 2023-03-03

## 2023-03-03 VITALS
WEIGHT: 144 LBS | OXYGEN SATURATION: 100 % | BODY MASS INDEX: 20.62 KG/M2 | SYSTOLIC BLOOD PRESSURE: 169 MMHG | HEIGHT: 70 IN | DIASTOLIC BLOOD PRESSURE: 63 MMHG | HEART RATE: 51 BPM

## 2023-03-03 DIAGNOSIS — E78.2 MIXED HYPERLIPIDEMIA: ICD-10-CM

## 2023-03-03 DIAGNOSIS — I50.42 CHRONIC COMBINED SYSTOLIC AND DIASTOLIC CONGESTIVE HEART FAILURE (HCC): Primary | ICD-10-CM

## 2023-03-03 DIAGNOSIS — I48.0 PAROXYSMAL ATRIAL FIBRILLATION (HCC): ICD-10-CM

## 2023-03-03 DIAGNOSIS — I25.118 ATHEROSCLEROSIS OF NATIVE CORONARY ARTERY OF NATIVE HEART WITH STABLE ANGINA PECTORIS (HCC): ICD-10-CM

## 2023-03-03 DIAGNOSIS — Z95.810 PRESENCE OF AUTOMATIC (IMPLANTABLE) CARDIAC DEFIBRILLATOR: ICD-10-CM

## 2023-03-03 DIAGNOSIS — I10 ESSENTIAL HYPERTENSION, BENIGN: ICD-10-CM

## 2023-03-03 RX ORDER — DOXAZOSIN 2 MG/1
2 TABLET ORAL NIGHTLY
Qty: 90 TABLET | Refills: 1 | Status: SHIPPED | OUTPATIENT
Start: 2023-03-03

## 2023-03-03 ASSESSMENT — PATIENT HEALTH QUESTIONNAIRE - PHQ9
SUM OF ALL RESPONSES TO PHQ QUESTIONS 1-9: 0
1. LITTLE INTEREST OR PLEASURE IN DOING THINGS: 0
SUM OF ALL RESPONSES TO PHQ QUESTIONS 1-9: 0
SUM OF ALL RESPONSES TO PHQ QUESTIONS 1-9: 0
DEPRESSION UNABLE TO ASSESS: FUNCTIONAL CAPACITY MOTIVATION LIMITS ACCURACY
SUM OF ALL RESPONSES TO PHQ QUESTIONS 1-9: 0
2. FEELING DOWN, DEPRESSED OR HOPELESS: 0
SUM OF ALL RESPONSES TO PHQ9 QUESTIONS 1 & 2: 0

## 2023-03-03 ASSESSMENT — ENCOUNTER SYMPTOMS
GASTROINTESTINAL NEGATIVE: 1
SHORTNESS OF BREATH: 1
EYES NEGATIVE: 1

## 2023-03-03 NOTE — PROGRESS NOTES
1. Have you been to the ER, urgent care clinic since your last visit? Hospitalized since your last visit? No      2. Where do you normally have your labs drawn? [facility]    3. Do you need any refills today? Yes    4. Which local pharmacy do you use (enter pharmacy)? [facility]    5. Which mail order pharmacy do you use (enter pharmacy)? No     6. Are you here for surgical clearance and if so who will be doing your     procedure/surgery (care team)?    No

## 2023-03-03 NOTE — PROGRESS NOTES
Estiven Pretty is a 74y.o. year old female. Follow-up of CAD, status post CABG, hypertension, paroxysmal A. fib, status post ICD, hyperlipidemia. 6/20 seen for abnormal stress test which was done in [facility] in March 2024 preop testing for vascular surgery and was found to be abnormal.  She was recently seen 3 weeks ago when she did not mention anything about stress test being done in an outside hospital.    3/2021 patient presents to follow-up for hypertension and lab results  4/22 seen after cardioversion. Staying in sinus rhythm with demand atrial pacing. Feeling well without any new symptoms. 11/2022  Patient seen following admission to [facility] for acute on chronic systolic CHF. She was diuresed and ICD was interrogated with normal function, AFib episodes noted, elevated optivol. He medications were adjusted prior to d/c to home. Patient reports she has not smoked in 8 days. She denies chest pain, shortness of breath, palpitations or edema. 3/3/2023 in the last 3 months she has not had any significant chest pain, edema, dizziness or loss of consciousness. Occasional shortness of breath is noted when she is stressed but usually she is able to do her daily routine at home without any significant symptoms. Review of Systems   Constitutional: Negative. HENT: Negative. Eyes: Negative. Respiratory:  Positive for shortness of breath. Cardiovascular: Negative. Gastrointestinal: Negative. Endocrine: Negative. Genitourinary: Negative. Musculoskeletal: Negative. Neurological: Negative. Psychiatric/Behavioral: Negative. All other systems reviewed and are negative. Physical Exam  Vitals and nursing note reviewed. Constitutional:       Appearance: Normal appearance. HENT:      Head: Normocephalic and atraumatic. Nose: Nose normal.   Eyes:      Conjunctiva/sclera: Conjunctivae normal.   Cardiovascular:      Rate and Rhythm: Normal rate and regular rhythm. Pulses: Normal pulses. Heart sounds: Normal heart sounds. Pulmonary:      Effort: Pulmonary effort is normal.      Breath sounds: Normal breath sounds. Abdominal:      General: Bowel sounds are normal.      Palpations: Abdomen is soft. Musculoskeletal:         General: Normal range of motion. Right lower leg: No edema. Left lower leg: No edema. Comments: Cannot straighten the legs due to contractures at knees and has kyphosis   Skin:     General: Skin is warm and dry. Neurological:      General: No focal deficit present. Mental Status: She is alert and oriented to person, place, and time. Psychiatric:         Mood and Affect: Mood normal.         Behavior: Behavior normal.          Diagnostic Studies:  CARDIOLOGY STUDIES 10/5/2017   Pharmacological Nuclear Stress Test Result mild small infbasal ischemia, 73%EF   Some recent data might be hidden     3/20 nuclear stress test  TANA REST/STRESS MULTIPLE [facility]  Component Name Value Ref Range   EF Nuc 52     Result Impression    THIS MYOCARDIAL PERFUSION STUDY IS ABNORMAL   THIS IS A HIGH RISK STUDY   ABNORMAL NUCLEAR STUDY SUGGESTIVE OF TWO-VESSEL DISEASE     ON INITIAL STRESS IMAGES THERE IS A MODERATE TO LARGE AREA OF DIMINISHED UPTAKE IN THE ISOTOPE   THROUGHOUT THE ANTERIOR SEPTAL AND THE LATERAL WALL     ON THE REST IMAGES THERE IS ENHANCED UPTAKE THROUGHOUT THE ANTERIOR SEPTAL AND LATERAL WALL     THE GATED ACQUISITION SHOWS LATERAL WALL HYPOKINESIS AND AN EJECTION FRACTION ESTIMATED AT   50%     THE ABOVE FINDINGS ARE CONSISTENT WITH TWO-VESSEL ISCHEMIA INVOLVING THE PROXIMAL LAD AND   [facility] [address] ARTERY   8/21 echo  Interpretation Summary    LV: Estimated LVEF is 40 - 45%. Normal cavity size. Moderate concentric hypertrophy. Mild hypokinesis of the mid inferior and mid inferoseptal wall(s). Severe hypokinesis of the basal inferior and basal inferoseptal wall(s).  Severe (grade 3) left ventricular diastolic dysfunction. LA: Moderately dilated left atrium. Left Atrium volume index is 48.02 mL/m2. RV: Pacer/ICD present. RA: Mildly dilated right atrium. MV: Mitral valve thickening. Mitral annular calcification. TV: Mild tricuspid valve regurgitation is present. Right Ventricular Arterial Pressure (RVSP) is 35 mmHg. Pulmonary hypertension not suggested by Doppler findings. PV: Mild pulmonic valve regurgitation is present. Comparison Study Information    Prior Study    When compared to the previous study from 1/27/09, the overall left ventricular ejection fraction now appears mildly reduced, there is now moderate left ventricular hypertrophy, the wall motion abnormalities appear to be new, and there is now bi atrial enlargement. 10/2022  Echo  Narrative    CONCLUSIONS     * Wall motion abnormalities present with a reduced biplane ejection fraction   of 37%. * Mildly dilated left ventricle with mild posterior hypertrophy. * Left ventricular diastolic function: Grade II diastolic dysfunction. * Mild pulmonary hypertension, estimated pulmonary arterial systolic   pressure is 45 mmHg. * Dilated right ventricular size with preserved systolic function. * Left atrial chamber is mildly enlarged with a left atrial volume index of   36.62 ml/m^2. * There is mild tricuspid valve regurgitation. * No other hemodynamically significant valvular disease. * Dilated inferior vena cava with >50% collapse upon inspiration consistent   with elevated right atrial pressure, 8 mmHg. * No masses, shunts, or thrombi. Comparison     * Compared to prior study from 8/16/2021, the right atrium is no longer   dilated, and the pulmonary hypertension is a new finding.        ASSESSMENT & PLAN    Lab Results   Component Value Date    CHOL 131 09/20/2022    CHOL 125 07/07/2021    CHOL 121 03/19/2021     Lab Results   Component Value Date    TRIG 58 09/20/2022    TRIG 62 07/07/2021    TRIG 69 03/19/2021     Lab Results   Component Value Date    HDL 57 09/20/2022    HDL 2.3 09/20/2022    HDL 49 07/07/2021    HDL 46 03/19/2021     Lab Results   Component Value Date    LDLCALC 62 09/20/2022    LDLCALC 63 07/07/2021    LDLCALC 61 03/19/2021     Lab Results   Component Value Date    LABVLDL 12 09/20/2022    VLDL 13 07/07/2021    VLDL 14 03/19/2021     No results found for: CHOLHDLRATIO    AFib / Flutter seen on interrogation of ICD  Started Eliquis 5 mg / BID        BiV ICD: gen change 11/09; 3/17  CARE LINK 10/10; 8/12; 4/13; 8/13; 2/16; 5/16; 8/16  nl function, nl volume; 2/17 nl function; JOI; 8/17; 1/19; 11/19 nl function, nl volume; 2/20 normal function, normal volume, PAF; 6/20 normal function, recent increase in volume; 3/21 normal function, high-volume, frequent PAF; 6/21 normal function, nearly persistent A. fib, intermittent high-volume;  OFFICE CHECK : 8/14 nl function, +AHR rare(changed detection to over 150/mt); 11/15; 11/16; 3/17 nl function, nl vol; 4/18 nl function, frequent AHR(likely AFlutter), nl volume; Normal function, normal volume, AT/AF episodes are far field sensing events;  4/22 normal function, normal volume, permanent AF for many months now. Delete that; 3/23 normal function, normal volume, rare A-fib-short;      6/1/2020 blood pressure is elevated. Increase Cardura to 8 mg at bedtime. Patient cannot check her blood pressures at home even though she has a machine. Continue Eliquis due to frequent AT/A. fib on ICD interrogation. Follow-up [facility] every 3 months. Office check in 6 months  Stable CAD. Continue present medications. She has developed mild edema on the right leg but recently had a lot of stress and likely dietary indiscretion due to sister in the hospital and eventually dying in the hospital.  Patient trying to stop smoking and she was congratulated for that. Hopefully she will discontinue very soon. She has cut down the smoking significantly.     Lasix helping well but she does not like to take it daily. 2/19/2021 CAD stable. Continue same medications  BP is elevated. Increase doxazosin to 8 mg twice daily. Follow home chart and visit with nurse practitioner 1 week approximately to check on the pressure. Check [facility] ASAP, preferably through today. Continue Eliquis for paroxysmal A. fib. Routine labs to check liver and lipids  Tobacco cessation discussed and reinforced again. She says she smokes 3 cigarettes a day and does not inhale. 3/25/2021  Blood pressure has improved with recent increase in doxazosin. Home chart reviewed blood pressure consistently 130s over 80s. CAD is stable with no symptoms presently, continue current medications. Recent LFT and lipids reviewed and discussed with patient lipids at goal.  She reports stop smoking cigarettes on March 19 and for this was congratulated       6/21/2021 has CHF exacerbation again like last year. Was in ER and improved with Lasix. Still has edema and needs to continue. Check echo. Reduce amlodipine so she can have better diuresis. CAD stable. She is cutting down her tobacco and was congratulated. Hopefully she can just let it go now. Labs as ordered. 9/27/2021 CHF is compensated now NYHA class II. CAD stable without any significant angina. She has almost quit smoking and smokes 1 cigarette a week as she says. She was congratulated for that. Blood pressure is minimally elevated but she is not sure whether she takes doxazosin at home she will call back and then we can adjust the medications. ICD is functioning normally but needs to be interrogated again. Lipids are well controlled. Echo has shown mild reduction in LV function. Dilated biatrial sizes. Medical treatment to continue. 4/1/2022 CAD stable. CHF is worsening delete that CHF is stable. Blood pressure is elevated. Increase Norvasc and follow home chart. Patient seems to be in permanent A. fib for few months now. Discussed with her and options will be to start sotalol as inpatient or amiodarone as outpatient. My first choice will be sotalol. She will think about it and let us know early next week. Tobacco cessation reinforced. Addendum: While sitting in the office she change her mind and is ready to get admitted. We will admit her on Sunday with elective cardioversion Tuesday. 4/22 staying in sinus rhythm on sotalol. QT interval is increased but acceptable and will be followed closely. Next EKG in 1 week. Blood pressure is controlled well. CAD stable. 8/5/2022 CAD stable. Blood pressure is elevated. Patient is not taking hydralazine by misunderstanding and taking Cardura only once a day. We will continue Cardura once a day but explained to patient to take hydralazine 25 mg 3 times a day. Check BP chart at home. Tobacco cessation reinforced again. ICD function is normal but she continues to have paroxysmal short runs of A. fib. Continue anticoagulation. Lipids were controlled last year and will be followed again this year. 11/2022  Recent admission for acute on chronic HFrEF. Echocardiogram reviewed and discussed with patient EF 37% mild pulmonary hypertension TR no other hemodynamically significant valvular disease. Per notes ICD was interrogated with normal function episodes of A. fib noted with elevated OptiVol, patient was discharged with medications adjusted and she continues with torsemide and is followed by telehealth. She reports blood pressure and weight has been stable since discharge. We will continue current medications. Patient has not smoked in 8 days for which she was congratulated and encouraged to continue the good work. Francisco Garrett was seen today for follow-up.     Diagnoses and all orders for this visit:    Chronic combined systolic and diastolic congestive heart failure (HCC)  -     apixaban (ELIQUIS) 5 MG TABS tablet; take 1 tablet by mouth twice a day  -     dapagliflozin (FARXIGA) 10 MG tablet; Take 1 tablet by mouth every morning  -     Basic Metabolic Panel; Future  -     Hepatic Function Panel; Future  -     CBC; Future  -     TSH; Future    Atherosclerosis of native coronary artery of native heart with stable angina pectoris (HCC)    Essential hypertension, benign  -     doxazosin (CARDURA) 2 MG tablet; Take 1 tablet by mouth nightly    Presence of automatic (implantable) cardiac defibrillator  -     GRAM EVAL (IN PERSON) IMPLANT DEVICE,CARDVERT/DEFIB,MULT LEAD  -     IMPLANTABLE CARDIOVASULAR MORENA SYST    Paroxysmal atrial fibrillation (HCC)    Mixed hyperlipidemia  -     Lipid Panel; Future        Pertinent laboratory and test data reviewed and discussed with patient. See patient instructions also for other medical advice given    Medications Discontinued During This Encounter   Medication Reason    doxazosin (CARDURA) 8 MG tablet Patient Discharge    aspirin 81 MG chewable tablet Therapy completed    apixaban (ELIQUIS) 5 MG TABS tablet REORDER       Follow-up and Dispositions    Return in about 3 months (around 6/3/2023), or if symptoms worsen or fail to improve, for post test.           Return to ER if any significant CP not relieved by s/l NTG, severe SOB, severe palpitations, loss of consciousness    3/3/2023 CAD is clinically stable. Blood pressure is elevated. Alpha blockers were discontinued in the hospital which will be restarted at a lower dose. Check labs including lipids and electrolytes. She uses torsemide as needed edema which is acceptable. Add [place] for cardiomyopathy. She has quit smoking 1 which she was congratulated.

## 2023-03-20 ENCOUNTER — TELEPHONE (OUTPATIENT)
Age: 73
End: 2023-03-20

## 2023-03-20 ENCOUNTER — OFFICE VISIT (OUTPATIENT)
Age: 73
End: 2023-03-20
Payer: MEDICARE

## 2023-03-20 VITALS
HEART RATE: 62 BPM | TEMPERATURE: 97.6 F | WEIGHT: 148 LBS | BODY MASS INDEX: 21.24 KG/M2 | OXYGEN SATURATION: 95 % | RESPIRATION RATE: 20 BRPM

## 2023-03-20 DIAGNOSIS — R20.2 PARESTHESIA OF BOTH HANDS: Primary | ICD-10-CM

## 2023-03-20 DIAGNOSIS — M47.812 CERVICAL SPONDYLOSIS WITHOUT MYELOPATHY: ICD-10-CM

## 2023-03-20 DIAGNOSIS — M48.02 SPINAL STENOSIS OF CERVICAL REGION: ICD-10-CM

## 2023-03-20 DIAGNOSIS — M50.30 DDD (DEGENERATIVE DISC DISEASE), CERVICAL: ICD-10-CM

## 2023-03-20 DIAGNOSIS — M54.12 CERVICAL NEURITIS: ICD-10-CM

## 2023-03-20 PROCEDURE — 99214 OFFICE O/P EST MOD 30 MIN: CPT | Performed by: PHYSICAL MEDICINE & REHABILITATION

## 2023-03-20 PROCEDURE — 1123F ACP DISCUSS/DSCN MKR DOCD: CPT | Performed by: PHYSICAL MEDICINE & REHABILITATION

## 2023-03-20 NOTE — PROGRESS NOTES
Depression     Essential hypertension     Headache(784.0)     Other and unspecified hyperlipidemia     9/12 Low density lipoproteins are at goal, triglycerides are at goal, high density lipoproteins are at goal.    Postsurgical aortocoronary bypass status 2002    Presence of permanent cardiac pacemaker 11/6/2015    OFFICE CHECK : 8/14 nl function, +AHR rare(changed detection to over 150/mt); 11/15 nl function     Unspecified arthropathy, lower leg     S/P Left knee replacement. Had repeat surgery's for her left knee. UTI (urinary tract infection)        Social History     Socioeconomic History    Marital status:      Spouse name: None    Number of children: None    Years of education: None    Highest education level: None   Tobacco Use    Smoking status: Former     Packs/day: 0.05     Types: Cigarettes     Start date: 1/1/2015    Smokeless tobacco: Never   Substance and Sexual Activity    Alcohol use: No    Drug use: Never       Current Outpatient Medications   Medication Sig Dispense Refill    apixaban (ELIQUIS) 5 MG TABS tablet take 1 tablet by mouth twice a day 60 tablet 3    dapagliflozin (FARXIGA) 10 MG tablet Take 1 tablet by mouth every morning 30 tablet 5    doxazosin (CARDURA) 2 MG tablet Take 1 tablet by mouth nightly 90 tablet 1    amLODIPine (NORVASC) 10 MG tablet Take 10 mg by mouth daily      Cyanocobalamin 1000 MCG SUBL Take 1,000 mcg by mouth      ezetimibe (ZETIA) 10 MG tablet Take 10 mg by mouth daily      rosuvastatin (CRESTOR) 40 MG tablet TAKE 1 TABLET BY MOUTH AT  NIGHT      sotalol (BETAPACE) 80 MG tablet Take 80 mg by mouth 2 times daily      torsemide (DEMADEX) 20 MG tablet ceived the following from [facility]: Outside name: torsemide (DEMADEX) 20 mg tablet       No current facility-administered medications for this visit.        Allergies   Allergen Reactions    Losartan Angioedema     Tongue swelling but not on lips          PHYSICAL EXAMINATION    Pulse 62

## 2023-03-20 NOTE — TELEPHONE ENCOUNTER
Patient called and stated that her bp is 157/72 after leaving the Dr she also said that Dr Bolivar Shah told her to come into the office to get it checked she just wanted to know if she need to come in this week to get it checked due to her just having it checked at the other Dr.

## 2023-03-20 NOTE — TELEPHONE ENCOUNTER
Patient will come in office on Thursday to have BP check. She voices understanding and acceptance of this advice and will call back if any further questions or concerns.

## 2023-04-21 ENCOUNTER — TELEPHONE (OUTPATIENT)
Age: 73
End: 2023-04-21

## 2023-04-21 NOTE — TELEPHONE ENCOUNTER
Called and spoke to patient regarding BP. Please talk to the nurse that she goes to her house and make sure her equipment is fine and if the home blood pressures are as noted by the nurse, continue the same medications.   Requested nurse to check blood pressure every time she comes and send it to our office

## 2023-04-21 NOTE — TELEPHONE ENCOUNTER
Please talk to the nurse that she goes to her house and make sure her equipment is fine and if the home blood pressures are as noted by the nurse, continue the same medications.   Requested nurse to check blood pressure every time she comes and send it to our office

## 2023-04-21 NOTE — TELEPHONE ENCOUNTER
Patient has been coming in office to have BP checked d/t not having a BP machine at home. Patient BP has been running high every time she come in office. BP as follow:    4/7/23 /78 Hr 55  4/10/23 /78 HR 55  Patient has nurse to come by house on 4/13/23 and BP was 120/60 HR 60  Patient in office on 4/14/23 BP ws 156/89 HR 62   Please advise if any changes.

## 2023-05-01 ENCOUNTER — OFFICE VISIT (OUTPATIENT)
Age: 73
End: 2023-05-01
Payer: MEDICARE

## 2023-05-01 VITALS
HEIGHT: 70 IN | HEART RATE: 57 BPM | OXYGEN SATURATION: 96 % | BODY MASS INDEX: 20.9 KG/M2 | TEMPERATURE: 97.2 F | WEIGHT: 146 LBS | RESPIRATION RATE: 16 BRPM

## 2023-05-01 DIAGNOSIS — M47.812 CERVICAL SPONDYLOSIS WITHOUT MYELOPATHY: ICD-10-CM

## 2023-05-01 DIAGNOSIS — R26.89 LOSS OF BALANCE: Primary | ICD-10-CM

## 2023-05-01 DIAGNOSIS — M54.12 CERVICAL NEURITIS: ICD-10-CM

## 2023-05-01 DIAGNOSIS — M50.30 DDD (DEGENERATIVE DISC DISEASE), CERVICAL: ICD-10-CM

## 2023-05-01 DIAGNOSIS — M48.02 SPINAL STENOSIS OF CERVICAL REGION: ICD-10-CM

## 2023-05-01 DIAGNOSIS — R20.2 PARESTHESIA OF BOTH HANDS: ICD-10-CM

## 2023-05-01 PROCEDURE — 1123F ACP DISCUSS/DSCN MKR DOCD: CPT | Performed by: PHYSICAL MEDICINE & REHABILITATION

## 2023-05-01 PROCEDURE — 99213 OFFICE O/P EST LOW 20 MIN: CPT | Performed by: PHYSICAL MEDICINE & REHABILITATION

## 2023-05-01 ASSESSMENT — PATIENT HEALTH QUESTIONNAIRE - PHQ9
2. FEELING DOWN, DEPRESSED OR HOPELESS: 0
SUM OF ALL RESPONSES TO PHQ QUESTIONS 1-9: 0
1. LITTLE INTEREST OR PLEASURE IN DOING THINGS: 0
SUM OF ALL RESPONSES TO PHQ QUESTIONS 1-9: 0
SUM OF ALL RESPONSES TO PHQ QUESTIONS 1-9: 0
SUM OF ALL RESPONSES TO PHQ9 QUESTIONS 1 & 2: 0
SUM OF ALL RESPONSES TO PHQ QUESTIONS 1-9: 0

## 2023-05-01 NOTE — PROGRESS NOTES
acromioclavicular joints. narrowing of the acroihumeral joint space likely signified  underlying rotaotor cuff degenerative ane/or tear. Cervical spine XR dated 11/4/2022 films independently reviewed by me. Per report, straightening of the normal cervical lordosis. solid osseous fusion of C2-3. Diffuse cervical spondylosis, as above. large prominent anterior anterior flowing osteophytes/syndesmophytes throught the cervical spine, greatest at C3-4. A LUE EMG dated 1/17/2023 by Dr. Felton Butler was suggestive of chronic C6 and C7 radiculopathy, as well as mild leftover signs of prior CTR. Cervical spine CT w/o dated 3/1/2023 films independently reviewed. Per report, Extensive multilevel degenerative disc and degenerative facet disease. Central canal stenosis appears to be severe at C4-5. Multilevel foraminal narrowing is present and appears to be severe on the left at C4-5 and C6-7 with severe right foraminal narrowing at C5-6. Large bridging osteophytes, most pronounced at C3-4 and C4-5. These could result in dysphagia. Enlargement and heterogeneity of the thyroid gland with thyroid nodularity. Please refer to the prior thyroid ultrasound from 1/31/2022 for further evaluation and recommendations. No acute fracture or subluxation. At her last clinical appointment, Patient says she has used a walker since 2003 and has not seen a change in her dexterity or balance since the last office visit. It would be hard to recommend surgical intervention at this time. Patient is not interested in surgery or injection at this time. Patient says if her dropping or LOB were to stay the same then she would not be interested in surgery. I will order some occupational therapy for her impairments in manual dexterity of the LUE. Patient has been instructed to notify the office if her Lob or impairments in her manual dexterity worsen before the next office visit.     The patient returns today with bilateral hand numbness, left hand involves all

## 2023-06-05 ENCOUNTER — OFFICE VISIT (OUTPATIENT)
Age: 73
End: 2023-06-05
Payer: MEDICARE

## 2023-06-05 VITALS
HEART RATE: 60 BPM | DIASTOLIC BLOOD PRESSURE: 60 MMHG | WEIGHT: 147 LBS | HEIGHT: 70 IN | SYSTOLIC BLOOD PRESSURE: 156 MMHG | BODY MASS INDEX: 21.05 KG/M2 | OXYGEN SATURATION: 99 %

## 2023-06-05 DIAGNOSIS — Z95.810 PRESENCE OF AUTOMATIC (IMPLANTABLE) CARDIAC DEFIBRILLATOR: ICD-10-CM

## 2023-06-05 DIAGNOSIS — I48.0 PAROXYSMAL ATRIAL FIBRILLATION (HCC): ICD-10-CM

## 2023-06-05 DIAGNOSIS — I10 ESSENTIAL HYPERTENSION, BENIGN: ICD-10-CM

## 2023-06-05 DIAGNOSIS — E78.2 MIXED HYPERLIPIDEMIA: ICD-10-CM

## 2023-06-05 DIAGNOSIS — Z95.1 PRESENCE OF AORTOCORONARY BYPASS GRAFT: ICD-10-CM

## 2023-06-05 DIAGNOSIS — I25.118 ATHEROSCLEROSIS OF NATIVE CORONARY ARTERY OF NATIVE HEART WITH STABLE ANGINA PECTORIS (HCC): ICD-10-CM

## 2023-06-05 DIAGNOSIS — Z71.6 TOBACCO ABUSE COUNSELING: ICD-10-CM

## 2023-06-05 DIAGNOSIS — I50.42 CHRONIC COMBINED SYSTOLIC AND DIASTOLIC CONGESTIVE HEART FAILURE (HCC): Primary | ICD-10-CM

## 2023-06-05 PROCEDURE — 1123F ACP DISCUSS/DSCN MKR DOCD: CPT | Performed by: INTERNAL MEDICINE

## 2023-06-05 PROCEDURE — 3078F DIAST BP <80 MM HG: CPT | Performed by: INTERNAL MEDICINE

## 2023-06-05 PROCEDURE — 99214 OFFICE O/P EST MOD 30 MIN: CPT | Performed by: INTERNAL MEDICINE

## 2023-06-05 PROCEDURE — 3077F SYST BP >= 140 MM HG: CPT | Performed by: INTERNAL MEDICINE

## 2023-06-05 RX ORDER — TORSEMIDE 20 MG/1
10 TABLET ORAL DAILY PRN
Qty: 30 TABLET | Refills: 0
Start: 2023-06-05

## 2023-06-05 RX ORDER — DOXAZOSIN 8 MG/1
8 TABLET ORAL NIGHTLY
Qty: 90 TABLET | Refills: 3 | Status: SHIPPED | OUTPATIENT
Start: 2023-06-05

## 2023-06-05 ASSESSMENT — ENCOUNTER SYMPTOMS
RESPIRATORY NEGATIVE: 1
EYES NEGATIVE: 1
GASTROINTESTINAL NEGATIVE: 1

## 2023-06-05 ASSESSMENT — PATIENT HEALTH QUESTIONNAIRE - PHQ9
1. LITTLE INTEREST OR PLEASURE IN DOING THINGS: 0
SUM OF ALL RESPONSES TO PHQ QUESTIONS 1-9: 0
SUM OF ALL RESPONSES TO PHQ9 QUESTIONS 1 & 2: 0
SUM OF ALL RESPONSES TO PHQ QUESTIONS 1-9: 0
2. FEELING DOWN, DEPRESSED OR HOPELESS: 0
SUM OF ALL RESPONSES TO PHQ QUESTIONS 1-9: 0
SUM OF ALL RESPONSES TO PHQ QUESTIONS 1-9: 0
DEPRESSION UNABLE TO ASSESS: FUNCTIONAL CAPACITY MOTIVATION LIMITS ACCURACY

## 2023-06-05 NOTE — PROGRESS NOTES
1. Have you been to the ER, urgent care clinic since your last visit? Hospitalized since your last visit? No      2. Where do you normally have your labs drawn? [facility]    3. Do you need any refills today? No    4. Which local pharmacy do you use (enter pharmacy)? [facility]    5. Which mail order pharmacy do you use (enter pharmacy)? No     6. Are you here for surgical clearance and if so who will be doing your     procedure/surgery (care team)?    No
(implantable) cardiac defibrillator    Paroxysmal atrial fibrillation (HCC)  -     EKG 12 Lead    Mixed hyperlipidemia    Presence of aortocoronary bypass graft    Tobacco abuse counseling        Pertinent laboratory and test data reviewed and discussed with patient. See patient instructions also for other medical advice given    Medications Discontinued During This Encounter   Medication Reason    torsemide (DEMADEX) 20 MG tablet REORDER    doxazosin (CARDURA) 4 MG tablet REORDER       Follow-up and Dispositions    Return in about 3 months (around 9/5/2023), or if symptoms worsen or fail to improve, for with pacer/ICD check. Return to ER if any significant CP not relieved by s/l NTG, severe SOB, severe palpitations, loss of consciousness    6/5/2023 CAD is clinically stable. CHF is compensated NYHA class II. Blood pressure is mildly elevated. Increase doxazosin to 8 mg at bedtime and follow the home BPs but patient cannot check at home. She will come to our office in a week. She has not been smoking for which she was congratulated. ICD function normal in 3/23 and will be checked in the next visit since she does not have a working [facility].

## 2023-06-23 RX ORDER — ROSUVASTATIN CALCIUM 40 MG/1
TABLET, COATED ORAL
Qty: 90 TABLET | Refills: 3 | Status: SHIPPED | OUTPATIENT
Start: 2023-06-23

## 2023-07-11 DIAGNOSIS — I50.42 CHRONIC COMBINED SYSTOLIC AND DIASTOLIC CONGESTIVE HEART FAILURE (HCC): ICD-10-CM

## 2023-07-11 NOTE — TELEPHONE ENCOUNTER
Requested Prescriptions     Pending Prescriptions Disp Refills    apixaban (ELIQUIS) 5 MG TABS tablet 60 tablet 3     Sig: take 1 tablet by mouth twice a day

## 2023-08-17 DIAGNOSIS — I50.42 CHRONIC COMBINED SYSTOLIC AND DIASTOLIC CONGESTIVE HEART FAILURE (HCC): ICD-10-CM

## 2023-08-20 DIAGNOSIS — I10 ESSENTIAL HYPERTENSION, BENIGN: ICD-10-CM

## 2023-08-21 RX ORDER — DOXAZOSIN 2 MG/1
2 TABLET ORAL NIGHTLY
Qty: 90 TABLET | Refills: 1 | Status: SHIPPED | OUTPATIENT
Start: 2023-08-21

## 2023-08-30 ENCOUNTER — OFFICE VISIT (OUTPATIENT)
Age: 73
End: 2023-08-30
Payer: MEDICARE

## 2023-08-30 VITALS
DIASTOLIC BLOOD PRESSURE: 64 MMHG | SYSTOLIC BLOOD PRESSURE: 176 MMHG | HEIGHT: 70 IN | HEART RATE: 51 BPM | BODY MASS INDEX: 21.05 KG/M2 | OXYGEN SATURATION: 100 % | WEIGHT: 147 LBS

## 2023-08-30 DIAGNOSIS — I50.42 CHRONIC COMBINED SYSTOLIC AND DIASTOLIC CONGESTIVE HEART FAILURE (HCC): Primary | ICD-10-CM

## 2023-08-30 DIAGNOSIS — Z95.810 PRESENCE OF AUTOMATIC (IMPLANTABLE) CARDIAC DEFIBRILLATOR: ICD-10-CM

## 2023-08-30 DIAGNOSIS — I25.118 ATHEROSCLEROSIS OF NATIVE CORONARY ARTERY OF NATIVE HEART WITH STABLE ANGINA PECTORIS (HCC): ICD-10-CM

## 2023-08-30 DIAGNOSIS — Z95.1 PRESENCE OF AORTOCORONARY BYPASS GRAFT: ICD-10-CM

## 2023-08-30 DIAGNOSIS — I48.0 PAROXYSMAL ATRIAL FIBRILLATION (HCC): ICD-10-CM

## 2023-08-30 DIAGNOSIS — I10 ESSENTIAL HYPERTENSION, BENIGN: ICD-10-CM

## 2023-08-30 PROCEDURE — 1123F ACP DISCUSS/DSCN MKR DOCD: CPT | Performed by: INTERNAL MEDICINE

## 2023-08-30 PROCEDURE — 3078F DIAST BP <80 MM HG: CPT | Performed by: INTERNAL MEDICINE

## 2023-08-30 PROCEDURE — 99214 OFFICE O/P EST MOD 30 MIN: CPT | Performed by: INTERNAL MEDICINE

## 2023-08-30 PROCEDURE — 3077F SYST BP >= 140 MM HG: CPT | Performed by: INTERNAL MEDICINE

## 2023-08-30 PROCEDURE — 93000 ELECTROCARDIOGRAM COMPLETE: CPT | Performed by: INTERNAL MEDICINE

## 2023-08-30 RX ORDER — DOXAZOSIN 8 MG/1
8 TABLET ORAL NIGHTLY
Qty: 90 TABLET | Refills: 3 | Status: SHIPPED | OUTPATIENT
Start: 2023-08-30

## 2023-08-30 RX ORDER — CLONIDINE HYDROCHLORIDE 0.1 MG/1
0.1 TABLET ORAL
Qty: 90 TABLET | Refills: 3 | Status: SHIPPED | OUTPATIENT
Start: 2023-08-30

## 2023-08-30 RX ORDER — CLONIDINE HYDROCHLORIDE 0.1 MG/1
0.1 TABLET ORAL
Qty: 90 TABLET | Refills: 3 | Status: SHIPPED | OUTPATIENT
Start: 2023-08-30 | End: 2023-08-30 | Stop reason: SDUPTHER

## 2023-08-30 ASSESSMENT — PATIENT HEALTH QUESTIONNAIRE - PHQ9
SUM OF ALL RESPONSES TO PHQ QUESTIONS 1-9: 0
SUM OF ALL RESPONSES TO PHQ QUESTIONS 1-9: 0
2. FEELING DOWN, DEPRESSED OR HOPELESS: 0
SUM OF ALL RESPONSES TO PHQ9 QUESTIONS 1 & 2: 0
1. LITTLE INTEREST OR PLEASURE IN DOING THINGS: 0
DEPRESSION UNABLE TO ASSESS: FUNCTIONAL CAPACITY MOTIVATION LIMITS ACCURACY
SUM OF ALL RESPONSES TO PHQ QUESTIONS 1-9: 0
SUM OF ALL RESPONSES TO PHQ QUESTIONS 1-9: 0

## 2023-08-30 ASSESSMENT — ENCOUNTER SYMPTOMS
EYES NEGATIVE: 1
RESPIRATORY NEGATIVE: 1
GASTROINTESTINAL NEGATIVE: 1

## 2023-08-30 NOTE — PROGRESS NOTES
1. Have you been to the ER, urgent care clinic since your last visit? Hospitalized since your last visit? No      2. Where do you normally have your labs drawn? PCP    3. Do you need any refills today? No    4. Which local pharmacy do you use (enter pharmacy)? [facility]    5. Which mail order pharmacy do you use (enter pharmacy)? [facility]     6. Are you here for surgical clearance and if so who will be doing your     procedure/surgery (care team)?    No

## 2023-08-30 NOTE — PROGRESS NOTES
Lan Llanos is a 74y.o. year old female. Follow-up of CAD, status post CABG, hypertension, paroxysmal A. fib, status post ICD, hyperlipidemia. 6/20 seen for abnormal stress test which was done in [facility] hospital in March 2024 preop testing for vascular surgery and was found to be abnormal.  She was recently seen 3 weeks ago when she did not mention anything about stress test being done in an outside hospital.    3/2021 patient presents to follow-up for hypertension and lab results  4/22 seen after cardioversion. Staying in sinus rhythm with demand atrial pacing. Feeling well without any new symptoms. 11/2022  Patient seen following admission to [facility] for acute on chronic systolic CHF. She was diuresed and ICD was interrogated with normal function, AFib episodes noted, elevated optivol. He medications were adjusted prior to d/c to home. Patient reports she has not smoked in 8 days. She denies chest pain, shortness of breath, palpitations or edema. 3/3/2023 in the last 3 months she has not had any significant chest pain, edema, dizziness or loss of consciousness. Occasional shortness of breath is noted when she is stressed but usually she is able to do her daily routine at home without any significant symptoms. 6/5/2023; 8/30/2023  No significant chest pain, shortness of breath, edema, dizziness, palpitations or loss of consciousness          Review of Systems   Constitutional: Negative. HENT: Negative. Eyes: Negative. Respiratory: Negative. Cardiovascular: Negative. Gastrointestinal: Negative. Endocrine: Negative. Genitourinary: Negative. Musculoskeletal: Negative. Neurological: Negative. Psychiatric/Behavioral: Negative. All other systems reviewed and are negative. Physical Exam  Vitals and nursing note reviewed. Constitutional:       Appearance: Normal appearance. HENT:      Head: Normocephalic and atraumatic.       Nose: Nose normal.   Eyes:

## 2023-08-31 DIAGNOSIS — I10 ESSENTIAL HYPERTENSION, BENIGN: ICD-10-CM

## 2023-09-01 ENCOUNTER — PROCEDURE VISIT (OUTPATIENT)
Age: 73
End: 2023-09-01

## 2023-09-01 DIAGNOSIS — Z95.810 PRESENCE OF AUTOMATIC (IMPLANTABLE) CARDIAC DEFIBRILLATOR: Primary | ICD-10-CM

## 2023-09-12 NOTE — PROGRESS NOTES
DDD ICD:   Normal function, rare AT/AF for a few minutes likely secondary to far field sensing, normal volume    Follow-up as scheduled

## 2023-10-06 RX ORDER — EZETIMIBE 10 MG/1
10 TABLET ORAL DAILY
Qty: 100 TABLET | Refills: 2 | Status: SHIPPED | OUTPATIENT
Start: 2023-10-06

## 2024-01-02 RX ORDER — AMLODIPINE BESYLATE 10 MG/1
10 TABLET ORAL DAILY
Qty: 100 TABLET | Refills: 2 | Status: SHIPPED | OUTPATIENT
Start: 2024-01-02

## 2024-02-16 LAB
A/G RATIO: 1 RATIO (ref 1.1–2.6)
ALBUMIN SERPL-MCNC: 4.2 G/DL (ref 3.5–5)
ALP BLD-CCNC: 73 U/L (ref 40–120)
ALT SERPL-CCNC: 18 U/L (ref 5–40)
ANION GAP SERPL CALCULATED.3IONS-SCNC: 12 MMOL/L (ref 3–15)
AST SERPL-CCNC: 28 U/L (ref 10–37)
BASOPHILS # BLD: 0 % (ref 0–2)
BASOPHILS ABSOLUTE: 0 K/UL (ref 0–0.2)
BILIRUB SERPL-MCNC: 0.5 MG/DL (ref 0.2–1.2)
BILIRUBIN DIRECT: <0.2 MG/DL (ref 0–0.3)
BUN BLDV-MCNC: 17 MG/DL (ref 6–22)
CALCIUM SERPL-MCNC: 9.6 MG/DL (ref 8.4–10.5)
CHLORIDE BLD-SCNC: 107 MMOL/L (ref 98–110)
CHOLESTEROL/HDL RATIO: 2.7 (ref 0–5)
CHOLESTEROL: 173 MG/DL (ref 110–200)
CO2: 24 MMOL/L (ref 20–32)
CREAT SERPL-MCNC: 1 MG/DL (ref 0.8–1.4)
EOSINOPHIL # BLD: 2 % (ref 0–6)
EOSINOPHILS ABSOLUTE: 0.1 K/UL (ref 0–0.5)
GLOBULIN: 4.2 G/DL (ref 2–4)
GLOMERULAR FILTRATION RATE: 55.7 ML/MIN/1.73 SQ.M.
GLUCOSE: 74 MG/DL (ref 70–99)
HCT VFR BLD CALC: 44.6 % (ref 35.1–48.3)
HDLC SERPL-MCNC: 63 MG/DL
HEMOGLOBIN: 14.5 G/DL (ref 11.7–16.1)
LDL CHOLESTEROL CALCULATED: 96 MG/DL (ref 50–99)
LDL/HDL RATIO: 1.5
LYMPHOCYTES # BLD: 35 % (ref 20–45)
LYMPHOCYTES ABSOLUTE: 1.5 K/UL (ref 1–4.8)
MCH RBC QN AUTO: 29 PG (ref 26–34)
MCHC RBC AUTO-ENTMCNC: 33 G/DL (ref 31–36)
MCV RBC AUTO: 89 FL (ref 80–99)
MONOCYTES ABSOLUTE: 0.6 K/UL (ref 0.1–1)
MONOCYTES: 13 % (ref 3–12)
NEUTROPHILS ABSOLUTE: 2.2 K/UL (ref 1.8–7.7)
NEUTROPHILS: 50 % (ref 40–75)
NON-HDL CHOLESTEROL: 110 MG/DL
PDW BLD-RTO: 14.7 % (ref 10–15.5)
PLATELET # BLD: 108 K/UL (ref 140–440)
PMV BLD AUTO: 12.2 FL (ref 9–13)
POTASSIUM SERPL-SCNC: 3.9 MMOL/L (ref 3.5–5.5)
RBC: 5.01 M/UL (ref 3.8–5.2)
SODIUM BLD-SCNC: 143 MMOL/L (ref 133–145)
TOTAL PROTEIN: 8.4 G/DL (ref 6.2–8.1)
TRIGL SERPL-MCNC: 69 MG/DL (ref 40–149)
VLDLC SERPL CALC-MCNC: 14 MG/DL (ref 8–30)
WBC: 4.5 K/UL (ref 4–11)

## 2024-04-01 RX ORDER — ROSUVASTATIN CALCIUM 40 MG/1
TABLET, COATED ORAL
Qty: 100 TABLET | Refills: 2 | Status: SHIPPED | OUTPATIENT
Start: 2024-04-01

## 2024-05-24 ENCOUNTER — OFFICE VISIT (OUTPATIENT)
Age: 74
End: 2024-05-24
Payer: MEDICARE

## 2024-05-24 VITALS
WEIGHT: 150 LBS | HEART RATE: 55 BPM | HEIGHT: 70 IN | BODY MASS INDEX: 21.47 KG/M2 | DIASTOLIC BLOOD PRESSURE: 58 MMHG | OXYGEN SATURATION: 100 % | SYSTOLIC BLOOD PRESSURE: 144 MMHG

## 2024-05-24 DIAGNOSIS — Z95.810 PRESENCE OF AUTOMATIC (IMPLANTABLE) CARDIAC DEFIBRILLATOR: ICD-10-CM

## 2024-05-24 DIAGNOSIS — E78.2 MIXED HYPERLIPIDEMIA: ICD-10-CM

## 2024-05-24 DIAGNOSIS — Z79.01 CURRENT USE OF LONG TERM ANTICOAGULATION: ICD-10-CM

## 2024-05-24 DIAGNOSIS — I50.42 CHRONIC COMBINED SYSTOLIC AND DIASTOLIC CONGESTIVE HEART FAILURE (HCC): Primary | ICD-10-CM

## 2024-05-24 DIAGNOSIS — Z95.1 PRESENCE OF AORTOCORONARY BYPASS GRAFT: ICD-10-CM

## 2024-05-24 DIAGNOSIS — I10 ESSENTIAL HYPERTENSION, BENIGN: ICD-10-CM

## 2024-05-24 DIAGNOSIS — I48.0 PAROXYSMAL ATRIAL FIBRILLATION (HCC): ICD-10-CM

## 2024-05-24 PROCEDURE — 99214 OFFICE O/P EST MOD 30 MIN: CPT | Performed by: INTERNAL MEDICINE

## 2024-05-24 PROCEDURE — 3077F SYST BP >= 140 MM HG: CPT | Performed by: INTERNAL MEDICINE

## 2024-05-24 PROCEDURE — 3078F DIAST BP <80 MM HG: CPT | Performed by: INTERNAL MEDICINE

## 2024-05-24 PROCEDURE — 1123F ACP DISCUSS/DSCN MKR DOCD: CPT | Performed by: INTERNAL MEDICINE

## 2024-05-24 RX ORDER — SPIRONOLACTONE 25 MG/1
25 TABLET ORAL DAILY
Qty: 30 TABLET | Refills: 5 | Status: SHIPPED | OUTPATIENT
Start: 2024-05-24

## 2024-05-24 ASSESSMENT — PATIENT HEALTH QUESTIONNAIRE - PHQ9
SUM OF ALL RESPONSES TO PHQ9 QUESTIONS 1 & 2: 0
DEPRESSION UNABLE TO ASSESS: FUNCTIONAL CAPACITY MOTIVATION LIMITS ACCURACY
1. LITTLE INTEREST OR PLEASURE IN DOING THINGS: NOT AT ALL
SUM OF ALL RESPONSES TO PHQ QUESTIONS 1-9: 0
SUM OF ALL RESPONSES TO PHQ QUESTIONS 1-9: 0
2. FEELING DOWN, DEPRESSED OR HOPELESS: NOT AT ALL
SUM OF ALL RESPONSES TO PHQ QUESTIONS 1-9: 0
SUM OF ALL RESPONSES TO PHQ QUESTIONS 1-9: 0

## 2024-05-24 ASSESSMENT — ENCOUNTER SYMPTOMS
GASTROINTESTINAL NEGATIVE: 1
EYES NEGATIVE: 1
RESPIRATORY NEGATIVE: 1

## 2024-05-24 NOTE — PROGRESS NOTES
Génesis Finley is a 73 y.o. year old female.    Follow-up of CAD, status post CABG, hypertension, paroxysmal A. fib, status post ICD, hyperlipidemia.  Quit smoking 2023 6/20 seen for abnormal stress test which was done in [facility] in March 2024 preop testing for vascular surgery and was found to be abnormal.  She was recently seen 3 weeks ago when she did not mention anything about stress test being done in an outside hospital.    3/2021 patient presents to follow-up for hypertension and lab results  4/22 seen after cardioversion.  Staying in sinus rhythm with demand atrial pacing.  Feeling well without any new symptoms.  11/2022  Patient seen following admission to [facility] for acute on chronic systolic CHF.  She was diuresed and ICD was interrogated with normal function, AFib episodes noted, elevated optivol. He medications were adjusted prior to d/c to home.  Patient reports she has not smoked in 8 days.  She denies chest pain, shortness of breath, palpitations or edema.  3/3/2023 in the last 3 months she has not had any significant chest pain, edema, dizziness or loss of consciousness.  Occasional shortness of breath is noted when she is stressed but usually she is able to do her daily routine at home without any significant symptoms.  6/5/2023; 8/30/2023; 5/24/2024  No significant chest pain, shortness of breath, edema, dizziness, palpitations or loss of consciousness            Review of Systems   Constitutional: Negative.    HENT: Negative.     Eyes: Negative.    Respiratory: Negative.     Cardiovascular: Negative.    Gastrointestinal: Negative.    Endocrine: Negative.    Genitourinary: Negative.    Musculoskeletal: Negative.    Neurological: Negative.    Psychiatric/Behavioral: Negative.     All other systems reviewed and are negative.        Physical Exam  Vitals and nursing note reviewed.   Constitutional:       Appearance: Normal appearance.   HENT:      Head: Normocephalic and atraumatic.      Nose:

## 2024-05-24 NOTE — PROGRESS NOTES
1. Have you been to the ER, urgent care clinic since your last visit?  Hospitalized since your last visit?     No      2.  Where do you normally have your labs drawn?   [facility]    3. Do you need any refills today?   No    4. Which local pharmacy do you use (enter pharmacy)?   [facility]    5. Which mail order pharmacy do you use (enter pharmacy)?   [facility]     6. Are you here for surgical clearance and if so who will be doing your     procedure/surgery (care team)?   No

## 2024-05-29 LAB
ANION GAP SERPL CALCULATED.3IONS-SCNC: 9 MMOL/L (ref 3–15)
BUN BLDV-MCNC: 26 MG/DL (ref 6–22)
CALCIUM SERPL-MCNC: 9.8 MG/DL (ref 8.4–10.5)
CHLORIDE BLD-SCNC: 107 MMOL/L (ref 98–110)
CO2: 28 MMOL/L (ref 20–32)
CREAT SERPL-MCNC: 1.2 MG/DL (ref 0.8–1.4)
GFR, ESTIMATED: 45.4 ML/MIN/1.73 SQ.M.
GLUCOSE: 85 MG/DL (ref 70–99)
POTASSIUM SERPL-SCNC: 4.5 MMOL/L (ref 3.5–5.5)
SODIUM BLD-SCNC: 144 MMOL/L (ref 133–145)

## 2024-06-07 ENCOUNTER — TELEPHONE (OUTPATIENT)
Age: 74
End: 2024-06-07

## 2024-06-07 NOTE — TELEPHONE ENCOUNTER
Patient was approved for Levqio by pharmacy but medical insurance for bill to pay want s patient to try Repatha or Praluent  before she can take Levqio. Please advise

## 2024-06-07 NOTE — TELEPHONE ENCOUNTER
Spoke to patient regarding taking injection for Praluent or Repatha if her insurance approve it. She will come in office and let me give injection. Please send RX to pharmacy so I can start Prior auth.

## 2024-06-10 RX ORDER — EZETIMIBE 10 MG/1
10 TABLET ORAL DAILY
Qty: 100 TABLET | Refills: 2 | Status: SHIPPED | OUTPATIENT
Start: 2024-06-10

## 2024-08-29 ENCOUNTER — TELEPHONE (OUTPATIENT)
Age: 74
End: 2024-08-29

## 2024-08-29 DIAGNOSIS — E78.2 MIXED HYPERLIPIDEMIA: ICD-10-CM

## 2024-08-29 DIAGNOSIS — I10 ESSENTIAL HYPERTENSION, BENIGN: Primary | ICD-10-CM

## 2024-09-03 LAB
A/G RATIO: 0.9 RATIO (ref 1.1–2.6)
ALBUMIN: 3.6 G/DL (ref 3.5–5)
ALP BLD-CCNC: 68 U/L (ref 40–120)
ALT SERPL-CCNC: 19 U/L (ref 5–40)
AST SERPL-CCNC: 38 U/L (ref 10–37)
BILIRUB SERPL-MCNC: 0.4 MG/DL (ref 0.2–1.2)
BILIRUBIN DIRECT: <0.2 MG/DL (ref 0–0.3)
CHOLESTEROL, TOTAL: 89 MG/DL (ref 110–200)
CHOLESTEROL/HDL RATIO: 1.8 (ref 0–5)
GLOBULIN: 4.2 G/DL (ref 2–4)
HDLC SERPL-MCNC: 49 MG/DL
LDL CHOLESTEROL: 29 MG/DL (ref 50–99)
LDL/HDL RATIO: 0.6
NON-HDL CHOLESTEROL: 40 MG/DL
TOTAL PROTEIN: 7.8 G/DL (ref 6.2–8.1)
TRIGL SERPL-MCNC: 52 MG/DL (ref 40–149)
VLDLC SERPL CALC-MCNC: 10 MG/DL (ref 8–30)

## 2024-09-14 DIAGNOSIS — I50.42 CHRONIC COMBINED SYSTOLIC AND DIASTOLIC CONGESTIVE HEART FAILURE (HCC): ICD-10-CM

## 2024-09-16 RX ORDER — SPIRONOLACTONE 25 MG/1
25 TABLET ORAL DAILY
Qty: 90 TABLET | Refills: 5 | Status: SHIPPED | OUTPATIENT
Start: 2024-09-16

## 2024-10-25 ENCOUNTER — OFFICE VISIT (OUTPATIENT)
Age: 74
End: 2024-10-25

## 2024-10-25 VITALS
BODY MASS INDEX: 20.04 KG/M2 | HEIGHT: 70 IN | SYSTOLIC BLOOD PRESSURE: 145 MMHG | HEART RATE: 58 BPM | DIASTOLIC BLOOD PRESSURE: 57 MMHG | WEIGHT: 140 LBS | OXYGEN SATURATION: 100 %

## 2024-10-25 DIAGNOSIS — I10 ESSENTIAL HYPERTENSION, BENIGN: ICD-10-CM

## 2024-10-25 DIAGNOSIS — I25.708 CORONARY ARTERY DISEASE OF BYPASS GRAFT OF NATIVE HEART WITH STABLE ANGINA PECTORIS (HCC): ICD-10-CM

## 2024-10-25 DIAGNOSIS — Z95.810 ICD (IMPLANTABLE CARDIOVERTER-DEFIBRILLATOR) IN PLACE: ICD-10-CM

## 2024-10-25 DIAGNOSIS — I50.42 CHRONIC COMBINED SYSTOLIC AND DIASTOLIC CONGESTIVE HEART FAILURE (HCC): ICD-10-CM

## 2024-10-25 DIAGNOSIS — E78.00 PURE HYPERCHOLESTEROLEMIA: ICD-10-CM

## 2024-10-25 DIAGNOSIS — I48.0 PAROXYSMAL ATRIAL FIBRILLATION (HCC): Primary | ICD-10-CM

## 2024-10-25 DIAGNOSIS — Z95.1 POSTSURGICAL AORTOCORONARY BYPASS STATUS: ICD-10-CM

## 2024-10-25 PROBLEM — I50.32 CHRONIC DIASTOLIC CONGESTIVE HEART FAILURE (HCC): Status: ACTIVE | Noted: 2021-06-21

## 2024-10-25 PROBLEM — I48.91 A-FIB (HCC): Status: RESOLVED | Noted: 2022-04-04 | Resolved: 2024-10-25

## 2024-10-25 ASSESSMENT — ENCOUNTER SYMPTOMS
RESPIRATORY NEGATIVE: 1
EYES NEGATIVE: 1
GASTROINTESTINAL NEGATIVE: 1

## 2024-10-25 ASSESSMENT — PATIENT HEALTH QUESTIONNAIRE - PHQ9
SUM OF ALL RESPONSES TO PHQ QUESTIONS 1-9: 0
2. FEELING DOWN, DEPRESSED OR HOPELESS: NOT AT ALL
SUM OF ALL RESPONSES TO PHQ QUESTIONS 1-9: 0
1. LITTLE INTEREST OR PLEASURE IN DOING THINGS: NOT AT ALL
SUM OF ALL RESPONSES TO PHQ QUESTIONS 1-9: 0
SUM OF ALL RESPONSES TO PHQ QUESTIONS 1-9: 0
SUM OF ALL RESPONSES TO PHQ9 QUESTIONS 1 & 2: 0

## 2024-10-25 NOTE — PROGRESS NOTES
Génesis Finley is a 73 y.o. year old female.    Follow-up of CAD, status post CABG, hypertension, paroxysmal A. fib, status post ICD, hyperlipidemia.  Quit smoking 2023 6/20 seen for abnormal stress test which was done in [facility] in March 2024 preop testing for vascular surgery and was found to be abnormal.  She was recently seen 3 weeks ago when she did not mention anything about stress test being done in an outside hospital.    3/2021 patient presents to follow-up for hypertension and lab results  4/22 seen after cardioversion.  Staying in sinus rhythm with demand atrial pacing.  Feeling well without any new symptoms.  11/2022  Patient seen following admission to [facility] for acute on chronic systolic CHF.  She was diuresed and ICD was interrogated with normal function, AFib episodes noted, elevated optivol. He medications were adjusted prior to d/c to home.  Patient reports she has not smoked in 8 days.  She denies chest pain, shortness of breath, palpitations or edema.  3/3/2023 in the last 3 months she has not had any significant chest pain, edema, dizziness or loss of consciousness.  Occasional shortness of breath is noted when she is stressed but usually she is able to do her daily routine at home without any significant symptoms.  6/5/2023; 8/30/2023; 5/24/2024; 10/25/2024  No significant chest pain, shortness of breath, edema, dizziness, palpitations or loss of consciousness            Review of Systems   Constitutional: Negative.    HENT: Negative.     Eyes: Negative.    Respiratory: Negative.     Cardiovascular: Negative.    Gastrointestinal: Negative.    Endocrine: Negative.    Genitourinary: Negative.    Musculoskeletal: Negative.    Neurological: Negative.    Psychiatric/Behavioral: Negative.     All other systems reviewed and are negative.        Physical Exam  Vitals and nursing note reviewed.   Constitutional:       Appearance: Normal appearance.   HENT:      Head: Normocephalic and atraumatic.

## 2024-10-25 NOTE — PROGRESS NOTES
1. Have you been to the ER, urgent care clinic since your last visit?  Hospitalized since your last visit?     no      2.  Where do you normally have your labs drawn?   [facility]    3. Do you need any refills today?   no    4. Which local pharmacy do you use (enter pharmacy)?   [facility] [address]    5. Which mail order pharmacy do you use (enter pharmacy)?   [facility]     6. Are you here for surgical clearance and if so who will be doing your     procedure/surgery (care team)?   no

## 2025-01-24 DIAGNOSIS — E78.00 PURE HYPERCHOLESTEROLEMIA: Primary | ICD-10-CM

## 2025-01-24 DIAGNOSIS — E78.2 MIXED HYPERLIPIDEMIA: ICD-10-CM

## 2025-04-08 LAB
ANION GAP SERPL CALCULATED.3IONS-SCNC: 9 MMOL/L (ref 3–15)
BUN BLDV-MCNC: 23 MG/DL (ref 6–22)
CALCIUM SERPL-MCNC: 9.6 MG/DL (ref 8.4–10.5)
CHLORIDE BLD-SCNC: 106 MMOL/L (ref 98–110)
CO2: 27 MMOL/L (ref 20–32)
CREAT SERPL-MCNC: 1.3 MG/DL (ref 0.8–1.4)
GFR, ESTIMATED: 41.2 ML/MIN/1.73 SQ.M.
GLUCOSE: 83 MG/DL (ref 70–99)
POTASSIUM SERPL-SCNC: 5.2 MMOL/L (ref 3.5–5.5)
SODIUM BLD-SCNC: 142 MMOL/L (ref 133–145)

## 2025-05-22 ENCOUNTER — OFFICE VISIT (OUTPATIENT)
Age: 75
End: 2025-05-22
Payer: MEDICARE

## 2025-05-22 VITALS
HEIGHT: 70 IN | WEIGHT: 138 LBS | HEART RATE: 55 BPM | BODY MASS INDEX: 19.76 KG/M2 | SYSTOLIC BLOOD PRESSURE: 156 MMHG | OXYGEN SATURATION: 99 % | DIASTOLIC BLOOD PRESSURE: 66 MMHG

## 2025-05-22 DIAGNOSIS — Z79.01 CURRENT USE OF LONG TERM ANTICOAGULATION: ICD-10-CM

## 2025-05-22 DIAGNOSIS — I25.708 CORONARY ARTERY DISEASE OF BYPASS GRAFT OF NATIVE HEART WITH STABLE ANGINA PECTORIS: ICD-10-CM

## 2025-05-22 DIAGNOSIS — I10 ESSENTIAL HYPERTENSION, BENIGN: ICD-10-CM

## 2025-05-22 DIAGNOSIS — Z95.810 ICD (IMPLANTABLE CARDIOVERTER-DEFIBRILLATOR) IN PLACE: ICD-10-CM

## 2025-05-22 DIAGNOSIS — I48.0 PAROXYSMAL ATRIAL FIBRILLATION (HCC): ICD-10-CM

## 2025-05-22 DIAGNOSIS — E78.2 MIXED HYPERLIPIDEMIA: ICD-10-CM

## 2025-05-22 DIAGNOSIS — I50.42 CHRONIC COMBINED SYSTOLIC AND DIASTOLIC CONGESTIVE HEART FAILURE (HCC): Primary | ICD-10-CM

## 2025-05-22 DIAGNOSIS — Z95.1 POSTSURGICAL AORTOCORONARY BYPASS STATUS: ICD-10-CM

## 2025-05-22 PROBLEM — I50.32 CHRONIC DIASTOLIC CONGESTIVE HEART FAILURE (HCC): Status: RESOLVED | Noted: 2021-06-21 | Resolved: 2025-05-22

## 2025-05-22 PROCEDURE — 1126F AMNT PAIN NOTED NONE PRSNT: CPT | Performed by: INTERNAL MEDICINE

## 2025-05-22 PROCEDURE — 1160F RVW MEDS BY RX/DR IN RCRD: CPT | Performed by: INTERNAL MEDICINE

## 2025-05-22 PROCEDURE — 99214 OFFICE O/P EST MOD 30 MIN: CPT | Performed by: INTERNAL MEDICINE

## 2025-05-22 PROCEDURE — 1123F ACP DISCUSS/DSCN MKR DOCD: CPT | Performed by: INTERNAL MEDICINE

## 2025-05-22 PROCEDURE — 3078F DIAST BP <80 MM HG: CPT | Performed by: INTERNAL MEDICINE

## 2025-05-22 PROCEDURE — 3077F SYST BP >= 140 MM HG: CPT | Performed by: INTERNAL MEDICINE

## 2025-05-22 PROCEDURE — 1159F MED LIST DOCD IN RCRD: CPT | Performed by: INTERNAL MEDICINE

## 2025-05-22 RX ORDER — CLONIDINE HYDROCHLORIDE 0.2 MG/1
0.2 TABLET ORAL 2 TIMES DAILY
Qty: 180 TABLET | Refills: 1 | Status: SHIPPED | OUTPATIENT
Start: 2025-05-22

## 2025-05-22 ASSESSMENT — ENCOUNTER SYMPTOMS
EYES NEGATIVE: 1
RESPIRATORY NEGATIVE: 1
GASTROINTESTINAL NEGATIVE: 1

## 2025-05-22 ASSESSMENT — PATIENT HEALTH QUESTIONNAIRE - PHQ9
1. LITTLE INTEREST OR PLEASURE IN DOING THINGS: NOT AT ALL
SUM OF ALL RESPONSES TO PHQ QUESTIONS 1-9: 0
SUM OF ALL RESPONSES TO PHQ QUESTIONS 1-9: 0
2. FEELING DOWN, DEPRESSED OR HOPELESS: NOT AT ALL
SUM OF ALL RESPONSES TO PHQ QUESTIONS 1-9: 0
SUM OF ALL RESPONSES TO PHQ QUESTIONS 1-9: 0

## 2025-05-22 NOTE — PROGRESS NOTES
Génesis Finley is a 74 y.o. year old female.    Follow-up of CAD, status post CABG, hypertension, paroxysmal A. fib, status post ICD, hyperlipidemia.  Quit smoking 2023 6/20 seen for abnormal stress test which was done in [facility] in March 2024 preop testing for vascular surgery and was found to be abnormal.  She was recently seen 3 weeks ago when she did not mention anything about stress test being done in an outside hospital.    3/2021 patient presents to follow-up for hypertension and lab results  4/22 seen after cardioversion.  Staying in sinus rhythm with demand atrial pacing.  Feeling well without any new symptoms.  11/2022  Patient seen following admission to [facility] for acute on chronic systolic CHF.  She was diuresed and ICD was interrogated with normal function, AFib episodes noted, elevated optivol. He medications were adjusted prior to d/c to home.  Patient reports she has not smoked in 8 days.  She denies chest pain, shortness of breath, palpitations or edema.  3/3/2023 in the last 3 months she has not had any significant chest pain, edema, dizziness or loss of consciousness.  Occasional shortness of breath is noted when she is stressed but usually she is able to do her daily routine at home without any significant symptoms.  6/5/2023; 8/30/2023; 5/24/2024; 10/25/2024; 5/22/2025 no significant chest pain, shortness of breath, edema, dizziness, palpitations or loss of consciousness              Review of Systems   Constitutional: Negative.    HENT: Negative.     Eyes: Negative.    Respiratory: Negative.     Cardiovascular: Negative.    Gastrointestinal: Negative.    Endocrine: Negative.    Genitourinary: Negative.    Musculoskeletal: Negative.    Neurological: Negative.    Psychiatric/Behavioral: Negative.     All other systems reviewed and are negative.        Physical Exam  Vitals and nursing note reviewed.   Constitutional:       Appearance: Normal appearance.   HENT:      Head: Normocephalic and

## 2025-05-23 ENCOUNTER — OFFICE VISIT (OUTPATIENT)
Age: 75
End: 2025-05-23

## 2025-05-23 DIAGNOSIS — Z95.810 ICD (IMPLANTABLE CARDIOVERTER-DEFIBRILLATOR) IN PLACE: Primary | ICD-10-CM

## 2025-06-05 DIAGNOSIS — E78.00 PURE HYPERCHOLESTEROLEMIA: ICD-10-CM

## 2025-06-05 DIAGNOSIS — E78.2 MIXED HYPERLIPIDEMIA: ICD-10-CM

## 2025-07-30 DIAGNOSIS — I10 ESSENTIAL HYPERTENSION, BENIGN: ICD-10-CM

## 2025-07-30 RX ORDER — CLONIDINE HYDROCHLORIDE 0.2 MG/1
0.2 TABLET ORAL 2 TIMES DAILY
Qty: 200 TABLET | Refills: 2 | Status: SHIPPED | OUTPATIENT
Start: 2025-07-30

## 2025-09-02 DIAGNOSIS — I50.42 CHRONIC COMBINED SYSTOLIC AND DIASTOLIC CONGESTIVE HEART FAILURE (HCC): ICD-10-CM

## 2025-09-02 RX ORDER — SPIRONOLACTONE 25 MG/1
25 TABLET ORAL DAILY
Qty: 90 TABLET | Refills: 3 | Status: SHIPPED | OUTPATIENT
Start: 2025-09-02

## (undated) DEVICE — CATHETER ANGIO 4FR L100CM COR NYL AR MOD W/O SIDE H RADPQ

## (undated) DEVICE — INTRODUCER SHTH 4FR CANN L11CM DIL TIP 25MM RED TUNGSTEN

## (undated) DEVICE — CATHETER DIAG AD 4FR L100CM STD NYL JUDKINS R 4 TRULUMEN

## (undated) DEVICE — CATHETER ANGIO 4FR L100CM S STL NYL JL4 3 SEG BRAID SFT

## (undated) DEVICE — COVER US PRB W15XL120CM W/ GEL RUBBERBAND TAPE STRP FLD GEN

## (undated) DEVICE — SET FLD ADMIN 3 W STPCOCK FIX FEM L BOR 1IN

## (undated) DEVICE — PROCEDURE KIT FLUID MGMT 10 FR CUST MAINFOLD

## (undated) DEVICE — GUIDEWIRE VASC L260CM DIA0035IN TIP L3MM PTFE J STD TAPR FIX

## (undated) DEVICE — PRESSURE MONITORING SET: Brand: TRUWAVE

## (undated) DEVICE — CATHETER ANGIO 4FR L100CM S STL NYL IM 3 SEG BRAID SFT

## (undated) DEVICE — CATHETER ANGIO 4FR L100CM S STL NYL MPA 2 W/ 2 SIDE H 3 SEG

## (undated) DEVICE — PACK PROCEDURE SURG VASC CATH 161 MMC LF